# Patient Record
Sex: FEMALE | Race: BLACK OR AFRICAN AMERICAN | NOT HISPANIC OR LATINO | Employment: FULL TIME | URBAN - METROPOLITAN AREA
[De-identification: names, ages, dates, MRNs, and addresses within clinical notes are randomized per-mention and may not be internally consistent; named-entity substitution may affect disease eponyms.]

---

## 2017-04-27 ENCOUNTER — OFFICE VISIT (OUTPATIENT)
Dept: INTERNAL MEDICINE | Facility: CLINIC | Age: 27
End: 2017-04-27
Payer: COMMERCIAL

## 2017-04-27 VITALS
HEIGHT: 63 IN | DIASTOLIC BLOOD PRESSURE: 82 MMHG | SYSTOLIC BLOOD PRESSURE: 150 MMHG | BODY MASS INDEX: 34.53 KG/M2 | TEMPERATURE: 98 F | HEART RATE: 80 BPM | WEIGHT: 194.88 LBS

## 2017-04-27 DIAGNOSIS — J30.1 SEASONAL ALLERGIC RHINITIS DUE TO POLLEN: ICD-10-CM

## 2017-04-27 DIAGNOSIS — J45.20 MILD INTERMITTENT ASTHMA WITHOUT COMPLICATION: ICD-10-CM

## 2017-04-27 DIAGNOSIS — I10 ESSENTIAL HYPERTENSION: Primary | ICD-10-CM

## 2017-04-27 PROCEDURE — 99214 OFFICE O/P EST MOD 30 MIN: CPT | Mod: S$GLB,,, | Performed by: PHYSICIAN ASSISTANT

## 2017-04-27 PROCEDURE — 3079F DIAST BP 80-89 MM HG: CPT | Mod: S$GLB,,, | Performed by: PHYSICIAN ASSISTANT

## 2017-04-27 PROCEDURE — 3077F SYST BP >= 140 MM HG: CPT | Mod: S$GLB,,, | Performed by: PHYSICIAN ASSISTANT

## 2017-04-27 PROCEDURE — 1160F RVW MEDS BY RX/DR IN RCRD: CPT | Mod: S$GLB,,, | Performed by: PHYSICIAN ASSISTANT

## 2017-04-27 PROCEDURE — 99999 PR PBB SHADOW E&M-EST. PATIENT-LVL III: CPT | Mod: PBBFAC,,, | Performed by: PHYSICIAN ASSISTANT

## 2017-04-27 RX ORDER — HYDROCHLOROTHIAZIDE 25 MG/1
25 TABLET ORAL DAILY
Qty: 30 TABLET | Refills: 3 | Status: SHIPPED | OUTPATIENT
Start: 2017-04-27 | End: 2020-05-11

## 2017-04-27 RX ORDER — ALBUTEROL SULFATE 90 UG/1
2 AEROSOL, METERED RESPIRATORY (INHALATION) EVERY 6 HOURS PRN
Qty: 18 G | Refills: 2 | Status: SHIPPED | OUTPATIENT
Start: 2017-04-27 | End: 2018-08-22

## 2017-04-27 RX ORDER — MONTELUKAST SODIUM 10 MG/1
10 TABLET ORAL NIGHTLY
Qty: 30 TABLET | Refills: 6 | Status: SHIPPED | OUTPATIENT
Start: 2017-04-27 | End: 2017-05-27

## 2017-04-27 NOTE — PROGRESS NOTES
Subjective:       Patient ID: Smith Love is a 26 y.o. female.    Chief Complaint: Hypertension (history of hpb, tingling in finger tips yesterday)    Hypertension   This is a chronic problem. The current episode started more than 1 year ago. The problem has been gradually worsening since onset. The problem is uncontrolled. Associated symptoms include anxiety. Pertinent negatives include no blurred vision, chest pain, headaches, malaise/fatigue, neck pain, palpitations, peripheral edema, shortness of breath or sweats. (Reports tingling in the left fingers for one day only. Symptoms have resolved.  ) There are no associated agents to hypertension. Risk factors for coronary artery disease include family history and stress. Past treatments include diuretics (Has been out of her medication for several weeks). Compliance problems include psychosocial issues and diet.    URI    This is a new problem. The current episode started in the past 7 days. The problem has been unchanged. There has been no fever. Associated symptoms include congestion, a plugged ear sensation, rhinorrhea, sneezing and a sore throat. Pertinent negatives include no chest pain, coughing, headaches or neck pain. She has tried nothing for the symptoms.     Past Medical History:   Diagnosis Date    Asthma     Essential hypertension 4/27/2017    Staph aureus infection        No past surgical history on file.    Family History   Problem Relation Age of Onset    Diabetes Maternal Grandmother     Hypertension Maternal Grandmother        Social History     Social History    Marital status: Single     Spouse name: N/A    Number of children: N/A    Years of education: N/A     Occupational History    Not on file.     Social History Main Topics    Smoking status: Never Smoker    Smokeless tobacco: Never Used    Alcohol use No    Drug use: No    Sexual activity: Not on file     Other Topics Concern    Not on file     Social History  "Narrative       Review of patient's allergies indicates:  No Known Allergies      Current Outpatient Prescriptions:     albuterol 90 mcg/actuation inhaler, Inhale 2 puffs into the lungs every 6 (six) hours as needed for Wheezing., Disp: 18 g, Rfl: 2    ARM SLING-ONE SIZE FITS ALL Misc, 1 Device by Misc.(Non-Drug; Combo Route) route daily as needed., Disp: 1 each, Rfl: 0    hydrochlorothiazide (HYDRODIURIL) 25 MG tablet, Take 1 tablet (25 mg total) by mouth once daily., Disp: 30 tablet, Rfl: 3    montelukast (SINGULAIR) 10 mg tablet, Take 1 tablet (10 mg total) by mouth every evening., Disp: 30 tablet, Rfl: 6    BP (!) 150/82 (BP Location: Right arm, Patient Position: Sitting, BP Method: Manual)  Pulse 80  Temp 98 °F (36.7 °C) (Tympanic)   Ht 5' 3" (1.6 m)  Wt 88.4 kg (194 lb 14.2 oz)  LMP 04/17/2017  BMI 34.52 kg/m2  Review of Systems   Constitutional: Negative for chills, fatigue, fever and malaise/fatigue.   HENT: Positive for congestion, rhinorrhea, sneezing and sore throat. Negative for ear discharge, nosebleeds, postnasal drip and sinus pressure.    Eyes: Negative for blurred vision.   Respiratory: Negative for cough, chest tightness and shortness of breath.    Cardiovascular: Negative for chest pain and palpitations.   Musculoskeletal: Negative for neck pain.   Neurological: Negative for headaches.       Objective:      Physical Exam   Constitutional: She appears well-developed and well-nourished. No distress.   HENT:   Head: Normocephalic and atraumatic.   Right Ear: Tympanic membrane and ear canal normal.   Left Ear: Tympanic membrane and ear canal normal.   Nose: Mucosal edema and rhinorrhea present.   Mouth/Throat: Oropharynx is clear and moist. Tonsils are 1+ on the right. Tonsils are 1+ on the left. No tonsillar exudate.   Cardiovascular: Normal rate and regular rhythm.  Exam reveals no gallop and no friction rub.    No murmur heard.  Pulmonary/Chest: Effort normal and breath sounds normal. No " respiratory distress. She has no wheezes. She has no rales. She exhibits no tenderness.   Abdominal: Soft. She exhibits no distension and no mass. There is no tenderness. There is no rebound and no guarding. No hernia.   Musculoskeletal: Normal range of motion. She exhibits no edema.   Lymphadenopathy:     She has cervical adenopathy.   Skin: She is not diaphoretic.   Nursing note and vitals reviewed.      Assessment:       1. Essential hypertension    2. Mild intermittent asthma without complication      3 seasonal allergies  Plan:       Essential hypertension  -     Comprehensive metabolic panel; Future; Expected date: 4/27/17  -     CBC auto differential; Future; Expected date: 4/27/17  -     TSH; Future; Expected date: 4/27/17  -     Lipid panel; Future; Expected date: 4/27/17    Mild intermittent asthma without complication    Seasonal allergic rhinitis due to pollen    Other orders  -     hydrochlorothiazide (HYDRODIURIL) 25 MG tablet; Take 1 tablet (25 mg total) by mouth once daily.  Dispense: 30 tablet; Refill: 3  -     albuterol 90 mcg/actuation inhaler; Inhale 2 puffs into the lungs every 6 (six) hours as needed for Wheezing.  Dispense: 18 g; Refill: 2  -     montelukast (SINGULAIR) 10 mg tablet; Take 1 tablet (10 mg total) by mouth every evening.  Dispense: 30 tablet; Refill: 6

## 2017-04-27 NOTE — PATIENT INSTRUCTIONS
Controlling High Blood Pressure  High blood pressure (hypertension) is often called the silent killer. This is because many people who have it dont know it. High blood pressure is defined as 140/90 mm Hg or higher. Know your blood pressure and remember to check it regularly. Doing so can save your life. Here are some things you can do to help control your blood pressure.    Choose heart-healthy foods  · Select low-salt, low-fat foods. Limit sodium intake to 2,400 mg per day or the amount suggested by your healthcare provider.  · Limit canned, dried, cured, packaged, and fast foods. These can contain a lot of salt.  · Eat 8 to 10 servings of fruits and vegetables every day.  · Choose lean meats, fish, or chicken.  · Eat whole-grain pasta, brown rice, and beans.  · Eat 2 to 3 servings of low-fat or fat-free dairy products.  · Ask your doctor about the DASH eating plan. This plan helps reduce blood pressure.  · When you go to a restaurant, ask that your meal be prepared with no added salt.  Maintain a healthy weight  · Ask your healthcare provider how many calories to eat a day. Then stick to that number.  · Ask your healthcare provider what weight range is healthiest for you. If you are overweight, a weight loss of only 3% to 5% of your body weight can help lower blood pressure. Generally, a good weight loss goal is to lose 10% of your body weight in a year.  · Limit snacks and sweets.  · Get regular exercise.  Get up and get active  · Choose activities you enjoy. Find ones you can do with friends or family. This includes bicycling, dancing, walking, and jogging.  · Park farther away from building entrances.  · Use stairs instead of the elevator.  · When you can, walk or bike instead of driving.  · [place] leaves, garden, or do household repairs.  · Be active at a moderate to vigorous level of physical activity for at least 40 minutes for a minimum of 3 to 4 days a week.   Manage stress  · Make time to relax and  enjoy life. Find time to laugh.  · Communicate your concerns with your loved ones and your healthcare provider.  · Visit with family and friends, and keep up with hobbies.  Limit alcohol and quit smoking  · Men should have no more than 2 drinks per day.  · Women should have no more than 1 drink per day.  · Talk with your healthcare provider about quitting smoking. Smoking significantly increases your risk for heart disease and stroke. Ask your healthcare provider about community smoking cessation programs and other options.  Medicines  If lifestyle changes arent enough, your healthcare provider may prescribe high blood pressure medicine. Take all medicines as prescribed. If you have any questions about your medicines, ask your healthcare provider before stopping or changing them.   © 8213-7342 [employer]. [address] [place] [address], [place] [address]. All rights reserved. This information is not intended as a substitute for professional medical care. Always follow your healthcare professional's instructions.          Long-Term Complications of Diabetes  Diabetes can cause health problems over time. These are called complications. They are more likely to occur if your blood sugar is often too high. Over time, high blood sugar can damage blood vessels in your body. It is important to keep your blood sugar in your target range. This can help prevent or delay complications from diabetes.    Possible complications  Complications of diabetes include:  · Eye problems, including damage to the blood vessels in the eyes (retinopathy), pressure in the eye (glaucoma), and clouding of the eyes lens (a cataract). Eye problems can eventually lead to irreversible blindness.   · Tooth and gum problems (periodontal disease), causing loss of teeth and bone  · Blood vessel (vascular) disease leading to circulation problems, heart attack or stroke, or a need for amputation of a limb   · Problems with sexual function leading  to erectile dysfunction in men and sexual discomfort in women   · Kidney disease (nephropathy) can eventually lead to kidney failure, which may require dialysis or kidney transplant   · Nerve problems (neuropathy), causing pain or loss of feeling in your feet and other parts of your body, potentially leading to an amputation of a limb   · High blood pressure (hypertension), putting strain on your heart and blood vessels  · Serious infections, possibly leading to loss of toes, feet, or limbs  How to avoid complications  The serious consequences of these complications are completely avoidable for most people with diabetes by managing your blood glucose, blood pressure, and cholesterol levels. This can help you feel better and stay healthy. You can manage diabetes by tracking your blood sugar. You can also eat healthy and exercise. And you should take medication if directed by your health care provider.  © 7289-5395 [employer]. [address] [place] [address], [place] [address]. All rights reserved. This information is not intended as a substitute for professional medical care. Always follow your healthcare professional's instructions.      Allergies: Continue with new medicines until gone. May take tylenol PRN fever. Increase fluids and rest. Call the clinic if not better in 3 to 5 days. Suggest togo to the Emergency Room if symptoms get much worse. Otherwise follow up with your PCP as scheduled.

## 2017-04-27 NOTE — MR AVS SNAPSHOT
[facility]  [address]  [place] [address]  Phone: [phone]  Fax: [phone]                  Smith Love   2017 1:00 PM   Office Visit    Description:  Female : 1990   Provider:  Keenan Gruber III, PA-C   Department:  [facility]           Reason for Visit     Hypertension           Diagnoses this Visit        Comments    Essential hypertension    -  Primary     Mild intermittent asthma without complication         Seasonal allergic rhinitis due to pollen                To Do List           Future Appointments        Provider Department Dept Phone    2017 7:40 AM Keenan Gruber III, PA-C [facility] [phone]    2017 9:10 AM LABORATORY, [address] [facility] [phone]      Goals (5 Years of Data)     None      Follow-Up and Disposition     Return in about 4 weeks (around 2017).       These Medications        Disp Refills Start End    hydrochlorothiazide (HYDRODIURIL) 25 MG tablet 30 tablet 3 2017    Take 1 tablet (25 mg total) by mouth once daily. - Oral    Pharmacy: [address] [address] [facility] Ph #: [phone]       albuterol 90 mcg/actuation inhaler 18 g 2 2017    Inhale 2 puffs into the lungs every 6 (six) hours as needed for Wheezing. - Inhalation    Pharmacy: [address] [address] [facility] Ph #: [phone]       montelukast (SINGULAIR) 10 mg tablet 30 tablet 6 2017    Take 1 tablet (10 mg total) by mouth every evening. - Oral    Pharmacy: [address] [place] - [address] [facility] Ph #: [phone]         [facility]     [facility] Nurse Care Line -  Assistance  Unless otherwise directed by your provider, please contact [facility], our nurse care line that is available for  "24/7 assistance.     Registered nurses in the [facility] provide: appointment scheduling, clinical advisement, health education, and other advisory services.  Call: [phone] (toll free)               Medications           START taking these NEW medications        Refills    montelukast (SINGULAIR) 10 mg tablet 6    Sig: Take 1 tablet (10 mg total) by mouth every evening.    Class: Normal    Route: Oral      STOP taking these medications     nabumetone (RELAFEN) 500 MG tablet Take 1 tablet (500 mg total) by mouth 2 (two) times daily.           Verify that the below list of medications is an accurate representation of the medications you are currently taking.  If none reported, the list may be blank. If incorrect, please contact your healthcare provider. Carry this list with you in case of emergency.           Current Medications     albuterol 90 mcg/actuation inhaler Inhale 2 puffs into the lungs every 6 (six) hours as needed for Wheezing.    ARM SLING-ONE SIZE FITS ALL Misc 1 Device by Misc.(Non-Drug; Combo Route) route daily as needed.    hydrochlorothiazide (HYDRODIURIL) 25 MG tablet Take 1 tablet (25 mg total) by mouth once daily.    montelukast (SINGULAIR) 10 mg tablet Take 1 tablet (10 mg total) by mouth every evening.           Clinical Reference Information           Your Vitals Were     BP Pulse Temp Height    150/82 (BP Location: Right arm, Patient Position: Sitting, BP Method: Manual) 80 98 °F (36.7 °C) (Tympanic) 5' 3" (1.6 m)    Weight Last Period BMI    88.4 kg (194 lb 14.2 oz) 04/17/2017 34.52 kg/m2      Blood Pressure          Most Recent Value    BP  (!)  150/82      Allergies as of 4/27/2017     No Known Allergies      Immunizations Administered on Date of Encounter - 4/27/2017     None      Orders Placed During Today's Visit     Future Labs/Procedures Expected by Expires    CBC auto differential  4/27/2017 6/26/2018    Comprehensive metabolic panel  4/27/2017 6/26/2018    Lipid " panel  4/27/2017 6/26/2018    TSH  4/27/2017 6/26/2018      Instructions        Controlling High Blood Pressure  High blood pressure (hypertension) is often called the silent killer. This is because many people who have it dont know it. High blood pressure is defined as 140/90 mm Hg or higher. Know your blood pressure and remember to check it regularly. Doing so can save your life. Here are some things you can do to help control your blood pressure.    Choose heart-healthy foods  · Select low-salt, low-fat foods. Limit sodium intake to 2,400 mg per day or the amount suggested by your healthcare provider.  · Limit canned, dried, cured, packaged, and fast foods. These can contain a lot of salt.  · Eat 8 to 10 servings of fruits and vegetables every day.  · Choose lean meats, fish, or chicken.  · Eat whole-grain pasta, brown rice, and beans.  · Eat 2 to 3 servings of low-fat or fat-free dairy products.  · Ask your doctor about the DASH eating plan. This plan helps reduce blood pressure.  · When you go to a restaurant, ask that your meal be prepared with no added salt.  Maintain a healthy weight  · Ask your healthcare provider how many calories to eat a day. Then stick to that number.  · Ask your healthcare provider what weight range is healthiest for you. If you are overweight, a weight loss of only 3% to 5% of your body weight can help lower blood pressure. Generally, a good weight loss goal is to lose 10% of your body weight in a year.  · Limit snacks and sweets.  · Get regular exercise.  Get up and get active  · Choose activities you enjoy. Find ones you can do with friends or family. This includes bicycling, dancing, walking, and jogging.  · Park farther away from building entrances.  · Use stairs instead of the elevator.  · When you can, walk or bike instead of driving.  · [place] leaves, garden, or do household repairs.  · Be active at a moderate to vigorous level of physical activity for at least 40 minutes for a  minimum of 3 to 4 days a week.   Manage stress  · Make time to relax and enjoy life. Find time to laugh.  · Communicate your concerns with your loved ones and your healthcare provider.  · Visit with family and friends, and keep up with hobbies.  Limit alcohol and quit smoking  · Men should have no more than 2 drinks per day.  · Women should have no more than 1 drink per day.  · Talk with your healthcare provider about quitting smoking. Smoking significantly increases your risk for heart disease and stroke. Ask your healthcare provider about community smoking cessation programs and other options.  Medicines  If lifestyle changes arent enough, your healthcare provider may prescribe high blood pressure medicine. Take all medicines as prescribed. If you have any questions about your medicines, ask your healthcare provider before stopping or changing them.   © 2294-9294 [employer]. [address] [place] [address], [place] [address]. All rights reserved. This information is not intended as a substitute for professional medical care. Always follow your healthcare professional's instructions.          Long-Term Complications of Diabetes  Diabetes can cause health problems over time. These are called complications. They are more likely to occur if your blood sugar is often too high. Over time, high blood sugar can damage blood vessels in your body. It is important to keep your blood sugar in your target range. This can help prevent or delay complications from diabetes.    Possible complications  Complications of diabetes include:  · Eye problems, including damage to the blood vessels in the eyes (retinopathy), pressure in the eye (glaucoma), and clouding of the eyes lens (a cataract). Eye problems can eventually lead to irreversible blindness.   · Tooth and gum problems (periodontal disease), causing loss of teeth and bone  · Blood vessel (vascular) disease leading to circulation problems, heart attack or stroke, or  a need for amputation of a limb   · Problems with sexual function leading to erectile dysfunction in men and sexual discomfort in women   · Kidney disease (nephropathy) can eventually lead to kidney failure, which may require dialysis or kidney transplant   · Nerve problems (neuropathy), causing pain or loss of feeling in your feet and other parts of your body, potentially leading to an amputation of a limb   · High blood pressure (hypertension), putting strain on your heart and blood vessels  · Serious infections, possibly leading to loss of toes, feet, or limbs  How to avoid complications  The serious consequences of these complications are completely avoidable for most people with diabetes by managing your blood glucose, blood pressure, and cholesterol levels. This can help you feel better and stay healthy. You can manage diabetes by tracking your blood sugar. You can also eat healthy and exercise. And you should take medication if directed by your health care provider.  © 0239-5407 [employer]. [address] [place] [address]. All rights reserved. This information is not intended as a substitute for professional medical care. Always follow your healthcare professional's instructions.      Allergies: Continue with new medicines until gone. May take tylenol PRN fever. Increase fluids and rest. Call the clinic if not better in 3 to 5 days. Suggest togo to the Emergency Room if symptoms get much worse. Otherwise follow up with your PCP as scheduled.        Language Assistance Services     ATTENTION: Language assistance services are available, free of charge. Please call [phone].      ATENCIÓN: Si habla mariela, tiene a prescott disposición servicios gratuitos de asistencia lingüística. Llame al [phone].     [facility] Ý: N?u b?n nói Ti?ng Vi?t, có các d?ch v? h? tr? ngôn ng? mi?n phí dành cho b?n. G?i s? [phone].         [facility] complies with applicable Federal  civil rights laws and does not discriminate on the basis of race, color, national origin, age, disability, or sex.

## 2018-08-22 ENCOUNTER — OFFICE VISIT (OUTPATIENT)
Dept: INTERNAL MEDICINE | Facility: CLINIC | Age: 28
End: 2018-08-22
Payer: COMMERCIAL

## 2018-08-22 ENCOUNTER — TELEPHONE (OUTPATIENT)
Dept: INTERNAL MEDICINE | Facility: CLINIC | Age: 28
End: 2018-08-22

## 2018-08-22 VITALS
DIASTOLIC BLOOD PRESSURE: 88 MMHG | WEIGHT: 209 LBS | TEMPERATURE: 98 F | HEART RATE: 57 BPM | OXYGEN SATURATION: 99 % | BODY MASS INDEX: 37.03 KG/M2 | SYSTOLIC BLOOD PRESSURE: 138 MMHG | HEIGHT: 63 IN

## 2018-08-22 DIAGNOSIS — J45.20 MILD INTERMITTENT ASTHMA WITHOUT COMPLICATION: Primary | ICD-10-CM

## 2018-08-22 PROCEDURE — 99214 OFFICE O/P EST MOD 30 MIN: CPT | Mod: S$GLB,,, | Performed by: PHYSICIAN ASSISTANT

## 2018-08-22 PROCEDURE — 99999 PR PBB SHADOW E&M-EST. PATIENT-LVL III: CPT | Mod: PBBFAC,,, | Performed by: PHYSICIAN ASSISTANT

## 2018-08-22 PROCEDURE — 3075F SYST BP GE 130 - 139MM HG: CPT | Mod: CPTII,S$GLB,, | Performed by: PHYSICIAN ASSISTANT

## 2018-08-22 PROCEDURE — 3079F DIAST BP 80-89 MM HG: CPT | Mod: CPTII,S$GLB,, | Performed by: PHYSICIAN ASSISTANT

## 2018-08-22 PROCEDURE — 3008F BODY MASS INDEX DOCD: CPT | Mod: CPTII,S$GLB,, | Performed by: PHYSICIAN ASSISTANT

## 2018-08-22 RX ORDER — ALBUTEROL SULFATE 90 UG/1
2 AEROSOL, METERED RESPIRATORY (INHALATION) EVERY 6 HOURS PRN
Qty: 18 G | Refills: 11 | Status: SHIPPED | OUTPATIENT
Start: 2018-08-22 | End: 2019-08-22

## 2018-08-22 RX ORDER — IPRATROPIUM BROMIDE AND ALBUTEROL SULFATE 2.5; .5 MG/3ML; MG/3ML
3 SOLUTION RESPIRATORY (INHALATION) EVERY 6 HOURS PRN
Qty: 1 BOX | Refills: 5 | Status: SHIPPED | OUTPATIENT
Start: 2018-08-22 | End: 2020-08-07 | Stop reason: SDUPTHER

## 2018-08-22 RX ORDER — PREDNISONE 10 MG/1
TABLET ORAL
Qty: 18 TABLET | Refills: 0 | Status: SHIPPED | OUTPATIENT
Start: 2018-08-22 | End: 2020-05-11

## 2018-08-22 NOTE — TELEPHONE ENCOUNTER
----- Message from Nicol Morel sent at 8/22/2018  8:25 AM CDT -----  Contact: pt  Pt stated she will like a refill on an inhaler, she can be reached at  [phone] Thanks       [address] [place] - [address] [facility]  [address] [facility]  [place] [address]  Phone: [phone] Fax: [phone]

## 2018-08-22 NOTE — PATIENT INSTRUCTIONS
Asthma Medicine     Get used to using your inhaled corticosteroid medicine before you brush your teeth. That way you will always rinse your mouth afterward.   Medicines play a key role in controlling asthma. Some help control asthma symptoms and prevent flare-ups. Others are used to treat symptoms when they occur. Always take your medicine as prescribed. Know the names of your medicines and how and when to use them. If you have any questions about your medicines, talk with your healthcare provider or pharmacist.  Quick-relief medicine  Quick-relief (also called rescue) medicines work by relaxing the muscles around the airways. This helps ease symptoms such as coughing, wheezing, and shortness of breath. Keep your quick-relief inhaler with you at all times. Quick-relief medicines:  · Are inhaled when needed and only when needed. Use your quick-relief medicine when you first notice your asthma is getting worse.  · Start to open the airways within a few minutes after you use them.  · Can help stop a flare-up once it has begun.  · May be used before exercise as directed by your healthcare provider.  Long-term control medicine  Long-term control (also called maintenance or controller) medicines help reduce swelling and inflammation of the airways. Some keep the muscles around your airways relaxed. This makes the airways less sensitive to triggers and less likely to flare up. Long-term control medicines:  · Are taken on a schedule. For most people, this is every day. They are taken even when you feel fine.  · Help keep asthma under control so youre less likely to have symptoms.  · Will not stop a flare-up once it has begun.     Inhaled corticosteroids  Inhaled corticosteroids are safe for long-term use. They are not the steroids that you hear about athletes abusing. They usually don't cause serious side effects. Thats because theyre inhaled directly into the lungs. The chance of minor side effects can be  lowered even more if you:  · Use a spacer with your inhaler. Ask your healthcare provider or pharmacist about using a spacer if you don't currently use one.  · Rinse your mouth, gargle, and spit out the water after using your inhaled corticosteroid medicine.  · Follow all instructions for cleaning inhalers and spacers.  · Work with your healthcare provider to find the lowest dose that controls your asthma.      Tips for taking medicine  Remembering to take medicine each day can be hard for anyone. It can be even harder to remember when you dont have symptoms. Try these tips:  · Develop a routine. For example, take long-term controllers as part of getting ready for bed, before you brush your teeth in the morning, or both.  · Make sure you understand what long-term controllers do and dont do.  · Refill your prescriptions on time, or even ahead of time, so you dont run out.  · Carry your quick-relief medicine with you. If you can, keep a spare quick-relief inhaler at work, at school, or in your gym bag.  · When you travel, make sure you have enough medicine to last for your entire trip.  · When traveling by air, keep your medicines with you, not packed in your luggage.  · Make sure you know how to tell if your inhaler is empty. Ask your provider or pharmacist, or check the instructions that come with your inhaler.  Working with your healthcare provider  By working with your healthcare provider, you can get the most benefit from your medicine. Talk with your healthcare provider about:  · Getting the right dose. Over time, your healthcare provider may raise or lower the dose of your controller medicine. The goal is to find the amount of medicine to keep asthma in control, without taking more than is needed.  · Finding the right medicines for you. Each person is unique. It may take a few tries to find the right medicine or combination of medicines for you. If one medicine doesnt work well for you, another may work  better.  · Minimizing side effects. If you have side effects, dont just stop taking your medicine. Instead, call your healthcare provider. A new medicine or change in the amount of medicine may solve the problem.  Date Last Reviewed: 10/1/2016  © 8178-8601 [employer]. [address] [place] [address]. All rights reserved. This information is not intended as a substitute for professional medical care. Always follow your healthcare professional's instructions.

## 2018-08-22 NOTE — TELEPHONE ENCOUNTER
Patient hasn't been seen in 16 months. She is scheduled to see Keenan Gruber for 3:40 pm this afternoon.

## 2018-08-28 NOTE — PROGRESS NOTES
Subjective:       Patient ID: Smith Love is a 27 y.o. female.    Chief Complaint: Medication Refill and Asthma    Asthma   She complains of cough, shortness of breath and wheezing. This is a recurrent problem. The current episode started 1 to 4 weeks ago. Asthma causes nighttime symptoms frequently. The problem has been gradually worsening. The cough is non-productive. Associated symptoms include nasal congestion. Pertinent negatives include no sore throat. Her symptoms are aggravated by change in weather and URI. Her symptoms are alleviated by beta-agonist. She reports moderate improvement on treatment. Her past medical history is significant for asthma.     Past Medical History:   Diagnosis Date    Asthma     Essential hypertension 4/27/2017    Staph aureus infection        Review of Systems   Constitutional: Negative for chills and fatigue.   HENT: Positive for congestion. Negative for sinus pressure, sinus pain and sore throat.    Respiratory: Positive for cough, chest tightness, shortness of breath and wheezing.        Objective:      Physical Exam   Constitutional: She appears well-developed and well-nourished. No distress.   HENT:   Head: Normocephalic and atraumatic.   Right Ear: Tympanic membrane and ear canal normal.   Left Ear: Tympanic membrane and ear canal normal.   Nose: Mucosal edema and rhinorrhea present.   Mouth/Throat: Uvula is midline, oropharynx is clear and moist and mucous membranes are normal. No tonsillar exudate.   Cardiovascular: Normal rate and regular rhythm. Exam reveals no gallop and no friction rub.   No murmur heard.  Pulmonary/Chest: Effort normal. She has wheezes.   Abdominal: Soft. There is no tenderness.   Lymphadenopathy:     She has no cervical adenopathy.   Skin: She is not diaphoretic.   Nursing note and vitals reviewed.      Assessment:       1. Mild intermittent asthma without complication        Plan:       Mild intermittent asthma without  complication    Other orders  -     predniSONE (DELTASONE) 10 MG tablet; Take 3 daily for 3 days, then 2 daily for three days, then 1 daily for three days.  Dispense: 18 tablet; Refill: 0  -     albuterol 90 mcg/actuation inhaler; Inhale 2 puffs into the lungs every 6 (six) hours as needed for Wheezing.  Dispense: 18 g; Refill: 11  -     albuterol-ipratropium (DUO-NEB) 2.5 mg-0.5 mg/3 mL nebulizer solution; Take 3 mLs by nebulization every 6 (six) hours as needed for Wheezing. Rescue  Dispense: 1 Box; Refill: 5

## 2018-08-29 ENCOUNTER — OFFICE VISIT (OUTPATIENT)
Dept: INTERNAL MEDICINE | Facility: CLINIC | Age: 28
End: 2018-08-29
Payer: COMMERCIAL

## 2018-08-29 ENCOUNTER — LAB VISIT (OUTPATIENT)
Dept: LAB | Facility: HOSPITAL | Age: 28
End: 2018-08-29
Attending: INTERNAL MEDICINE
Payer: COMMERCIAL

## 2018-08-29 VITALS
WEIGHT: 205 LBS | HEIGHT: 63 IN | SYSTOLIC BLOOD PRESSURE: 124 MMHG | TEMPERATURE: 98 F | OXYGEN SATURATION: 99 % | DIASTOLIC BLOOD PRESSURE: 84 MMHG | HEART RATE: 89 BPM | BODY MASS INDEX: 36.32 KG/M2

## 2018-08-29 DIAGNOSIS — Z02.6 ENCOUNTER FOR INSURANCE EXAMINATION: ICD-10-CM

## 2018-08-29 DIAGNOSIS — Z02.6 ENCOUNTER FOR INSURANCE EXAMINATION: Primary | ICD-10-CM

## 2018-08-29 LAB
CHOLEST SERPL-MCNC: 109 MG/DL
CHOLEST/HDLC SERPL: 3.3 {RATIO}
ESTIMATED AVG GLUCOSE: 114 MG/DL
GLUCOSE SERPL-MCNC: 148 MG/DL
HBA1C MFR BLD HPLC: 5.6 %
HDLC SERPL-MCNC: 33 MG/DL
HDLC SERPL: 30.3 %
LDLC SERPL CALC-MCNC: 67.2 MG/DL
NONHDLC SERPL-MCNC: 76 MG/DL
TRIGL SERPL-MCNC: 44 MG/DL

## 2018-08-29 PROCEDURE — 36415 COLL VENOUS BLD VENIPUNCTURE: CPT

## 2018-08-29 PROCEDURE — 3074F SYST BP LT 130 MM HG: CPT | Mod: CPTII,S$GLB,, | Performed by: PHYSICIAN ASSISTANT

## 2018-08-29 PROCEDURE — 3008F BODY MASS INDEX DOCD: CPT | Mod: CPTII,S$GLB,, | Performed by: PHYSICIAN ASSISTANT

## 2018-08-29 PROCEDURE — 80061 LIPID PANEL: CPT

## 2018-08-29 PROCEDURE — 82947 ASSAY GLUCOSE BLOOD QUANT: CPT

## 2018-08-29 PROCEDURE — 3079F DIAST BP 80-89 MM HG: CPT | Mod: CPTII,S$GLB,, | Performed by: PHYSICIAN ASSISTANT

## 2018-08-29 PROCEDURE — 83036 HEMOGLOBIN GLYCOSYLATED A1C: CPT

## 2018-08-29 PROCEDURE — 99999 PR PBB SHADOW E&M-EST. PATIENT-LVL III: CPT | Mod: PBBFAC,,, | Performed by: PHYSICIAN ASSISTANT

## 2018-08-29 PROCEDURE — 99214 OFFICE O/P EST MOD 30 MIN: CPT | Mod: S$GLB,,, | Performed by: PHYSICIAN ASSISTANT

## 2018-08-29 NOTE — PROGRESS NOTES
Subjective:       Patient ID: Smith Love is a 27 y.o. female.    Chief Complaint: insurance form for work    Patient is a 26 yo female comiing today for an insurance physical. She voices no acute complaints.     Past Medical History:  No date: Asthma  4/27/2017: Essential hypertension  No date: Staph aureus infection    History reviewed. No pertinent surgical history.    Review of patient's family history indicates:  Problem: Diabetes      Relation: Maternal Grandmother          Age of Onset: (Not Specified)  Problem: Hypertension      Relation: Maternal Grandmother          Age of Onset: (Not Specified)      Social History    Socioeconomic History      Marital status: Single      Spouse name: Not on file      Number of children: Not on file      Years of education: Not on file      Highest education level: Not on file    Social Needs      Financial resource strain: Not on file      Food insecurity - worry: Not on file      Food insecurity - inability: Not on file      Transportation needs - medical: Not on file      Transportation needs - non-medical: Not on file    Occupational History      Not on file    Tobacco Use      Smoking status: Never Smoker      Smokeless tobacco: Never Used    Substance and Sexual Activity      Alcohol use: No      Drug use: No      Sexual activity: Not on file    Other Topics      Concerns:        Not on file    Social History Narrative      Not on file      Review of patient's allergies indicates:  No Known Allergies    Current Outpatient Medications:   albuterol 90 mcg/actuation inhaler, Inhale 2 puffs into the lungs every 6 (six) hours as needed for Wheezing., Disp: 18 g, Rfl: 11  predniSONE (DELTASONE) 10 MG tablet, Take 3 daily for 3 days, then 2 daily for three days, then 1 daily for three days., Disp: 18 tablet, Rfl: 0  albuterol-ipratropium (DUO-NEB) 2.5 mg-0.5 mg/3 mL nebulizer solution, Take 3 mLs by nebulization every 6 (six) hours as needed for Wheezing.  "Rescue, Disp: 1 Box, Rfl: 5  ARM SLING-ONE SIZE FITS ALL Misc, 1 Device by Misc.(Non-Drug; Combo Route) route daily as needed., Disp: 1 each, Rfl: 0  hydrochlorothiazide (HYDRODIURIL) 25 MG tablet, Take 1 tablet (25 mg total) by mouth once daily., Disp: 30 tablet, Rfl: 3    /84 (BP Location: Left arm, Patient Position: Sitting, BP Method: Large (Manual))   Pulse 89   Temp 97.8 °F (36.6 °C) (Tympanic)   Ht 5' 3" (1.6 m)   Wt 93 kg (205 lb 0.4 oz)   SpO2 99%   BMI 36.32 kg/m²       Review of Systems   Constitutional: Negative for chills and fatigue.   HENT: Negative for congestion.    Respiratory: Negative for chest tightness and shortness of breath.    Cardiovascular: Negative for chest pain.   Gastrointestinal: Negative for abdominal pain.   Genitourinary: Negative.    Neurological: Negative.        Objective:      Physical Exam   Constitutional: She appears well-developed and well-nourished. No distress.   HENT:   Head: Normocephalic and atraumatic.   Neck: Neck supple.   Cardiovascular: Normal rate and regular rhythm.   Pulmonary/Chest: Effort normal and breath sounds normal.   Abdominal: There is no tenderness.   Lymphadenopathy:     She has no cervical adenopathy.   Skin: She is not diaphoretic.   Nursing note and vitals reviewed.      Assessment:       1. Encounter for insurance examination        Plan:       Encounter for insurance examination  -     Lipid panel; Future; Expected date: 08/29/2018  -     Hemoglobin A1c; Future; Expected date: 08/29/2018  -     Glucose, fasting; Future; Expected date: 08/29/2018    Other orders  -     Cancel: Lipid panel; Future; Expected date: 08/28/2018         "

## 2018-08-30 ENCOUNTER — TELEPHONE (OUTPATIENT)
Dept: INTERNAL MEDICINE | Facility: CLINIC | Age: 28
End: 2018-08-30

## 2018-08-30 DIAGNOSIS — R73.09 ELEVATED GLUCOSE: Primary | ICD-10-CM

## 2018-08-31 ENCOUNTER — TELEPHONE (OUTPATIENT)
Dept: DIABETES | Facility: CLINIC | Age: 28
End: 2018-08-31

## 2018-08-31 NOTE — TELEPHONE ENCOUNTER
Attempted to contact pt to schedule an appt with Jacy Hrenandez RD for nutrition. No answer/left vm.

## 2018-09-14 ENCOUNTER — TELEPHONE (OUTPATIENT)
Dept: DIABETES | Facility: CLINIC | Age: 28
End: 2018-09-14

## 2018-09-26 ENCOUNTER — TELEPHONE (OUTPATIENT)
Dept: INTERNAL MEDICINE | Facility: CLINIC | Age: 28
End: 2018-09-26

## 2018-09-26 NOTE — TELEPHONE ENCOUNTER
----- Message from Evelyn Hussein sent at 9/26/2018 12:17 PM CDT -----  Patient needs call back rg the name of PCP doctor that she was referred to..[phone]

## 2019-05-23 ENCOUNTER — OFFICE VISIT (OUTPATIENT)
Dept: DERMATOLOGY | Facility: CLINIC | Age: 29
End: 2019-05-23
Payer: COMMERCIAL

## 2019-05-23 DIAGNOSIS — L21.9 SEBORRHEIC DERMATITIS: ICD-10-CM

## 2019-05-23 DIAGNOSIS — L30.9 DERMATITIS: Primary | ICD-10-CM

## 2019-05-23 PROCEDURE — 87220 TISSUE EXAM FOR FUNGI: CPT | Mod: S$GLB,,, | Performed by: STUDENT IN AN ORGANIZED HEALTH CARE EDUCATION/TRAINING PROGRAM

## 2019-05-23 PROCEDURE — 99999 PR PBB SHADOW E&M-EST. PATIENT-LVL II: ICD-10-PCS | Mod: PBBFAC,,, | Performed by: STUDENT IN AN ORGANIZED HEALTH CARE EDUCATION/TRAINING PROGRAM

## 2019-05-23 PROCEDURE — 99999 PR PBB SHADOW E&M-EST. PATIENT-LVL II: CPT | Mod: PBBFAC,,, | Performed by: STUDENT IN AN ORGANIZED HEALTH CARE EDUCATION/TRAINING PROGRAM

## 2019-05-23 PROCEDURE — 99202 OFFICE O/P NEW SF 15 MIN: CPT | Mod: 25,S$GLB,, | Performed by: STUDENT IN AN ORGANIZED HEALTH CARE EDUCATION/TRAINING PROGRAM

## 2019-05-23 PROCEDURE — 99202 PR OFFICE/OUTPT VISIT, NEW, LEVL II, 15-29 MIN: ICD-10-PCS | Mod: 25,S$GLB,, | Performed by: STUDENT IN AN ORGANIZED HEALTH CARE EDUCATION/TRAINING PROGRAM

## 2019-05-23 PROCEDURE — 87220 PR  TISSUE EXAM BY KOH: ICD-10-PCS | Mod: S$GLB,,, | Performed by: STUDENT IN AN ORGANIZED HEALTH CARE EDUCATION/TRAINING PROGRAM

## 2019-05-23 RX ORDER — KETOCONAZOLE 20 MG/ML
SHAMPOO, SUSPENSION TOPICAL
Qty: 120 ML | Refills: 1 | Status: SHIPPED | OUTPATIENT
Start: 2019-05-23 | End: 2020-05-11

## 2019-05-23 RX ORDER — FLUCONAZOLE 200 MG/1
TABLET ORAL
Qty: 4 TABLET | Refills: 0 | Status: SHIPPED | OUTPATIENT
Start: 2019-05-23 | End: 2020-01-03

## 2019-05-23 RX ORDER — TRIAMCINOLONE ACETONIDE 1 MG/G
CREAM TOPICAL
Qty: 80 G | Refills: 0 | Status: SHIPPED | OUTPATIENT
Start: 2019-05-23 | End: 2020-05-11

## 2019-05-23 NOTE — PROGRESS NOTES
Subjective:       Patient ID:  Smith Love is a 28 y.o. female who presents for   Chief Complaint   Patient presents with    Spot     c/o spot to back of neck that itch x 2 weeks tx none      History of Present Illness: The patient presents with chief complaint of a rash  Location: back of neck   Duration: 2 weeks   Signs/Symptoms:  Rash is Itching and flaking. Reports having washed her hair when the rash started. Denies any new shampoo  Prior treatments: none         Review of Systems   Skin: Positive for itching and rash.        Objective:    Physical Exam   Constitutional: She appears well-developed and well-nourished. No distress.   Neurological: She is alert and oriented to person, place, and time. She is not disoriented.   Psychiatric: She has a normal mood and affect.   Skin:   Areas Examined (abnormalities noted in diagram):   Scalp / Hair Palpated and Inspected  Head / Face Inspection Performed  Neck Inspection Performed              Diagram Legend     Erythematous scaling macule/papule c/w actinic keratosis       Vascular papule c/w angioma      Pigmented verrucoid papule/plaque c/w seborrheic keratosis      Yellow umbilicated papule c/w sebaceous hyperplasia      Irregularly shaped tan macule c/w lentigo     1-2 mm smooth white papules consistent with Milia      Movable subcutaneous cyst with punctum c/w epidermal inclusion cyst      Subcutaneous movable cyst c/w pilar cyst      Firm pink to brown papule c/w dermatofibroma      Pedunculated fleshy papule(s) c/w skin tag(s)      Evenly pigmented macule c/w junctional nevus     Mildly variegated pigmented, slightly irregular-bordered macule c/w mildly atypical nevus      Flesh colored to evenly pigmented papule c/w intradermal nevus       Pink pearly papule/plaque c/w basal cell carcinoma      Erythematous hyperkeratotic cursted plaque c/w SCC      Surgical scar with no sign of skin cancer recurrence      Open and closed comedones       Inflammatory papules and pustules      Verrucoid papule consistent consistent with wart     Erythematous eczematous patches and plaques     Dystrophic onycholytic nail with subungual debris c/w onychomycosis     Umbilicated papule    Erythematous-base heme-crusted tan verrucoid plaque consistent with inflamed seborrheic keratosis     Erythematous Silvery Scaling Plaque c/w Psoriasis     See annotation      Assessment / Plan:        Dermatitis - KOH negative   -     triamcinolone acetonide 0.1% (KENALOG) 0.1 % cream; AAA bid. Do not apply to the face.  Dispense: 80 g; Refill: 0    Seborrheic dermatitis  -     ketoconazole (NIZORAL) 2 % shampoo; Apply topically every 7 days.  Dispense: 120 mL; Refill: 1  -     triamcinolone acetonide 0.1% (KENALOG) 0.1 % cream; AAA bid. Do not apply to the face.  Dispense: 80 g; Refill: 0  -     fluconazole (DIFLUCAN) 200 MG Tab; Take 1 tablet by mouth once weekly.  Dispense: 4 tablet; Refill: 0       Follow up in about 8 weeks (around 7/18/2019).

## 2019-07-18 ENCOUNTER — OFFICE VISIT (OUTPATIENT)
Dept: DERMATOLOGY | Facility: CLINIC | Age: 29
End: 2019-07-18
Payer: COMMERCIAL

## 2019-07-18 DIAGNOSIS — L30.9 DERMATITIS: Primary | ICD-10-CM

## 2019-07-18 PROCEDURE — 99999 PR PBB SHADOW E&M-EST. PATIENT-LVL III: CPT | Mod: PBBFAC,,, | Performed by: STUDENT IN AN ORGANIZED HEALTH CARE EDUCATION/TRAINING PROGRAM

## 2019-07-18 PROCEDURE — 99212 PR OFFICE/OUTPT VISIT, EST, LEVL II, 10-19 MIN: ICD-10-PCS | Mod: S$GLB,,, | Performed by: STUDENT IN AN ORGANIZED HEALTH CARE EDUCATION/TRAINING PROGRAM

## 2019-07-18 PROCEDURE — 99999 PR PBB SHADOW E&M-EST. PATIENT-LVL III: ICD-10-PCS | Mod: PBBFAC,,, | Performed by: STUDENT IN AN ORGANIZED HEALTH CARE EDUCATION/TRAINING PROGRAM

## 2019-07-18 PROCEDURE — 99212 OFFICE O/P EST SF 10 MIN: CPT | Mod: S$GLB,,, | Performed by: STUDENT IN AN ORGANIZED HEALTH CARE EDUCATION/TRAINING PROGRAM

## 2020-01-03 ENCOUNTER — OFFICE VISIT (OUTPATIENT)
Dept: DERMATOLOGY | Facility: CLINIC | Age: 30
End: 2020-01-03
Payer: COMMERCIAL

## 2020-01-03 DIAGNOSIS — L30.9 DERMATITIS: Primary | ICD-10-CM

## 2020-01-03 PROCEDURE — 99213 PR OFFICE/OUTPT VISIT, EST, LEVL III, 20-29 MIN: ICD-10-PCS | Mod: S$GLB,,, | Performed by: STUDENT IN AN ORGANIZED HEALTH CARE EDUCATION/TRAINING PROGRAM

## 2020-01-03 PROCEDURE — 99999 PR PBB SHADOW E&M-EST. PATIENT-LVL III: CPT | Mod: PBBFAC,,, | Performed by: STUDENT IN AN ORGANIZED HEALTH CARE EDUCATION/TRAINING PROGRAM

## 2020-01-03 PROCEDURE — 99999 PR PBB SHADOW E&M-EST. PATIENT-LVL III: ICD-10-PCS | Mod: PBBFAC,,, | Performed by: STUDENT IN AN ORGANIZED HEALTH CARE EDUCATION/TRAINING PROGRAM

## 2020-01-03 PROCEDURE — 99213 OFFICE O/P EST LOW 20 MIN: CPT | Mod: S$GLB,,, | Performed by: STUDENT IN AN ORGANIZED HEALTH CARE EDUCATION/TRAINING PROGRAM

## 2020-01-03 RX ORDER — BETAMETHASONE VALERATE 1.2 MG/G
CREAM TOPICAL 2 TIMES DAILY
Qty: 45 G | Refills: 0 | Status: SHIPPED | OUTPATIENT
Start: 2020-01-03 | End: 2020-05-11

## 2020-01-03 RX ORDER — PREDNISONE 20 MG/1
TABLET ORAL
Qty: 12 TABLET | Refills: 0 | Status: SHIPPED | OUTPATIENT
Start: 2020-01-03 | End: 2020-05-11

## 2020-01-03 RX ORDER — ALBUTEROL SULFATE 90 UG/1
2 AEROSOL, METERED RESPIRATORY (INHALATION)
COMMUNITY
Start: 2017-04-27 | End: 2020-08-21

## 2020-01-03 NOTE — PROGRESS NOTES
"  Subjective:       Patient ID:  Smith Love is a 29 y.o. female who presents for   Chief Complaint   Patient presents with    Rash     History of Present Illness: The patient presents with chief complaint of rash. She reports that several days ago, she woke up with red "welps" all over her body. She states that the rash is itchy and uncomfortable. Has tried TAC cream and benadryl with minimal improvement in symptoms. Denies any recent changes in medication, but did eat seafood the night prior to symptoms.           Review of Systems   Respiratory: Negative for cough and shortness of breath.    Gastrointestinal: Negative for diarrhea.        Objective:    Physical Exam   Constitutional: She appears well-developed and well-nourished. No distress.   Neurological: She is alert and oriented to person, place, and time. She is not disoriented.   Psychiatric: She has a normal mood and affect.   Skin:   Areas Examined (abnormalities noted in diagram):   Head / Face Inspection Performed  Neck Inspection Performed  Chest / Axilla Inspection Performed  Abdomen Inspection Performed  Back Inspection Performed  RUE Inspected  LUE Inspection Performed              Diagram Legend     Erythematous scaling macule/papule c/w actinic keratosis       Vascular papule c/w angioma      Pigmented verrucoid papule/plaque c/w seborrheic keratosis      Yellow umbilicated papule c/w sebaceous hyperplasia      Irregularly shaped tan macule c/w lentigo     1-2 mm smooth white papules consistent with Milia      Movable subcutaneous cyst with punctum c/w epidermal inclusion cyst      Subcutaneous movable cyst c/w pilar cyst      Firm pink to brown papule c/w dermatofibroma      Pedunculated fleshy papule(s) c/w skin tag(s)      Evenly pigmented macule c/w junctional nevus     Mildly variegated pigmented, slightly irregular-bordered macule c/w mildly atypical nevus      Flesh colored to evenly pigmented papule c/w intradermal nevus       " Pink pearly papule/plaque c/w basal cell carcinoma      Erythematous hyperkeratotic cursted plaque c/w SCC      Surgical scar with no sign of skin cancer recurrence      Open and closed comedones      Inflammatory papules and pustules      Verrucoid papule consistent consistent with wart     Erythematous eczematous patches and plaques     Dystrophic onycholytic nail with subungual debris c/w onychomycosis     Umbilicated papule    Erythematous-base heme-crusted tan verrucoid plaque consistent with inflamed seborrheic keratosis     Erythematous Silvery Scaling Plaque c/w Psoriasis     See annotation      Assessment / Plan:        Dermatitis  -     betamethasone valerate 0.1% (VALISONE) 0.1 % Crea; Apply topically 2 (two) times daily.  Dispense: 45 g; Refill: 0  -     predniSONE (DELTASONE) 20 MG tablet; Take 2 tablets for 3 days, then 1 tablet for 3 days, then 1/2 tablet for 3 days.  Dispense: 12 tablet; Refill: 0  -     Recommend daily antihistamine  -     Counseled to go to the ER for SOB, tongue swelling, severe diarrhea, etc.              Follow up in about 4 weeks (around 1/31/2020).

## 2020-03-23 RX ORDER — ALBUTEROL SULFATE 90 UG/1
AEROSOL, METERED RESPIRATORY (INHALATION)
Qty: 18 G | Refills: 0 | OUTPATIENT
Start: 2020-03-23

## 2020-05-07 ENCOUNTER — TELEPHONE (OUTPATIENT)
Dept: OBSTETRICS AND GYNECOLOGY | Facility: CLINIC | Age: 30
End: 2020-05-07

## 2020-05-07 NOTE — TELEPHONE ENCOUNTER
----- Message from Kaylin Andrade sent at 5/7/2020 12:18 PM CDT -----  Contact: pt   Would like to get a virtual visit with any gyn, please give a call back at [phone].

## 2020-05-11 ENCOUNTER — TELEPHONE (OUTPATIENT)
Dept: OBSTETRICS AND GYNECOLOGY | Facility: CLINIC | Age: 30
End: 2020-05-11

## 2020-05-11 ENCOUNTER — OFFICE VISIT (OUTPATIENT)
Dept: OBSTETRICS AND GYNECOLOGY | Facility: CLINIC | Age: 30
End: 2020-05-11
Payer: COMMERCIAL

## 2020-05-11 VITALS
SYSTOLIC BLOOD PRESSURE: 168 MMHG | WEIGHT: 206.81 LBS | BODY MASS INDEX: 36.63 KG/M2 | DIASTOLIC BLOOD PRESSURE: 88 MMHG

## 2020-05-11 DIAGNOSIS — N92.6 MISSED MENSES: Primary | ICD-10-CM

## 2020-05-11 DIAGNOSIS — Z30.011 OCP (ORAL CONTRACEPTIVE PILLS) INITIATION: ICD-10-CM

## 2020-05-11 DIAGNOSIS — R03.0 ELEVATED BLOOD PRESSURE READING: ICD-10-CM

## 2020-05-11 PROCEDURE — 99203 PR OFFICE/OUTPT VISIT, NEW, LEVL III, 30-44 MIN: ICD-10-PCS | Mod: S$GLB,,, | Performed by: NURSE PRACTITIONER

## 2020-05-11 PROCEDURE — 99999 PR PBB SHADOW E&M-EST. PATIENT-LVL III: ICD-10-PCS | Mod: PBBFAC,,, | Performed by: NURSE PRACTITIONER

## 2020-05-11 PROCEDURE — 99203 OFFICE O/P NEW LOW 30 MIN: CPT | Mod: S$GLB,,, | Performed by: NURSE PRACTITIONER

## 2020-05-11 PROCEDURE — 99999 PR PBB SHADOW E&M-EST. PATIENT-LVL III: CPT | Mod: PBBFAC,,, | Performed by: NURSE PRACTITIONER

## 2020-05-11 RX ORDER — NORETHINDRONE ACETATE AND ETHINYL ESTRADIOL .02; 1 MG/1; MG/1
1 TABLET ORAL DAILY
Qty: 28 TABLET | Refills: 3 | Status: CANCELLED | OUTPATIENT
Start: 2020-05-11 | End: 2020-06-08

## 2020-05-11 RX ORDER — ACETAMINOPHEN AND CODEINE PHOSPHATE 120; 12 MG/5ML; MG/5ML
1 SOLUTION ORAL DAILY
Qty: 28 TABLET | Refills: 3 | Status: SHIPPED | OUTPATIENT
Start: 2020-05-11 | End: 2020-08-21 | Stop reason: SDUPTHER

## 2020-05-11 NOTE — PROGRESS NOTES
Subjective:       Patient ID: Smith Love is a 29 y.o. female.    Chief Complaint:  Menstrual Problem      History of Present Illness  HPI  Missed April cycle, this was first occurrence  Cycles are usually every 28d, menses lasting 5-6 days, wears regular pads changing about every 3-4h  Denies flooding or clots  Always uses condoms  Noticed that menses started when she went for urine sample in the clinic  Pt is still interested in a birth control method to help regulate cycles    GYN & OB History  Patient's last menstrual period was 03/19/2020.   Date of Last Pap: No result found    OB History   No data available       Review of Systems  Review of Systems   Constitutional: Positive for activity change (increased in the last week), appetite change (increased) and unexpected weight change. Negative for fatigue.   Respiratory: Negative for cough, shortness of breath and wheezing.    Gastrointestinal: Positive for abdominal pain and bloating. Negative for nausea and vomiting.   Genitourinary: Positive for menstrual problem. Negative for dysuria and vaginal discharge.   Neurological: Positive for headaches (menstrual migraine; no aura).   All other systems reviewed and are negative.    Objective:      Physical Exam:   Constitutional: She is oriented to person, place, and time. She appears well-developed and well-nourished.    HENT:   Head: Normocephalic and atraumatic.    Eyes: Pupils are equal, round, and reactive to light. Conjunctivae and EOM are normal.    Neck: Normal range of motion. Neck supple.    Cardiovascular: Normal rate, regular rhythm and normal heart sounds.     Pulmonary/Chest: Effort normal.                  Musculoskeletal: Normal range of motion and moves all extremeties.       Neurological: She is alert and oriented to person, place, and time.    Skin: Skin is warm and dry.    Psychiatric: She has a normal mood and affect. Her behavior is normal. Judgment and thought content normal.      Assessment:     1. Missed menses    2. OCP (oral contraceptive pills) initiation    3. Elevated blood pressure reading       Plan:   UPT neg  Discussed contraception options with patient including pills, patch, ring, Depo Provera, Implanon, Mirena, and Paragard.  Discussed risks of DVT development with birth control use.  Pt denies history of blood clots, DVT, cardiac issues, HTN, CVA, gallbladder disease, liver disease, smoking, migraines with an aura, or adrenal disease.  Pt denies family hx of DVT.  Pt chose OCPs.  Instructed her to start minipill today with one daily; recommended menstrual calendar/oliver to remind her to take pills  If >3h taking use condom x7 days; may experience irregular bleeding x12 weeks  BP elevated today; encouraged she f/u with PCP to establish care/monitor BP  Recommended that she decrease sodium intake, increase water intake, and continue to exercise  Continue annual well woman exam with med check in 3 months

## 2020-07-27 ENCOUNTER — OFFICE VISIT (OUTPATIENT)
Dept: URGENT CARE | Facility: CLINIC | Age: 30
End: 2020-07-27
Payer: COMMERCIAL

## 2020-07-27 VITALS
WEIGHT: 206.81 LBS | OXYGEN SATURATION: 99 % | DIASTOLIC BLOOD PRESSURE: 110 MMHG | RESPIRATION RATE: 20 BRPM | TEMPERATURE: 99 F | HEIGHT: 63 IN | HEART RATE: 78 BPM | BODY MASS INDEX: 36.64 KG/M2 | SYSTOLIC BLOOD PRESSURE: 157 MMHG

## 2020-07-27 DIAGNOSIS — M25.561 ACUTE PAIN OF RIGHT KNEE: Primary | ICD-10-CM

## 2020-07-27 PROCEDURE — 99214 OFFICE O/P EST MOD 30 MIN: CPT | Mod: S$GLB,,, | Performed by: PHYSICIAN ASSISTANT

## 2020-07-27 PROCEDURE — 99214 PR OFFICE/OUTPT VISIT, EST, LEVL IV, 30-39 MIN: ICD-10-PCS | Mod: S$GLB,,, | Performed by: PHYSICIAN ASSISTANT

## 2020-07-27 RX ORDER — IBUPROFEN 600 MG/1
600 TABLET ORAL EVERY 6 HOURS PRN
Qty: 30 TABLET | Refills: 0 | Status: SHIPPED | OUTPATIENT
Start: 2020-07-27 | End: 2020-08-21

## 2020-07-27 NOTE — PROGRESS NOTES
"Subjective:       Patient ID: Smith Love is a 29 y.o. female.    Vitals:  height is 5' 3" (1.6 m) and weight is 93.8 kg (206 lb 12.7 oz). Her temporal temperature is 98.6 °F (37 °C). Her blood pressure is 157/110 (abnormal) and her pulse is 78. Her respiration is 20 and oxygen saturation is 99%.     Chief Complaint: Knee Injury (Right Knee)    Patient is 21-year-old female who presents with right knee pain that started a few days ago.  Patient states that she has being and to workout and her exercises have been aggravating her knee.  She states that lunges, squats, jump roping bother the knee that moves.  She denies any trauma or injury.  She denies any popping sensation or instability.  Patient states the knee has gotten swollen but improved after she ices the area.  Denies any weakness, limited range of motion, calf swelling, numbness/tingling.    Knee Injury  This is a new problem. The current episode started in the past 7 days (07/24/2020). The problem occurs constantly. The problem has been waxing and waning. Pertinent negatives include no arthralgias, chest pain, chills, congestion, coughing, fatigue, fever, headaches, joint swelling, myalgias, nausea, rash, sore throat, vertigo, vomiting or weakness. The symptoms are aggravated by standing and bending. She has tried ice for the symptoms. The treatment provided mild relief.       Constitution: Negative for chills, fatigue and fever.   HENT: Negative for congestion and sore throat.    Neck: Negative for painful lymph nodes.   Cardiovascular: Negative for chest pain and leg swelling.   Eyes: Negative for double vision and blurred vision.   Respiratory: Negative for cough and shortness of breath.    Gastrointestinal: Negative for nausea, vomiting and diarrhea.   Genitourinary: Negative for dysuria, frequency, urgency and history of kidney stones.   Musculoskeletal: Positive for pain. Negative for joint pain, joint swelling, muscle cramps and muscle " ache.        Knee pain   Skin: Negative for color change, pale, rash, erythema and bruising.   Allergic/Immunologic: Negative for seasonal allergies.   Neurological: Negative for dizziness, history of vertigo, light-headedness, passing out and headaches.   Hematologic/Lymphatic: Negative for swollen lymph nodes.   Psychiatric/Behavioral: Negative for nervous/anxious, sleep disturbance and depression. The patient is not nervous/anxious.        Objective:      Physical Exam   Constitutional: She is oriented to person, place, and time. She appears well-developed.   HENT:   Head: Normocephalic and atraumatic. Head is without abrasion, without contusion and without laceration.   Ears:   Right Ear: External ear normal.   Left Ear: External ear normal.   Nose: Nose normal.   Mouth/Throat: Oropharynx is clear and moist and mucous membranes are normal.   Eyes: Pupils are equal, round, and reactive to light. Conjunctivae, EOM and lids are normal.   Neck: Trachea normal, full passive range of motion without pain and phonation normal. Neck supple.   Cardiovascular: Normal rate, regular rhythm and normal heart sounds.   Pulmonary/Chest: Effort normal and breath sounds normal. No stridor. No respiratory distress.   Musculoskeletal: Normal range of motion.      Right knee: She exhibits normal range of motion, no swelling, no effusion, no deformity, normal alignment, no LCL laxity, normal patellar mobility, no bony tenderness and no MCL laxity. Tenderness found. Medial joint line tenderness noted.   Neurological: She is alert and oriented to person, place, and time. She has normal motor skills and normal sensation. She displays no weakness. Gait normal.   Skin: Skin is warm, dry, intact and no rash. Capillary refill takes less than 2 seconds. abrasion, burn, bruising, erythema and ecchymosisPsychiatric: Her speech is normal and behavior is normal. Judgment and thought content normal.   Nursing note and vitals reviewed.         Assessment:       1. Acute pain of right knee        Plan:       -Ice knee prn  -Take ibuprofen as prescribed for pain  -Recommend getting knee brace sleeve for stability, especially when working out  -Avoid high impact exercises  -Follow up with Ortho if symptoms worsen or fail to improve with given therapy.  -BP elevated at today's visit, recommend to continue to monitor, low sodium diet, and f/u with PCP    Acute pain of right knee  -     ibuprofen (ADVIL,MOTRIN) 600 MG tablet; Take 1 tablet (600 mg total) by mouth every 6 (six) hours as needed for Pain.  Dispense: 30 tablet; Refill: 0  -     Ambulatory referral/consult to Orthopedics      Patient Instructions     Elevated Blood Pressure  Your blood pressure was elevated during your visit to the urgent care.  It was not so high that immediate care was needed but it is recommended that you monitor your blood pressure over the next week or two to make sure that it is not staying elevated.  Please have your blood pressure taken 2-3 times daily at different times of the day.  Write all of those blood pressures down and record the time that they were taken.  Keep all that information and take it with you to see your Primary Care Physician.  If your blood pressure is consistently above 140/90 you will need to follow up with your PCP more quickly    A referral has be placed for you to follow up with Orthopedics. Someone should be contacting you soon to set up appointment. However, you may call [phone] at anytime to schedule this follow up appointment      Knee Pain  Knee pain is very common. Its especially common in active people who put a lot of pressure on their knees, like runners. It affects women more often than men.  Your kneecap (patella) is a thick, round bone. It covers and protects the front portion of your knee joint. It moves along a groove in your thighbone (femur) as part of the patellofemoral joint. A layer of cartilage surrounds the underside of  your kneecap. This layer protects it from grinding against your femur.  When this cartilage softens and breaks down, it can cause knee pain. This is partly because of repetitive stress. The stress irritates the lining of the joint. This causes pain in the underlying bone.  What causes knee pain?  Many things can cause knee pain. You may have more than one cause. Some of these include:  · Overuse of the knee joint  · The kneecap doesnt line up with the tissue around it  · Damage to small nerves in the area  · Damage to the ligament-like structure that holds the kneecap in place (retinaculum)  · Breakdown of the bone under the cartilage  · Swelling in the soft tissues around the kneecap  · Injury  You might be more likely to have knee pain if you:  · Exercise a lot  · Recently increased the intensity of your workouts  · Have a body mass index (BMI) greater than 25  · Have poor alignment of your kneecap  · Walk with your feet turned overly outward or inward  · Have weakness in surrounding muscle groups (inner quad or hip adductor muscles)  · Have too much tightness in surrounding muscle groups (hamstrings or iliotibial band)  · Have a recent history of injury to the area  · Are female  Symptoms of knee pain  This type of knee pain is a dull, aching pain in the front of the knee in the area under and around the kneecap. This pain may start quickly or slowly. Your pain might be worse when you squat, run, or sit for a long time. You might also sometimes feel like your knee is giving out. You may have symptoms in one or both of your knees.  Diagnosing knee pain  Your healthcare provider will ask about your medical history and your symptoms. Be sure to describe any activities that make your knee pain worse. He or she will look at your knee. This will include tests of your range of motion, strength, and areas of pain of your knee. Your knee alignment will be checked.  Your healthcare provider will need to rule out other  causes of your knee pain, such as arthritis. You may need an imaging test, such as an X-ray or MRI.  Treatment for knee pain  Treatments that can help ease your symptoms may include:  · Avoiding activities for a while that make your pain worse, returning to activity over time  · Icing the outside of your knee when it causes you pain  · Taking over-the-counter pain medicine  · Wearing a knee brace or taping your knee to support it  · Wearing special shoe inserts to help keep your feet in the proper alignment  · Doing special exercises to stretch and strengthen the muscles around your hip and your knee  These steps help most people manage knee pain. But some cases of knee pain need to be treated with surgery. You may need surgery right away. Or you may need it later if other treatments dont work. Your healthcare provider may refer you to an orthopedic surgeon. He or she will talk with you about your choices.  Preventing knee pain  Losing weight and correcting excess muscle tightness or muscle weakness may help lower your risk.  In some cases, you can prevent knee pain. To help prevent a flare-up of knee pain, you do these things:  · Regularly do all the exercises your doctor or physical therapist advises  · Support your knee as advised by your doctor or physical therapist  · Increase training gradually, and ease up on training when needed  · Have an expert check your gait for running or other sporting activities  · Stretch properly before and after exercise  · Replace your running shoes regularly  · Lose excess weight     When to call your healthcare provider  Call your healthcare provider right away if:  · Your symptoms dont get better after a few weeks of treatment  · You have any new symptoms   Date Last Reviewed: 4/1/2017  © 2327-9342 [employer]. [address] [place] [address], [place] [address]. All rights reserved. This information is not intended as a substitute for professional medical care. Always  follow your healthcare professional's instructions.

## 2020-07-27 NOTE — PATIENT INSTRUCTIONS
Elevated Blood Pressure  Your blood pressure was elevated during your visit to the urgent care.  It was not so high that immediate care was needed but it is recommended that you monitor your blood pressure over the next week or two to make sure that it is not staying elevated.  Please have your blood pressure taken 2-3 times daily at different times of the day.  Write all of those blood pressures down and record the time that they were taken.  Keep all that information and take it with you to see your Primary Care Physician.  If your blood pressure is consistently above 140/90 you will need to follow up with your PCP more quickly    A referral has be placed for you to follow up with Orthopedics. Someone should be contacting you soon to set up appointment. However, you may call [phone] at anytime to schedule this follow up appointment      Knee Pain  Knee pain is very common. Its especially common in active people who put a lot of pressure on their knees, like runners. It affects women more often than men.  Your kneecap (patella) is a thick, round bone. It covers and protects the front portion of your knee joint. It moves along a groove in your thighbone (femur) as part of the patellofemoral joint. A layer of cartilage surrounds the underside of your kneecap. This layer protects it from grinding against your femur.  When this cartilage softens and breaks down, it can cause knee pain. This is partly because of repetitive stress. The stress irritates the lining of the joint. This causes pain in the underlying bone.  What causes knee pain?  Many things can cause knee pain. You may have more than one cause. Some of these include:  · Overuse of the knee joint  · The kneecap doesnt line up with the tissue around it  · Damage to small nerves in the area  · Damage to the ligament-like structure that holds the kneecap in place (retinaculum)  · Breakdown of the bone under the cartilage  · Swelling in the soft tissues  around the kneecap  · Injury  You might be more likely to have knee pain if you:  · Exercise a lot  · Recently increased the intensity of your workouts  · Have a body mass index (BMI) greater than 25  · Have poor alignment of your kneecap  · Walk with your feet turned overly outward or inward  · Have weakness in surrounding muscle groups (inner quad or hip adductor muscles)  · Have too much tightness in surrounding muscle groups (hamstrings or iliotibial band)  · Have a recent history of injury to the area  · Are female  Symptoms of knee pain  This type of knee pain is a dull, aching pain in the front of the knee in the area under and around the kneecap. This pain may start quickly or slowly. Your pain might be worse when you squat, run, or sit for a long time. You might also sometimes feel like your knee is giving out. You may have symptoms in one or both of your knees.  Diagnosing knee pain  Your healthcare provider will ask about your medical history and your symptoms. Be sure to describe any activities that make your knee pain worse. He or she will look at your knee. This will include tests of your range of motion, strength, and areas of pain of your knee. Your knee alignment will be checked.  Your healthcare provider will need to rule out other causes of your knee pain, such as arthritis. You may need an imaging test, such as an X-ray or MRI.  Treatment for knee pain  Treatments that can help ease your symptoms may include:  · Avoiding activities for a while that make your pain worse, returning to activity over time  · Icing the outside of your knee when it causes you pain  · Taking over-the-counter pain medicine  · Wearing a knee brace or taping your knee to support it  · Wearing special shoe inserts to help keep your feet in the proper alignment  · Doing special exercises to stretch and strengthen the muscles around your hip and your knee  These steps help most people manage knee pain. But some cases of knee  pain need to be treated with surgery. You may need surgery right away. Or you may need it later if other treatments dont work. Your healthcare provider may refer you to an orthopedic surgeon. He or she will talk with you about your choices.  Preventing knee pain  Losing weight and correcting excess muscle tightness or muscle weakness may help lower your risk.  In some cases, you can prevent knee pain. To help prevent a flare-up of knee pain, you do these things:  · Regularly do all the exercises your doctor or physical therapist advises  · Support your knee as advised by your doctor or physical therapist  · Increase training gradually, and ease up on training when needed  · Have an expert check your gait for running or other sporting activities  · Stretch properly before and after exercise  · Replace your running shoes regularly  · Lose excess weight     When to call your healthcare provider  Call your healthcare provider right away if:  · Your symptoms dont get better after a few weeks of treatment  · You have any new symptoms   Date Last Reviewed: 4/1/2017  © 9245-8845 [employer]. [address] [place] [address], [place] [address]. All rights reserved. This information is not intended as a substitute for professional medical care. Always follow your healthcare professional's instructions.

## 2020-07-29 ENCOUNTER — TELEPHONE (OUTPATIENT)
Dept: URGENT CARE | Facility: CLINIC | Age: 30
End: 2020-07-29

## 2020-08-03 ENCOUNTER — PATIENT MESSAGE (OUTPATIENT)
Dept: INTERNAL MEDICINE | Facility: CLINIC | Age: 30
End: 2020-08-03

## 2020-08-03 ENCOUNTER — OFFICE VISIT (OUTPATIENT)
Dept: INTERNAL MEDICINE | Facility: CLINIC | Age: 30
End: 2020-08-03
Payer: COMMERCIAL

## 2020-08-03 DIAGNOSIS — Z71.89 ADVICE GIVEN ABOUT COVID-19 VIRUS INFECTION: Primary | ICD-10-CM

## 2020-08-03 PROCEDURE — 99213 OFFICE O/P EST LOW 20 MIN: CPT | Mod: 95,,, | Performed by: FAMILY MEDICINE

## 2020-08-03 PROCEDURE — 99213 PR OFFICE/OUTPT VISIT, EST, LEVL III, 20-29 MIN: ICD-10-PCS | Mod: 95,,, | Performed by: FAMILY MEDICINE

## 2020-08-03 NOTE — PATIENT INSTRUCTIONS
Instructions for patients with confirmed COVID-19    Please isolate yourself at home.  You may leave home and/or return to work once the following conditions are met:    If you were not hospitalized and are not severely immunocompromised*:   More than 10 days since symptoms first appeared AND   More than 24 hours fever free without medications AND   Symptoms have improved     If you were hospitalized OR are severely immunocompromised*:   More than 20 days since symptoms first appeared   More than 24 hrs fever free without medications   Symptoms have improved    If you had no symptoms but tested postive:   More than 10 days since the date of the first positive test (20 days if severely immunocompromised).   If you develop symptoms, then use the guidelines above.    *Definition of severely immunocompromised:  - Current chemotherapy for cancer  - Untreated HIV with CD4 count less than 200  - Combined primary immunodeficiency disorder  - Prednisone more than 20 mg per day for more than 14 days  - Post-transplant patients    Additional instructions:   Separate yourself from other people and animals in your home.   Call ahead before visiting your doctor.   Wear a facemask when around others.   Cover your coughs and sneezes.   Wash your hands often with soap and water; hand  can be used, too.   Avoid sharing personal household items.   Wipe down surfaces used daily.   Monitor your symptoms. Seek prompt medical attention if your illness is worsening (e.g., difficulty breathing).    Before seeking care, call your healthcare provider.   If you have a medical emergency and need to call 911, notify the dispatch personnel that you have, or are being evaluated for COVID-19. If possible, put on a facemask before emergency medical services arrive.

## 2020-08-03 NOTE — TELEPHONE ENCOUNTER
Spoke with pt adviised pt to restart phone and to make sure all apps are closed before clicking on oliver to start visit. Pt gave verbal understanding on instructions.

## 2020-08-03 NOTE — PROGRESS NOTES
Subjective:       Patient ID: Smith Love is a 29 y.o. female.    Chief Complaint: No chief complaint on file.    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   HENT: Negative for hearing loss, rhinorrhea and trouble swallowing.    Eyes: Negative for discharge and visual disturbance.   Respiratory: Negative for chest tightness and wheezing.    Cardiovascular: Negative for chest pain and palpitations.   Gastrointestinal: Negative for blood in stool, constipation, diarrhea and vomiting.   Endocrine: Negative for polydipsia and polyuria.   Genitourinary: Negative for difficulty urinating, dysuria, hematuria and menstrual problem.   Musculoskeletal: Positive for neck pain. Negative for arthralgias and joint swelling.   Neurological: Positive for weakness and headaches.   Psychiatric/Behavioral: Negative for confusion and dysphoric mood.        Objective:   There were no vitals taken for this visit.    BP Readings from Last 3 Encounters:   07/27/20 (!) 157/110   05/11/20 (!) 168/88   08/29/18 124/84       Lab Results   Component Value Date    HGBA1C 5.6 08/29/2018       Physical Exam  Constitutional:       General: She is not in acute distress.  HENT:      Head: Normocephalic and atraumatic.   Neck:      Musculoskeletal: Normal range of motion.   Pulmonary:      Effort: Pulmonary effort is normal. No respiratory distress.      Comments: Able to speak in full sentences without respiratory distress    Neurological:      Mental Status: She is alert.   Psychiatric:         Behavior: Behavior normal.         Thought Content: Thought content normal.         Judgment: Judgment normal.        Assessment:     No diagnosis found.  Plan:     Problem List Items Addressed This Visit     None      The patient location is: home  The chief complaint leading to consultation is: coivd pos    Visit type: audiovisual    Face to Face time with patient: 11:10-11:21  11 minutes of total time spent on the encounter, which includes face to face time and non-face to face time preparing to see the patient (eg, review of tests), Obtaining and/or reviewing separately obtained history, Documenting clinical information in the electronic or other health record, Independently interpreting results (not separately reported) and communicating results to the patient/family/caregiver, or Care coordination (not separately reported).         Each patient to whom he or she provides medical services by telemedicine is:  (1) informed of the relationship between the physician and patient and the respective role of any other health care provider with respect to management of the patient; and (2) notified that he or she may decline to receive medical services by telemedicine and may withdraw from such care at any time.    Notes: concerns re returning to work, teaches first grade. H/o asthma  covid pos, sx are milder with fatigue  Guidance provided      Follow up if symptoms worsen or fail to improve.

## 2020-08-06 RX ORDER — IPRATROPIUM BROMIDE AND ALBUTEROL SULFATE 2.5; .5 MG/3ML; MG/3ML
3 SOLUTION RESPIRATORY (INHALATION) EVERY 6 HOURS PRN
Qty: 1 BOX | Refills: 5 | OUTPATIENT
Start: 2020-08-06 | End: 2021-08-06

## 2020-08-06 NOTE — TELEPHONE ENCOUNTER
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 Pt is also requesting a zpak.

## 2020-08-06 NOTE — TELEPHONE ENCOUNTER
----- Message from Patty Flynn sent at 8/5/2020  8:36 AM CDT -----  Regarding: pt  Pt would like a call from nurse in regarding getting a prescriptions for a Zpac.Pt would like a also like to ask more question pertaining to covid . 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 Please call back at [phone] (home)       [address] [place] - [address] [facility]  [address] [facility]  [place] [address]  Phone: [phone] Fax: [phone]        Thank you,   Patty Flynn

## 2020-08-07 ENCOUNTER — OFFICE VISIT (OUTPATIENT)
Dept: INTERNAL MEDICINE | Facility: CLINIC | Age: 30
End: 2020-08-07
Payer: COMMERCIAL

## 2020-08-07 DIAGNOSIS — J45.20 MILD INTERMITTENT ASTHMA WITHOUT COMPLICATION: ICD-10-CM

## 2020-08-07 DIAGNOSIS — Z71.89 ADVICE GIVEN ABOUT COVID-19 VIRUS INFECTION: ICD-10-CM

## 2020-08-07 DIAGNOSIS — U07.1 COVID-19 VIRUS DETECTED: Primary | ICD-10-CM

## 2020-08-07 PROCEDURE — 99214 OFFICE O/P EST MOD 30 MIN: CPT | Mod: 95,,, | Performed by: FAMILY MEDICINE

## 2020-08-07 PROCEDURE — 99214 PR OFFICE/OUTPT VISIT, EST, LEVL IV, 30-39 MIN: ICD-10-PCS | Mod: 95,,, | Performed by: FAMILY MEDICINE

## 2020-08-07 RX ORDER — IPRATROPIUM BROMIDE AND ALBUTEROL SULFATE 2.5; .5 MG/3ML; MG/3ML
3 SOLUTION RESPIRATORY (INHALATION) EVERY 6 HOURS PRN
Qty: 1 BOX | Refills: 5 | Status: SHIPPED | OUTPATIENT
Start: 2020-08-07 | End: 2020-08-21

## 2020-08-07 NOTE — PROGRESS NOTES
Subjective:       Patient ID: Smith Love is a 29 y.o. female.    Chief Complaint: No chief complaint on file.    HPI  covid pos  Albuterol inhaler as precaution  Noticed chest tightness but resolved  Using inhaler 4x daily  Now back on advair  Would like to try administering breathing treatments but ran out of meds  Denies fever, diarrhea    Has been years since needing to do duoneb tx  Review of Systems   Constitutional: Negative for activity change and unexpected weight change.   HENT: Negative for hearing loss, rhinorrhea and trouble swallowing.    Eyes: Negative for discharge and visual disturbance.   Respiratory: Positive for chest tightness and wheezing.    Cardiovascular: Positive for chest pain. Negative for palpitations.   Gastrointestinal: Negative for blood in stool, constipation, diarrhea and vomiting.   Endocrine: Negative for polydipsia and polyuria.   Genitourinary: Negative for difficulty urinating, dysuria, hematuria and menstrual problem.   Musculoskeletal: Negative for arthralgias, joint swelling and neck pain.   Neurological: Negative for weakness and headaches.   Psychiatric/Behavioral: Negative for confusion and dysphoric mood.        Objective:   There were no vitals taken for this visit.    BP Readings from Last 3 Encounters:   07/27/20 (!) 157/110   05/11/20 (!) 168/88   08/29/18 124/84       Lab Results   Component Value Date    HGBA1C 5.6 08/29/2018       Physical Exam  Vitals signs reviewed.   Constitutional:       General: She is not in acute distress.     Appearance: She is not ill-appearing or toxic-appearing.   HENT:      Head: Normocephalic and atraumatic.   Neck:      Musculoskeletal: Normal range of motion.   Pulmonary:      Effort: Pulmonary effort is normal. No respiratory distress.      Comments: Able to speak in full sentences without respiratory distress    Neurological:      Mental Status: She is alert.   Psychiatric:         Behavior: Behavior normal.          Thought Content: Thought content normal.         Judgment: Judgment normal.       Assessment:     1. COVID-19 virus detected    2. Mild intermittent asthma without complication    3. Advice Given About Covid-19 Virus Infection      Plan:     Problem List Items Addressed This Visit        Pulmonary    Asthma, mild intermittent    Current Assessment & Plan     Has been controlled but uncontrolled with covid infection. Refill duonebs         Relevant Medications    albuterol-ipratropium (DUO-NEB) 2.5 mg-0.5 mg/3 mL nebulizer solution      Other Visit Diagnoses     COVID-19 virus detected    -  Primary    Advice Given About Covid-19 Virus Infection          The patient location is: home  The chief complaint leading to consultation is: covid and med refill    Visit type: audiovisual    Face to Face time with patient: 2:50-  2:50- minutes of total time spent on the encounter, which includes face to face time and non-face to face time preparing to see the patient (eg, review of tests), Obtaining and/or reviewing separately obtained history, Documenting clinical information in the electronic or other health record, Independently interpreting results (not separately reported) and communicating results to the patient/family/caregiver, or Care coordination (not separately reported).         Each patient to whom he or she provides medical services by telemedicine is:  (1) informed of the relationship between the physician and patient and the respective role of any other health care provider with respect to management of the patient; and (2) notified that he or she may decline to receive medical services by telemedicine and may withdraw from such care at any time.    Notes: h/o asthma  Requiring advair and albuterol  Would like to initiate neb tx     Follow up in about 1 week (around 8/14/2020).

## 2020-08-07 NOTE — PATIENT INSTRUCTIONS
2020         TO WHOM IT MAY CONCERN:       RE:    Smith Love,  1990     Smith Love is under my care.     Smith has a health condition(s) that puts her at higher risk for serious illness with COVID-19       REF = https://www.cdc.gov/coronavirus/2019-ncov/specific-groups/people-at-higher-risk.html     Recommendations from the CDC include: [Employers should protect] employees at higher risk for severe illness through supportive policies and practices. Older adults and people of any age who have serious underlying medical conditions are at higher risk for severe illness from COVID-19.  --Support and encourage options to telework, if available.  --Consider offering vulnerable workers duties that minimize their contact with customers and other employees (e.g., restocking shelves rather than working as a ), if the worker agrees to this.  --Offer flexible options such as telework to employees. This will eliminate the need for employees living in higher transmission areas to travel to workplaces in lower transmission areas and vice versa.  --Ensure that any other businesses and employers sharing the same workspace also follow this guidance.       REF: https://www.cdc.gov/coronavirus/2019-ncov/community/guidance-business-response.html     It is my recommendation that Smith be afforded these accommodations.     Please extend to Smith all due courtesies, considerations, and accommodations.     Sincerely,     Luis Eduardo Motley MD    ?

## 2020-08-08 ENCOUNTER — OFFICE VISIT (OUTPATIENT)
Dept: URGENT CARE | Facility: CLINIC | Age: 30
End: 2020-08-08
Payer: COMMERCIAL

## 2020-08-08 VITALS
RESPIRATION RATE: 18 BRPM | TEMPERATURE: 98 F | OXYGEN SATURATION: 96 % | HEART RATE: 102 BPM | BODY MASS INDEX: 36.64 KG/M2 | HEIGHT: 63 IN | WEIGHT: 206.81 LBS

## 2020-08-08 DIAGNOSIS — U07.1 COVID-19 VIRUS INFECTION: Primary | ICD-10-CM

## 2020-08-08 DIAGNOSIS — J45.20 MILD INTERMITTENT ASTHMA WITHOUT COMPLICATION: ICD-10-CM

## 2020-08-08 DIAGNOSIS — R06.02 SHORTNESS OF BREATH: ICD-10-CM

## 2020-08-08 PROCEDURE — 99214 OFFICE O/P EST MOD 30 MIN: CPT | Mod: S$GLB,,, | Performed by: PHYSICIAN ASSISTANT

## 2020-08-08 PROCEDURE — 99214 PR OFFICE/OUTPT VISIT, EST, LEVL IV, 30-39 MIN: ICD-10-PCS | Mod: S$GLB,,, | Performed by: PHYSICIAN ASSISTANT

## 2020-08-08 RX ORDER — PREDNISONE 20 MG/1
20 TABLET ORAL DAILY
Qty: 4 TABLET | Refills: 0 | Status: SHIPPED | OUTPATIENT
Start: 2020-08-08 | End: 2020-08-12

## 2020-08-08 RX ORDER — AZITHROMYCIN 250 MG/1
TABLET, FILM COATED ORAL
Qty: 6 TABLET | Refills: 0 | Status: SHIPPED | OUTPATIENT
Start: 2020-08-08 | End: 2020-08-21

## 2020-08-08 RX ORDER — CEPHALEXIN 250 MG/1
50 CAPSULE ORAL DAILY
COMMUNITY
Start: 2020-08-06 | End: 2020-08-21

## 2020-08-08 NOTE — PATIENT INSTRUCTIONS
Asthma Medicine     Get used to using your inhaled corticosteroid medicine before you brush your teeth. That way you will always rinse your mouth afterward.   Medicines play a key role in controlling asthma. Some help control asthma symptoms and prevent flare-ups. Others are used to treat symptoms when they occur. Always take your medicine as prescribed. Know the names of your medicines and how and when to use them. If you have any questions about your medicines, talk with your healthcare provider or pharmacist.  Quick-relief medicine  Quick-relief (also called rescue) medicines work by relaxing the muscles around the airways. This helps ease symptoms such as coughing, wheezing, and shortness of breath. Keep your quick-relief inhaler with you at all times. Quick-relief medicines:  · Are inhaled when needed and only when needed. Use your quick-relief medicine when you first notice your asthma is getting worse.  · Start to open the airways within a few minutes after you use them.  · Can help stop a flare-up once it has begun.  · May be used before exercise as directed by your healthcare provider.  Long-term control medicine  Long-term control (also called maintenance or controller) medicines help reduce swelling and inflammation of the airways. Some keep the muscles around your airways relaxed. This makes the airways less sensitive to triggers and less likely to flare up. Long-term control medicines:  · Are taken on a schedule. For most people, this is every day. They are taken even when you feel fine.  · Help keep asthma under control so youre less likely to have symptoms.  · Will not stop a flare-up once it has begun.     Inhaled corticosteroids  Inhaled corticosteroids are safe for long-term use. They are not the steroids that you hear about athletes abusing. They usually don't cause serious side effects. Thats because theyre inhaled directly into the lungs. The chance of minor side effects can be  lowered even more if you:  · Use a spacer with your inhaler. Ask your healthcare provider or pharmacist about using a spacer if you don't currently use one.  · Rinse your mouth, gargle, and spit out the water after using your inhaled corticosteroid medicine.  · Follow all instructions for cleaning inhalers and spacers.  · Work with your healthcare provider to find the lowest dose that controls your asthma.      Tips for taking medicine  Remembering to take medicine each day can be hard for anyone. It can be even harder to remember when you dont have symptoms. Try these tips:  · Develop a routine. For example, take long-term controllers as part of getting ready for bed, before you brush your teeth in the morning, or both.  · Make sure you understand what long-term controllers do and dont do.  · Refill your prescriptions on time, or even ahead of time, so you dont run out.  · Carry your quick-relief medicine with you. If you can, keep a spare quick-relief inhaler at work, at school, or in your gym bag.  · When you travel, make sure you have enough medicine to last for your entire trip.  · When traveling by air, keep your medicines with you, not packed in your luggage.  · Make sure you know how to tell if your inhaler is empty. Ask your provider or pharmacist, or check the instructions that come with your inhaler.  Working with your healthcare provider  By working with your healthcare provider, you can get the most benefit from your medicine. Talk with your healthcare provider about:  · Getting the right dose. Over time, your healthcare provider may raise or lower the dose of your controller medicine. The goal is to find the amount of medicine to keep asthma in control, without taking more than is needed.  · Finding the right medicines for you. Each person is unique. It may take a few tries to find the right medicine or combination of medicines for you. If one medicine doesnt work well for you, another may work  better.  · Minimizing side effects. If you have side effects, dont just stop taking your medicine. Instead, call your healthcare provider. A new medicine or change in the amount of medicine may solve the problem.  Date Last Reviewed: 10/1/2016  © 5335-2859 [employer]. [address] [place] [address]. All rights reserved. This information is not intended as a substitute for professional medical care. Always follow your healthcare professional's instructions.

## 2020-08-08 NOTE — PROGRESS NOTES
"Subjective:       Patient ID: Smith Love is a 29 y.o. female.    Vitals:  height is 5' 3" (1.6 m) and weight is 93.8 kg (206 lb 12.7 oz). Her temporal temperature is 98.2 °F (36.8 °C). Her pulse is 102. Her respiration is 18 and oxygen saturation is 96%.     Chief Complaint: Breathing Problem    Pt tested positive for covid last week, but it has been 12 days since symptoms began.  Patient states she is feeling much better since onset of the symptoms.  However patient continues to have intermittent shortness of breath.  She does have history of asthma. Pt has been trying inhaler, was give advir, but is still having symptoms.  Patient states she was just given refill of albuterol nebulizer solution however she has not tried to do breathing treatments yet.  Patient states her cough is much better.  She denies any wheezing, chest pain, chest tightness.  Patient states that she also has history of anxiety and when she starts to worry about COVID her shortness of breath seems to get worse.    Breathing Problem  She complains of cough and difficulty breathing. There is no chest tightness, frequent throat clearing, hemoptysis, hoarse voice, shortness of breath, sputum production or wheezing. This is a new problem. The current episode started in the past 7 days. The problem occurs intermittently. The problem has been gradually worsening. The cough is productive of sputum. Pertinent negatives include no appetite change, chest pain, dyspnea on exertion, ear congestion, ear pain, fever, headaches, heartburn, malaise/fatigue, myalgias, nasal congestion, orthopnea, PND, postnasal drip, rhinorrhea, sneezing, sore throat, sweats, trouble swallowing or weight loss. Her symptoms are aggravated by nothing. Her symptoms are alleviated by nothing. She reports no improvement on treatment. There are no known risk factors for lung disease. Her past medical history is significant for asthma. There is no history of bronchiectasis, " bronchitis, COPD, emphysema or pneumonia.       Constitution: Negative for appetite change, chills, sweating, fatigue and fever.   HENT: Negative for ear pain, congestion, postnasal drip, sinus pain, sinus pressure, sore throat, trouble swallowing and voice change.    Neck: Negative for painful lymph nodes.   Cardiovascular: Negative for chest pain and sob on exertion.   Eyes: Negative for eye redness.   Respiratory: Positive for cough. Negative for chest tightness, sputum production, bloody sputum, COPD, shortness of breath, stridor, wheezing and asthma.    Gastrointestinal: Negative for nausea, vomiting and heartburn.   Musculoskeletal: Negative for muscle ache.   Skin: Negative for rash.   Allergic/Immunologic: Negative for seasonal allergies, asthma and sneezing.   Neurological: Negative for headaches.   Hematologic/Lymphatic: Negative for swollen lymph nodes.       Objective:      Physical Exam   Constitutional: She is oriented to person, place, and time. She appears well-developed. She is cooperative.  Non-toxic appearance. She does not appear ill. No distress.      Comments:Calm and pleasant.  No acute distress.   HENT:   Head: Normocephalic and atraumatic.   Ears:   Right Ear: Hearing, tympanic membrane, external ear and ear canal normal.   Left Ear: Hearing, tympanic membrane, external ear and ear canal normal.   Nose: Nose normal. No mucosal edema, rhinorrhea or nasal deformity. No epistaxis. Right sinus exhibits no maxillary sinus tenderness and no frontal sinus tenderness. Left sinus exhibits no maxillary sinus tenderness and no frontal sinus tenderness.   Mouth/Throat: Uvula is midline, oropharynx is clear and moist and mucous membranes are normal. No trismus in the jaw. Normal dentition. No uvula swelling. No oropharyngeal exudate, posterior oropharyngeal edema or posterior oropharyngeal erythema.   Eyes: Conjunctivae and lids are normal. No scleral icterus.   Neck: Trachea normal, full passive range  of motion without pain and phonation normal. Neck supple. No neck rigidity. No edema and no erythema present.   Cardiovascular: Normal rate, regular rhythm, normal heart sounds and normal pulses.   Pulmonary/Chest: Effort normal and breath sounds normal. No respiratory distress. She has no decreased breath sounds. She has no rhonchi.   Abdominal: Normal appearance.   Musculoskeletal: Normal range of motion.         General: No deformity.   Neurological: She is alert and oriented to person, place, and time. She exhibits normal muscle tone. Coordination normal.   Skin: Skin is warm, dry, intact, not diaphoretic and not pale. Psychiatric: Her speech is normal and behavior is normal. Judgment and thought content normal.   Nursing note and vitals reviewed.        Assessment:       1. COVID-19 virus infection    2. Shortness of breath    3. Mild intermittent asthma without complication        Plan:       Recommend to continue inhaler and nebulizer as needed for shortness of breath or wheezing given history of asthma.  Discussed with patient risk of steroid use with recent COVID infection, however it has been almost 2 weeks so believe steroids may benefit her symptoms.  Begin azithromycin for possible secondary bacterial infection.  Follow-up with PCP if symptoms fail to improve.    COVID-19 virus infection  -     azithromycin (ZITHROMAX Z-RASTA) 250 MG tablet; Take 2 tablets (500 mg) on  Day 1,  followed by 1 tablet (250 mg) once daily on Days 2 through 5.  Dispense: 6 tablet; Refill: 0    Shortness of breath  -     predniSONE (DELTASONE) 20 MG tablet; Take 1 tablet (20 mg total) by mouth once daily. for 4 days  Dispense: 4 tablet; Refill: 0    Mild intermittent asthma without complication  -     predniSONE (DELTASONE) 20 MG tablet; Take 1 tablet (20 mg total) by mouth once daily. for 4 days  Dispense: 4 tablet; Refill: 0      Patient Instructions       Asthma Medicine     Get used to using your inhaled corticosteroid  medicine before you brush your teeth. That way you will always rinse your mouth afterward.   Medicines play a key role in controlling asthma. Some help control asthma symptoms and prevent flare-ups. Others are used to treat symptoms when they occur. Always take your medicine as prescribed. Know the names of your medicines and how and when to use them. If you have any questions about your medicines, talk with your healthcare provider or pharmacist.  Quick-relief medicine  Quick-relief (also called rescue) medicines work by relaxing the muscles around the airways. This helps ease symptoms such as coughing, wheezing, and shortness of breath. Keep your quick-relief inhaler with you at all times. Quick-relief medicines:  · Are inhaled when needed and only when needed. Use your quick-relief medicine when you first notice your asthma is getting worse.  · Start to open the airways within a few minutes after you use them.  · Can help stop a flare-up once it has begun.  · May be used before exercise as directed by your healthcare provider.  Long-term control medicine  Long-term control (also called maintenance or controller) medicines help reduce swelling and inflammation of the airways. Some keep the muscles around your airways relaxed. This makes the airways less sensitive to triggers and less likely to flare up. Long-term control medicines:  · Are taken on a schedule. For most people, this is every day. They are taken even when you feel fine.  · Help keep asthma under control so youre less likely to have symptoms.  · Will not stop a flare-up once it has begun.     Inhaled corticosteroids  Inhaled corticosteroids are safe for long-term use. They are not the steroids that you hear about athletes abusing. They usually don't cause serious side effects. Thats because theyre inhaled directly into the lungs. The chance of minor side effects can be lowered even more if you:  · Use a spacer with your inhaler. Ask your  healthcare provider or pharmacist about using a spacer if you don't currently use one.  · Rinse your mouth, gargle, and spit out the water after using your inhaled corticosteroid medicine.  · Follow all instructions for cleaning inhalers and spacers.  · Work with your healthcare provider to find the lowest dose that controls your asthma.      Tips for taking medicine  Remembering to take medicine each day can be hard for anyone. It can be even harder to remember when you dont have symptoms. Try these tips:  · Develop a routine. For example, take long-term controllers as part of getting ready for bed, before you brush your teeth in the morning, or both.  · Make sure you understand what long-term controllers do and dont do.  · Refill your prescriptions on time, or even ahead of time, so you dont run out.  · Carry your quick-relief medicine with you. If you can, keep a spare quick-relief inhaler at work, at school, or in your gym bag.  · When you travel, make sure you have enough medicine to last for your entire trip.  · When traveling by air, keep your medicines with you, not packed in your luggage.  · Make sure you know how to tell if your inhaler is empty. Ask your provider or pharmacist, or check the instructions that come with your inhaler.  Working with your healthcare provider  By working with your healthcare provider, you can get the most benefit from your medicine. Talk with your healthcare provider about:  · Getting the right dose. Over time, your healthcare provider may raise or lower the dose of your controller medicine. The goal is to find the amount of medicine to keep asthma in control, without taking more than is needed.  · Finding the right medicines for you. Each person is unique. It may take a few tries to find the right medicine or combination of medicines for you. If one medicine doesnt work well for you, another may work better.  · Minimizing side effects. If you have side effects, dont just  stop taking your medicine. Instead, call your healthcare provider. A new medicine or change in the amount of medicine may solve the problem.  Date Last Reviewed: 10/1/2016  © 8829-6780 [employer]. [address] [place] [address], [place] [address]. All rights reserved. This information is not intended as a substitute for professional medical care. Always follow your healthcare professional's instructions.

## 2020-08-09 ENCOUNTER — OFFICE VISIT (OUTPATIENT)
Dept: URGENT CARE | Facility: CLINIC | Age: 30
End: 2020-08-09
Payer: COMMERCIAL

## 2020-08-09 ENCOUNTER — HOSPITAL ENCOUNTER (OUTPATIENT)
Dept: RADIOLOGY | Facility: CLINIC | Age: 30
Discharge: HOME | End: 2020-08-09
Attending: PHYSICIAN ASSISTANT
Payer: COMMERCIAL

## 2020-08-09 VITALS
RESPIRATION RATE: 20 BRPM | BODY MASS INDEX: 36.64 KG/M2 | OXYGEN SATURATION: 97 % | HEART RATE: 106 BPM | HEIGHT: 63 IN | TEMPERATURE: 99 F | WEIGHT: 206.81 LBS

## 2020-08-09 DIAGNOSIS — R06.02 SHORTNESS OF BREATH: Primary | ICD-10-CM

## 2020-08-09 DIAGNOSIS — U07.1 COVID-19 VIRUS INFECTION: ICD-10-CM

## 2020-08-09 PROCEDURE — 71046 XR CHEST PA AND LATERAL: ICD-10-PCS | Mod: S$GLB,,, | Performed by: RADIOLOGY

## 2020-08-09 PROCEDURE — 99214 OFFICE O/P EST MOD 30 MIN: CPT | Mod: S$GLB,,, | Performed by: PHYSICIAN ASSISTANT

## 2020-08-09 PROCEDURE — 71046 X-RAY EXAM CHEST 2 VIEWS: CPT | Mod: S$GLB,,, | Performed by: RADIOLOGY

## 2020-08-09 PROCEDURE — 99214 PR OFFICE/OUTPT VISIT, EST, LEVL IV, 30-39 MIN: ICD-10-PCS | Mod: S$GLB,,, | Performed by: PHYSICIAN ASSISTANT

## 2020-08-09 NOTE — PROGRESS NOTES
"Subjective:       Patient ID: Smith Love is a 29 y.o. female.    Vitals:  height is 5' 3" (1.6 m) and weight is 93.8 kg (206 lb 12.7 oz). Her temporal temperature is 98.7 °F (37.1 °C). Her pulse is 106. Her respiration is 20 and oxygen saturation is 97%.     Chief Complaint: Breathing Problem    Patient is 21-year-old female with history of COVID infection an asthma who presents to clinic for continued shortness of breath.  Patient was seen yesterday (see clinic note) and was told to continue using Advair inhaler, nebulizer and was also given azithromycin and prednisone as needed for wheezing and shortness of breath.  Patient began taking azithromycin has been using her inhaler and nebulizer treatments.  She has not started taking any steroids.  Patient states that her dad was seen yesterday and was diagnosed with pneumonia due to likely COVID infection.  Patient states that she began to get very worried that she to may have pneumonia and wanting a chest x-ray done.  No new symptoms since yesterday's appointment.    Breathing Problem  She complains of difficulty breathing and shortness of breath. There is no chest tightness, cough, sputum production or wheezing. This is a new problem. The current episode started in the past 7 days. The problem has been unchanged. Associated symptoms include dyspnea on exertion. Pertinent negatives include no chest pain, fever, headaches, malaise/fatigue, myalgias, nasal congestion, postnasal drip, rhinorrhea, sneezing, sore throat or trouble swallowing.       Constitution: Negative for fever.   HENT: Negative for postnasal drip, sore throat and trouble swallowing.    Cardiovascular: Positive for sob on exertion. Negative for chest pain.   Respiratory: Positive for shortness of breath. Negative for cough, sputum production and wheezing.    Musculoskeletal: Negative for muscle ache.   Allergic/Immunologic: Negative for sneezing.   Neurological: Negative for headaches.     "   Objective:      Physical Exam   Constitutional: She is oriented to person, place, and time. She appears well-developed. She is cooperative.  Non-toxic appearance. She does not appear ill. No distress.   HENT:   Head: Normocephalic and atraumatic.   Ears:   Right Ear: Hearing, tympanic membrane, external ear and ear canal normal.   Left Ear: Hearing, tympanic membrane, external ear and ear canal normal.   Nose: Nose normal. No mucosal edema, rhinorrhea or nasal deformity. No epistaxis. Right sinus exhibits no maxillary sinus tenderness and no frontal sinus tenderness. Left sinus exhibits no maxillary sinus tenderness and no frontal sinus tenderness.   Mouth/Throat: Uvula is midline, oropharynx is clear and moist and mucous membranes are normal. No trismus in the jaw. Normal dentition. No uvula swelling. No oropharyngeal exudate, posterior oropharyngeal edema or posterior oropharyngeal erythema.   Eyes: Conjunctivae and lids are normal. No scleral icterus.   Neck: Trachea normal, full passive range of motion without pain and phonation normal. Neck supple. No neck rigidity. No edema and no erythema present.   Cardiovascular: Normal rate, regular rhythm, normal heart sounds and normal pulses.   Pulmonary/Chest: Effort normal and breath sounds normal. No respiratory distress. She has no decreased breath sounds. She has no rhonchi.   Abdominal: Normal appearance.   Musculoskeletal: Normal range of motion.         General: No deformity.   Neurological: She is alert and oriented to person, place, and time. She exhibits normal muscle tone. Coordination normal.   Skin: Skin is warm, dry, intact, not diaphoretic and not pale. Psychiatric: Her speech is normal and behavior is normal. Judgment and thought content normal.   Nursing note and vitals reviewed.  Xr Chest Pa And Lateral    Result Date: 8/9/2020  EXAMINATION: XR CHEST PA AND LATERAL CLINICAL HISTORY: Shortness of breath TECHNIQUE: PA and lateral views of the chest  were performed. COMPARISON: 01/20/2015 FINDINGS: The cardiac and mediastinal silhouettes appear within normal limits.   The lungs are clear bilaterally.  No acute osseous findings demonstrated.     No acute findings. Electronically signed by: Mark Upton MD Date:    08/09/2020 Time:    15:40        Assessment:       1. Shortness of breath    2. COVID-19 virus infection        Plan:       Discussed chest x-ray findings with the patient.  Recommend to continue current treatment plan and use inhaler and nebulizer treatments as needed.  Continue azithromycin until completed.  Vital signs are stable.  If patient begins to experience severe shortness of breath, chest pain or wheezing recommend ER for further evaluation.  Patient voiced understanding.  Shortness of breath  -     XR CHEST PA AND LATERAL; Future; Expected date: 08/09/2020    COVID-19 virus infection  -     XR CHEST PA AND LATERAL; Future; Expected date: 08/09/2020

## 2020-08-11 ENCOUNTER — TELEPHONE (OUTPATIENT)
Dept: OBSTETRICS AND GYNECOLOGY | Facility: CLINIC | Age: 30
End: 2020-08-11

## 2020-08-11 ENCOUNTER — TELEPHONE (OUTPATIENT)
Dept: URGENT CARE | Facility: CLINIC | Age: 30
End: 2020-08-11

## 2020-08-11 NOTE — TELEPHONE ENCOUNTER
Courtsey call,pt stated she's feeling a little better.SOB still present but not as bad. Express to patient if she has any concerns please give us or her pcp a call.

## 2020-08-11 NOTE — TELEPHONE ENCOUNTER
----- Message from Iraj Redmond NP sent at 8/11/2020  9:17 AM CDT -----  Pt is on my [place] schedule for tomorrow at 430.  If she would like to be seen there she needs to be moved to someone else's schedule.  Also, if she would like to come to [facility] she is more than welcome to, but my last appt is at 415.  Thanks.    Iraj

## 2020-08-17 ENCOUNTER — PATIENT MESSAGE (OUTPATIENT)
Dept: INTERNAL MEDICINE | Facility: CLINIC | Age: 30
End: 2020-08-17

## 2020-08-21 ENCOUNTER — OFFICE VISIT (OUTPATIENT)
Dept: OBSTETRICS AND GYNECOLOGY | Facility: CLINIC | Age: 30
End: 2020-08-21
Payer: COMMERCIAL

## 2020-08-21 VITALS
WEIGHT: 186.31 LBS | DIASTOLIC BLOOD PRESSURE: 86 MMHG | HEIGHT: 65 IN | BODY MASS INDEX: 31.04 KG/M2 | SYSTOLIC BLOOD PRESSURE: 122 MMHG

## 2020-08-21 DIAGNOSIS — Z30.41 SURVEILLANCE OF PREVIOUSLY PRESCRIBED CONTRACEPTIVE PILL: ICD-10-CM

## 2020-08-21 DIAGNOSIS — Z01.419 ENCOUNTER FOR GYNECOLOGICAL EXAMINATION WITHOUT ABNORMAL FINDING: Primary | ICD-10-CM

## 2020-08-21 DIAGNOSIS — Z11.3 ENCOUNTER FOR SCREENING FOR INFECTIONS WITH PREDOMINANTLY SEXUAL MODE OF TRANSMISSION: ICD-10-CM

## 2020-08-21 PROCEDURE — 99395 PREV VISIT EST AGE 18-39: CPT | Mod: S$GLB,,, | Performed by: NURSE PRACTITIONER

## 2020-08-21 PROCEDURE — 99999 PR PBB SHADOW E&M-EST. PATIENT-LVL III: CPT | Mod: PBBFAC,,, | Performed by: NURSE PRACTITIONER

## 2020-08-21 PROCEDURE — 99395 PR PREVENTIVE VISIT,EST,18-39: ICD-10-PCS | Mod: S$GLB,,, | Performed by: NURSE PRACTITIONER

## 2020-08-21 PROCEDURE — 99999 PR PBB SHADOW E&M-EST. PATIENT-LVL III: ICD-10-PCS | Mod: PBBFAC,,, | Performed by: NURSE PRACTITIONER

## 2020-08-21 PROCEDURE — 88175 CYTOPATH C/V AUTO FLUID REDO: CPT

## 2020-08-21 PROCEDURE — 87491 CHLMYD TRACH DNA AMP PROBE: CPT

## 2020-08-21 RX ORDER — ACETAMINOPHEN AND CODEINE PHOSPHATE 120; 12 MG/5ML; MG/5ML
1 SOLUTION ORAL DAILY
Qty: 28 TABLET | Refills: 12 | Status: SHIPPED | OUTPATIENT
Start: 2020-08-21 | End: 2022-05-30

## 2020-08-21 NOTE — PROGRESS NOTES
Subjective:       Patient ID: Smith Love is a 29 y.o. female.    Chief Complaint:  Well Woman and Medication Management (3 month med check )      History of Present Illness  HPI  Annual Exam-Premenopausal   present for annual exam and med check -- pt was started on the minipill three months ago r/t elevated blood pressure reading. The patient has no complaints today.     The patient is sexually active, with one part.  They have been together for five years, but broke up and are recently back together.  She does desire G/C testing today, no blood work. GYN screening history: no prior history of gyn screening tests. No STD hx.  The patient wears seatbelts: yes. The patient participates in regular exercise: yes. Has the patient ever been transfused or tattooed?: no. The patient reports that there is not domestic violence in her life.    Reports that with the minipill she feels good and denies any side effects.  Takes them regularly with using flow oliver as a reminder.  Pleased with them and wishes to continue.  Cycles have continued to be regular; menses 5-7d; Pads-reg changing 2-3x/day for cleanliness; denies dysmenorrhea, headaches, n/v, clots, flooding    She has been exercising regularly and changed her diet -- very low carb, no dairy, no red meat, only seafood; lost 35# prior to vacation.  BP is improved and WNL!!    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    GYN & OB History  Patient's last menstrual period was 2020 (approximate).   Date of Last Pap: No result found    OB History    Para Term  AB Living   0 0 0 0 0 0   SAB TAB Ectopic Multiple Live Births   0 0 0 0 0       Review of Systems  Review of Systems   Constitutional: Negative for activity change, appetite change, chills and fatigue.   HENT: Negative for nasal congestion and mouth sores.    Respiratory: Negative for cough, shortness of breath and wheezing.    Cardiovascular: Negative for chest pain.   Gastrointestinal:  Negative for abdominal pain, constipation, diarrhea, nausea and vomiting.   Endocrine: Negative for hair loss and hot flashes.   Genitourinary: Negative for bladder incontinence, decreased libido, dysmenorrhea, dyspareunia, dysuria, flank pain, frequency, genital sores, hematuria, menorrhagia, menstrual problem, pelvic pain, urgency, vaginal bleeding, vaginal discharge, vaginal pain, urinary incontinence, postcoital bleeding, vaginal dryness and vaginal odor.   Integumentary:  Negative for breast mass, nipple discharge, breast skin changes and breast tenderness.   Neurological: Negative for headaches.   All other systems reviewed and are negative.  Breast: Negative for breast self exam, lump, mass, mastodynia, nipple discharge, skin changes and tenderness          Objective:      Physical Exam:   Constitutional: She is oriented to person, place, and time. She appears well-developed and well-nourished. No distress.    HENT:   Head: Normocephalic and atraumatic.   Nose: Nose normal.    Eyes: Pupils are equal, round, and reactive to light. Conjunctivae and EOM are normal. Right eye exhibits no discharge. Left eye exhibits no discharge.    Neck: Normal range of motion. Neck supple.    Cardiovascular: Normal rate, regular rhythm and normal heart sounds.     Pulmonary/Chest: Effort normal and breath sounds normal. No respiratory distress. She has no decreased breath sounds. She has no wheezes. She has no rhonchi. She has no rales. She exhibits no tenderness. Right breast exhibits no inverted nipple, no mass, no nipple discharge, no skin change and no tenderness. Left breast exhibits no inverted nipple, no mass, no nipple discharge, no skin change and no tenderness.        Abdominal: Soft. Bowel sounds are normal. She exhibits no distension. There is no abdominal tenderness. Hernia confirmed negative in the right inguinal area and confirmed negative in the left inguinal area.     Genitourinary:    Inguinal canal, uterus,  right adnexa, left adnexa and rectum normal.   Rectum:      No external hemorrhoid.      Pelvic exam was performed with patient supine.   There is no rash, tenderness, lesion or injury on the right labia. There is no rash, tenderness, lesion or injury on the left labia. Uterus is not enlarged and not tender. Cervix is normal. There is a normal right adnexa and a normal left adnexa. Right adnexum displays no mass, no tenderness and no fullness. Left adnexum displays no mass, no tenderness and no fullness. No erythema, tenderness or bleeding in the vagina.    No foreign body in the vagina.      No signs of injury in the vagina.   Labial bartholins normal.Cervix exhibits no motion tenderness, no discharge and no friability. Additional cervical findings: pap smear done   Genitourinary Comments: Uterus retroflexed   positive for vaginal discharge (small amt milky white, no odor)       Uterus Size: 6 cm   Musculoskeletal: Normal range of motion and moves all extremeties.      Lymphadenopathy: No inguinal adenopathy noted on the right or left side.    Neurological: She is alert and oriented to person, place, and time.    Skin: Skin is warm and dry. No rash noted. She is not diaphoretic. No erythema. No pallor.    Psychiatric: She has a normal mood and affect. Her speech is normal and behavior is normal. Judgment and thought content normal.           Assessment:        1. Encounter for gynecological examination without abnormal finding    2. Encounter for screening for infections with predominantly sexual mode of transmission    3. Surveillance of previously prescribed contraceptive pill               Plan:   Reviewed updated recommendations for pap smears (every 3 years) in low risk patients.  Recommend annual pelvic exams.  Reviewed recommendations for annual CBE.  Next pap due in 2023.   RTC 1 year or sooner prn.    Refill sent for OCPs    Encounter for gynecological examination without abnormal finding  -      Liquid-Based Pap Smear, Screening  -     C. trachomatis/N. gonorrhoeae by AMP DNA  -     norethindrone (MICRONOR) 0.35 mg tablet; Take 1 tablet (0.35 mg total) by mouth once daily.  Dispense: 28 tablet; Refill: 12    Encounter for screening for infections with predominantly sexual mode of transmission  -     C. trachomatis/N. gonorrhoeae by AMP DNA    Surveillance of previously prescribed contraceptive pill  -     norethindrone (MICRONOR) 0.35 mg tablet; Take 1 tablet (0.35 mg total) by mouth once daily.  Dispense: 28 tablet; Refill: 12

## 2020-08-31 ENCOUNTER — OFFICE VISIT (OUTPATIENT)
Dept: INTERNAL MEDICINE | Facility: CLINIC | Age: 30
End: 2020-08-31
Payer: COMMERCIAL

## 2020-08-31 VITALS
OXYGEN SATURATION: 100 % | WEIGHT: 182.13 LBS | RESPIRATION RATE: 18 BRPM | DIASTOLIC BLOOD PRESSURE: 88 MMHG | TEMPERATURE: 98 F | BODY MASS INDEX: 30.3 KG/M2 | HEART RATE: 69 BPM | SYSTOLIC BLOOD PRESSURE: 142 MMHG

## 2020-08-31 DIAGNOSIS — Z76.89 ESTABLISHING CARE WITH NEW DOCTOR, ENCOUNTER FOR: Primary | ICD-10-CM

## 2020-08-31 DIAGNOSIS — Z00.00 ROUTINE PHYSICAL EXAMINATION: ICD-10-CM

## 2020-08-31 DIAGNOSIS — J45.20 MILD INTERMITTENT ASTHMA WITHOUT COMPLICATION: ICD-10-CM

## 2020-08-31 DIAGNOSIS — E66.3 OVERWEIGHT: ICD-10-CM

## 2020-08-31 PROCEDURE — 3079F DIAST BP 80-89 MM HG: CPT | Mod: CPTII,S$GLB,, | Performed by: FAMILY MEDICINE

## 2020-08-31 PROCEDURE — 3079F PR MOST RECENT DIASTOLIC BLOOD PRESSURE 80-89 MM HG: ICD-10-PCS | Mod: CPTII,S$GLB,, | Performed by: FAMILY MEDICINE

## 2020-08-31 PROCEDURE — 99999 PR PBB SHADOW E&M-EST. PATIENT-LVL III: CPT | Mod: PBBFAC,,, | Performed by: FAMILY MEDICINE

## 2020-08-31 PROCEDURE — 3077F SYST BP >= 140 MM HG: CPT | Mod: CPTII,S$GLB,, | Performed by: FAMILY MEDICINE

## 2020-08-31 PROCEDURE — 3077F PR MOST RECENT SYSTOLIC BLOOD PRESSURE >= 140 MM HG: ICD-10-PCS | Mod: CPTII,S$GLB,, | Performed by: FAMILY MEDICINE

## 2020-08-31 PROCEDURE — 99395 PR PREVENTIVE VISIT,EST,18-39: ICD-10-PCS | Mod: S$GLB,,, | Performed by: FAMILY MEDICINE

## 2020-08-31 PROCEDURE — 99999 PR PBB SHADOW E&M-EST. PATIENT-LVL III: ICD-10-PCS | Mod: PBBFAC,,, | Performed by: FAMILY MEDICINE

## 2020-08-31 PROCEDURE — 99395 PREV VISIT EST AGE 18-39: CPT | Mod: S$GLB,,, | Performed by: FAMILY MEDICINE

## 2020-09-04 NOTE — PROGRESS NOTES
Please call and advise patient to schedule appointment in Coumadin clinic as soon as possible. Subjective:       Patient ID: Felicianoyiboris Love is a 29 y.o. female.    Chief Complaint: Establish Care    HPI Ms. Love presents today to establish care.  She has a medical history as listed below.     COVID last month which affected her Asthma.   She is doing much better now.   Establishing with pulmonary     She has had elevated BP readings.   Not on medication     She has lost weight  Working to not be on medication for blood pressure.     Review of Systems   Constitutional: Negative for activity change and unexpected weight change.   HENT: Negative for hearing loss, rhinorrhea and trouble swallowing.    Eyes: Negative for discharge and visual disturbance.   Respiratory: Negative for chest tightness and wheezing.    Cardiovascular: Negative for chest pain and palpitations.   Gastrointestinal: Negative for blood in stool, constipation, diarrhea and vomiting.   Endocrine: Negative for polydipsia and polyuria.   Genitourinary: Negative for difficulty urinating, dysuria, hematuria and menstrual problem.   Musculoskeletal: Negative for arthralgias, joint swelling and neck pain.   Neurological: Negative for weakness and headaches.   Psychiatric/Behavioral: Negative for confusion and dysphoric mood.         Past Medical History:   Diagnosis Date    Asthma     COVID-19 08/2020    Essential hypertension 4/27/2017    Right knee injury 07/27/2020    Staph aureus infection      No past surgical history on file.  Family History   Problem Relation Age of Onset    Hypertension Maternal Grandmother     No Known Problems Mother     Hypertension Father      Social History     Socioeconomic History    Marital status: Single     Spouse name: Not on file    Number of children: Not on file    Years of education: Not on file    Highest education level: Not on file   Occupational History    Not on file   Social Needs    Financial resource strain: Not very hard    Food insecurity     Worry: Never true     Inability:  Never true    Transportation needs     Medical: No     Non-medical: No   Tobacco Use    Smoking status: Never Smoker    Smokeless tobacco: Never Used   Substance and Sexual Activity    Alcohol use: Yes     Frequency: Monthly or less     Drinks per session: 1 or 2     Binge frequency: Never     Comment: socially    Drug use: No    Sexual activity: Yes     Partners: Male     Birth control/protection: Condom, Pill   Lifestyle    Physical activity     Days per week: 6 days     Minutes per session: 30 min    Stress: Only a little   Relationships    Social connections     Talks on phone: Three times a week     Gets together: Once a week     Attends [employer] service: Not on file     Active member of club or organization: No     Attends meetings of clubs or organizations: Never     Relationship status: Never    Other Topics Concern    Not on file   Social History Narrative    Not on file       Objective:        Physical Exam  Vitals signs and nursing note reviewed.   Constitutional:       General: She is not in acute distress.  HENT:      Head: Normocephalic and atraumatic.      Right Ear: External ear normal.      Left Ear: External ear normal.      Nose: Nose normal.   Eyes:      General:         Right eye: No discharge.         Left eye: No discharge.      Pupils: Pupils are equal, round, and reactive to light.   Neck:      Musculoskeletal: Normal range of motion and neck supple.      Thyroid: No thyromegaly.   Cardiovascular:      Rate and Rhythm: Normal rate and regular rhythm.      Heart sounds: Normal heart sounds. No murmur.   Pulmonary:      Effort: Pulmonary effort is normal. No respiratory distress.      Breath sounds: Normal breath sounds. No wheezing.   Abdominal:      General: Bowel sounds are normal. There is no distension.      Palpations: Abdomen is soft.      Tenderness: There is no abdominal tenderness.   Musculoskeletal: Normal range of motion.   Skin:     General: Skin is warm and  dry.      Findings: No rash.   Neurological:      Mental Status: She is alert and oriented to person, place, and time.      Coordination: Coordination normal.   Psychiatric:         Behavior: Behavior normal.           Results for orders placed or performed in visit on 08/29/18   Lipid panel   Result Value Ref Range    Cholesterol 109 (L) 120 - 199 mg/dL    Triglycerides 44 30 - 150 mg/dL    HDL 33 (L) 40 - 75 mg/dL    LDL Cholesterol 67.2 63.0 - 159.0 mg/dL    Hdl/Cholesterol Ratio 30.3 20.0 - 50.0 %    Total Cholesterol/HDL Ratio 3.3 2.0 - 5.0    Non-HDL Cholesterol 76 mg/dL   Hemoglobin A1c   Result Value Ref Range    Hemoglobin A1C 5.6 4.0 - 5.6 %    Estimated Avg Glucose 114 68 - 131 mg/dL   Glucose, fasting   Result Value Ref Range    Glucose, Fasting 148 (H) 70 - 110 mg/dL       Assessment/Plan:     Establishing care with new doctor, encounter for    Routine physical examination  -     Comprehensive metabolic panel; Future; Expected date: 08/31/2020  -     CBC auto differential; Future; Expected date: 08/31/2020  -     Hemoglobin A1C; Future; Expected date: 08/31/2020  -     Lipid Panel; Future; Expected date: 08/31/2020  -     HIV 1/2 Ag/Ab (4th Gen); Future; Expected date: 08/31/2020    Overweight  -     Hemoglobin A1C; Future; Expected date: 08/31/2020    Mild intermittent asthma without complication    pt is scheduled with pulmonary to establish care      Follow up as needed    Stephanie Schwartz MD  ON   Family Medicine

## 2020-09-05 LAB
FINAL PATHOLOGIC DIAGNOSIS: NORMAL
Lab: NORMAL

## 2020-09-09 ENCOUNTER — OFFICE VISIT (OUTPATIENT)
Dept: PULMONOLOGY | Facility: CLINIC | Age: 30
End: 2020-09-09
Payer: COMMERCIAL

## 2020-09-09 VITALS
DIASTOLIC BLOOD PRESSURE: 82 MMHG | SYSTOLIC BLOOD PRESSURE: 136 MMHG | RESPIRATION RATE: 18 BRPM | HEIGHT: 65 IN | WEIGHT: 181.44 LBS | BODY MASS INDEX: 30.23 KG/M2 | HEART RATE: 67 BPM | OXYGEN SATURATION: 99 %

## 2020-09-09 DIAGNOSIS — J45.909 NOT WELL CONTROLLED ASTHMA WITHOUT COMPLICATION: Primary | ICD-10-CM

## 2020-09-09 DIAGNOSIS — Z86.16 HISTORY OF 2019 NOVEL CORONAVIRUS DISEASE (COVID-19): ICD-10-CM

## 2020-09-09 DIAGNOSIS — Z82.5 FAMILY HISTORY OF ASTHMA: ICD-10-CM

## 2020-09-09 PROCEDURE — 99999 PR PBB SHADOW E&M-EST. PATIENT-LVL III: ICD-10-PCS | Mod: PBBFAC,,, | Performed by: INTERNAL MEDICINE

## 2020-09-09 PROCEDURE — 3008F BODY MASS INDEX DOCD: CPT | Mod: CPTII,S$GLB,, | Performed by: INTERNAL MEDICINE

## 2020-09-09 PROCEDURE — 99204 PR OFFICE/OUTPT VISIT, NEW, LEVL IV, 45-59 MIN: ICD-10-PCS | Mod: S$GLB,,, | Performed by: INTERNAL MEDICINE

## 2020-09-09 PROCEDURE — 3079F DIAST BP 80-89 MM HG: CPT | Mod: CPTII,S$GLB,, | Performed by: INTERNAL MEDICINE

## 2020-09-09 PROCEDURE — 3008F PR BODY MASS INDEX (BMI) DOCUMENTED: ICD-10-PCS | Mod: CPTII,S$GLB,, | Performed by: INTERNAL MEDICINE

## 2020-09-09 PROCEDURE — 3079F PR MOST RECENT DIASTOLIC BLOOD PRESSURE 80-89 MM HG: ICD-10-PCS | Mod: CPTII,S$GLB,, | Performed by: INTERNAL MEDICINE

## 2020-09-09 PROCEDURE — 99204 OFFICE O/P NEW MOD 45 MIN: CPT | Mod: S$GLB,,, | Performed by: INTERNAL MEDICINE

## 2020-09-09 PROCEDURE — 3075F PR MOST RECENT SYSTOLIC BLOOD PRESS GE 130-139MM HG: ICD-10-PCS | Mod: CPTII,S$GLB,, | Performed by: INTERNAL MEDICINE

## 2020-09-09 PROCEDURE — 99999 PR PBB SHADOW E&M-EST. PATIENT-LVL III: CPT | Mod: PBBFAC,,, | Performed by: INTERNAL MEDICINE

## 2020-09-09 PROCEDURE — 3075F SYST BP GE 130 - 139MM HG: CPT | Mod: CPTII,S$GLB,, | Performed by: INTERNAL MEDICINE

## 2020-09-09 RX ORDER — FLUTICASONE PROPIONATE AND SALMETEROL 250; 50 UG/1; UG/1
1 POWDER RESPIRATORY (INHALATION) 2 TIMES DAILY
Qty: 180 EACH | Refills: 3 | Status: SHIPPED | OUTPATIENT
Start: 2020-09-09 | End: 2022-01-28 | Stop reason: SDUPTHER

## 2020-09-09 NOTE — PATIENT INSTRUCTIONS
Work Policy for Pulmonary Patients.  From the current data available on COVID-19, there are no specific recommendations regarding a pulmonary patients above the general population.  Just like anyone, if your employer has a work from home policy, we highly encourage everyone to work from home and self-isolate.  If your job is essential or your employer does not allow you to work from home, we are not issuing notes to work from home based on pulmonary diagnosis at this time. Be sure to wash your hands, use hand , do not touch your face and use precautions.  Your employer needs to consider how best to decrease the spread of acute respiratory illness and lower the impact of COVID-19 in the workplace and follow the recommendation by the CDC which may change daily. Employers may consider to use sick leave and other paid time off as well as allow remote work where possible. These options may or may not be feasible for employers depending on the nature of their business. Unfortunately, as a medical provider, we can not make those decisions.   If you work in healthcare, the CDC recommends that all healthcare personnel who enter a room of a patient with known or suspected Covid-19, should adhere to the standard contact, droplet and airborne precautions by wearing a gown, gloves and an appropriate mask. These recommendations are the same among all individuals. Facilities may want to consider limiting their exposure to patients with confirmed or suspected COVID-19, especially during higher risk procedures (e.g., aerosol-generating procedures). However, in settings with a higher burden of disease or limited staffing, this may not be feasible.        Please remember to CALL BEFORE YOU ACT  Dial [phone] or Text keyword [phone] to [phone] for general questions and information.  If you have symptoms, call the [facility] Covid-19 Line ([phone]) [facility] ([email or web address])

## 2020-09-09 NOTE — PROGRESS NOTES
Initial Outpatient Pulmonary Evaluation       SUBJECTIVE:     Chief Complaint   Patient presents with    Asthma       History of Present Illness:    Patient is a 29 y.o. female with history of asthma since childhood, presenting for uncontrolled asthma.    Patient has been on albuterol p.r.n., last ER presentation for asthma was in 2016.    She was diagnosed with COVID-19 infection July 29, 2020, was treated with prednisone and Advair which she discontinued a month ago after recovering from COVID toward mid August.    Has been feeling worse and having more exercise intolerance since she had quit using Advair.    Does have a brother and father with asthma.    Works as a teacher.    Not a smoker.    Does have dogs at home.        Review of Systems   Constitutional: Positive for fatigue. Negative for fever and chills.   HENT: Negative for nosebleeds.    Eyes: Negative for redness.   Respiratory: Positive for cough, shortness of breath, dyspnea on extertion and use of rescue inhaler. Negative for choking.    Cardiovascular: Negative for chest pain.   Genitourinary: Negative for hematuria.   Endocrine: Negative for cold intolerance.    Musculoskeletal: Negative for arthralgias.   Gastrointestinal: Negative for vomiting.   Neurological: Negative for syncope.   Hematological: Negative for adenopathy.   Psychiatric/Behavioral: Negative for confusion.       Review of patient's allergies indicates:  No Known Allergies    Current Outpatient Medications   Medication Sig Dispense Refill    norethindrone (MICRONOR) 0.35 mg tablet Take 1 tablet (0.35 mg total) by mouth once daily. 28 tablet 12    fluticasone-salmeterol diskus inhaler 250-50 mcg Inhale 1 puff into the lungs 2 (two) times daily. Controller 180 each 3     No current facility-administered medications for this visit.        Past Medical History:   Diagnosis Date    Asthma     COVID-19 08/2020    Essential hypertension  "4/27/2017    Right knee injury 07/27/2020    Staph aureus infection      History reviewed. No pertinent surgical history.  Family History   Problem Relation Age of Onset    Hypertension Maternal Grandmother     No Known Problems Mother     Hypertension Father      Social History     Tobacco Use    Smoking status: Never Smoker    Smokeless tobacco: Never Used   Substance Use Topics    Alcohol use: Yes     Frequency: Monthly or less     Drinks per session: 1 or 2     Binge frequency: Never     Comment: socially    Drug use: No          OBJECTIVE:     Vital Signs (Most Recent)  Vital Signs  Pulse: 67  Resp: 18  SpO2: 99 %  BP: 136/82  Height and Weight  Height: 5' 5" (165.1 cm)  Weight: 82.3 kg (181 lb 7 oz)  BSA (Calculated - sq m): 1.94 sq meters  BMI (Calculated): 30.2  Weight in (lb) to have BMI = 25: 149.9]  Wt Readings from Last 2 Encounters:   09/09/20 82.3 kg (181 lb 7 oz)   08/31/20 82.6 kg (182 lb 1.6 oz)         Physical Exam:  Physical Exam   Constitutional: She is oriented to person, place, and time. She appears well-developed.   HENT:   Head: Normocephalic.   Neck: Neck supple.   Cardiovascular: Normal rate.   Pulmonary/Chest: Normal expansion. No stridor. No respiratory distress. She exhibits no tenderness.   Abdominal: Soft.   Musculoskeletal:         General: No tenderness.   Lymphadenopathy:     She has no cervical adenopathy.   Neurological: She is alert and oriented to person, place, and time. Gait normal.   Skin: Skin is warm.   Psychiatric: She has a normal mood and affect. Her behavior is normal. Judgment and thought content normal.   Nursing note and vitals reviewed.      Laboratory  Lab Results   Component Value Date    WBC 6.30 09/11/2015    RBC 4.79 09/11/2015    HGB 14.4 09/11/2015    HCT 41.3 09/11/2015    MCV 86 09/11/2015    MCH 30.1 09/11/2015    MCHC 34.9 09/11/2015    RDW 11.9 09/11/2015     09/11/2015    MPV 11.0 09/11/2015    GRAN 3.3 09/11/2015    GRAN 53.0 " 09/11/2015    LYMPH 2.3 09/11/2015    LYMPH 37.1 09/11/2015    MONO 0.5 09/11/2015    MONO 8.3 09/11/2015    EOS 0.1 09/11/2015    BASO 0.02 09/11/2015    EOSINOPHIL 1.3 09/11/2015    BASOPHIL 0.3 09/11/2015       BMP  Lab Results   Component Value Date     09/11/2015    K 3.7 09/11/2015     09/11/2015    CO2 25 09/11/2015    BUN 13 09/11/2015    CREATININE 0.9 09/11/2015    CALCIUM 9.7 09/11/2015    ANIONGAP 8 09/11/2015    ESTGFRAFRICA >60 09/11/2015    EGFRNONAA >60 09/11/2015    AST 16 09/11/2015    ALT 26 09/11/2015    PROT 8.5 (H) 09/11/2015       No results found for: BNP    No results found for: TSH    No results found for: SEDRATE    No results found for: CRP    No results found for: IGE    No results found for: ASPERGILLUS  No results found for: AFUMIGATUSCL     No results found for: ACE  July 29 th 2020    COVID +       Diagnostic Results:  I have personally reviewed today the following studies :      CXR 8/9/2020:    The cardiac and mediastinal silhouettes appear within normal limits.   The lungs are clear bilaterally.  No acute osseous findings demonstrated      ASSESSMENT/PLAN:     Not well controlled asthma without complication  -     COVID-19 Routine Screening; Future  -     Complete PFT without bronchodilator; Future  -     fluticasone-salmeterol diskus inhaler 250-50 mcg; Inhale 1 puff into the lungs 2 (two) times daily. Controller  Dispense: 180 each; Refill: 3  -     CBC auto differential; Future; Expected date: 09/09/2020  -     IgE; Future; Expected date: 09/09/2020    Family history of asthma  -     CBC auto differential; Future; Expected date: 09/09/2020  -     IgE; Future; Expected date: 09/09/2020    History of 2019 novel coronavirus disease (COVID-19)     Continue albuterol p.r.n.    Resume Advair 64757 mcg 1 puff twice a day.    Check PFT in 3 months.    Check CBC and IgE.    Yearly flu shot.    P PSV 23 next visit.          Follow up in about 3 months (around  12/9/2020).    This note was prepared using voice recognition system and is likely to have sound alike errors that may have been overlooked even after proof reading.  Please call me with any questions    Discussed diagnosis, its evaluation, treatment and usual course. All questions answered.    Thank you for the courtesy of participating in the care of this patient    Reji Alva MD

## 2020-09-12 LAB
C TRACH DNA SPEC QL NAA+PROBE: NOT DETECTED
N GONORRHOEA DNA SPEC QL NAA+PROBE: NOT DETECTED

## 2020-10-06 ENCOUNTER — OFFICE VISIT (OUTPATIENT)
Dept: DERMATOLOGY | Facility: CLINIC | Age: 30
End: 2020-10-06
Payer: COMMERCIAL

## 2020-10-06 DIAGNOSIS — L21.9 SEBORRHEIC DERMATITIS: Primary | ICD-10-CM

## 2020-10-06 PROCEDURE — 99213 PR OFFICE/OUTPT VISIT, EST, LEVL III, 20-29 MIN: ICD-10-PCS | Mod: 95,,, | Performed by: STUDENT IN AN ORGANIZED HEALTH CARE EDUCATION/TRAINING PROGRAM

## 2020-10-06 PROCEDURE — 99213 OFFICE O/P EST LOW 20 MIN: CPT | Mod: 95,,, | Performed by: STUDENT IN AN ORGANIZED HEALTH CARE EDUCATION/TRAINING PROGRAM

## 2020-10-06 RX ORDER — BETAMETHASONE VALERATE 1.2 MG/G
CREAM TOPICAL 2 TIMES DAILY
Qty: 45 G | Refills: 1 | Status: SHIPPED | OUTPATIENT
Start: 2020-10-06 | End: 2022-05-30

## 2020-10-06 RX ORDER — TRIAMCINOLONE ACETONIDE 0.25 MG/G
CREAM TOPICAL
Qty: 80 G | Refills: 1 | Status: SHIPPED | OUTPATIENT
Start: 2020-10-06 | End: 2022-05-30

## 2020-10-06 RX ORDER — KETOCONAZOLE 20 MG/ML
SHAMPOO, SUSPENSION TOPICAL
Qty: 120 ML | Refills: 6 | Status: SHIPPED | OUTPATIENT
Start: 2020-10-07 | End: 2021-12-29 | Stop reason: SDUPTHER

## 2020-10-06 NOTE — PROGRESS NOTES
The patient location is: home  The chief complaint leading to consultation is: follow-up rash on the neck, and new rash on the face    Visit type: audiovisual    Face to Face time with patient: 15mins  15 minutes of total time spent on the encounter, which includes face to face time and non-face to face time preparing to see the patient (eg, review of tests), Obtaining and/or reviewing separately obtained history, Documenting clinical information in the electronic or other health record, Independently interpreting results (not separately reported) and communicating results to the patient/family/caregiver, or Care coordination (not separately reported).         Each patient to whom he or she provides medical services by telemedicine is:  (1) informed of the relationship between the physician and patient and the respective role of any other health care provider with respect to management of the patient; and (2) notified that he or she may decline to receive medical services by telemedicine and may withdraw from such care at any time.    Notes: Patient present for follow-up of a rash on the neck, last seen on 1/3/20 where the rash was treated and resolved. She reports over the past several weeks, the rash has returned and not currently using anything for symptoms. She has also developed a rash on the face that she states is scaly and flaky, as well as itchy. Also not using anything for symptoms.     ROS: otherwise negative     PE: Const/Neuro - AAOx3, NAD          Skin - erythematous eczematous plaques on the back of the neck. Greasy flaking around the eyebrows and nose.     A/P: 1) Dermatitis - betamethasone 0.1% cream BID. Counseled patient on gentle skin care regimen, including need for sensitive soaps/detergents, as well as need for frequent use of sensitize moisturizers.                2) Facial dermatitis - ketoconazole 2% shampoo 3x weekly and TAC 0.025% cream BID.

## 2020-10-09 DIAGNOSIS — M25.561 RIGHT KNEE PAIN, UNSPECIFIED CHRONICITY: Primary | ICD-10-CM

## 2020-11-17 ENCOUNTER — OFFICE VISIT (OUTPATIENT)
Dept: INTERNAL MEDICINE | Facility: CLINIC | Age: 30
End: 2020-11-17
Payer: COMMERCIAL

## 2020-11-17 DIAGNOSIS — U07.1 COVID-19 VIRUS DETECTED: Primary | ICD-10-CM

## 2020-11-17 PROCEDURE — 99213 PR OFFICE/OUTPT VISIT, EST, LEVL III, 20-29 MIN: ICD-10-PCS | Mod: 95,,, | Performed by: FAMILY MEDICINE

## 2020-11-17 PROCEDURE — 99213 OFFICE O/P EST LOW 20 MIN: CPT | Mod: 95,,, | Performed by: FAMILY MEDICINE

## 2020-11-17 NOTE — PROGRESS NOTES
The patient location is: [place] (home)  The chief complaint leading to consultation is: COVID     Visit type: audiovisual    Face to Face time with patient: 11 minutes  11 minutes of total time spent on the encounter, which includes face to face time and non-face to face time preparing to see the patient (eg, review of tests), Obtaining and/or reviewing separately obtained history, Documenting clinical information in the electronic or other health record, Independently interpreting results (not separately reported) and communicating results to the patient/family/caregiver, or Care coordination (not separately reported).         Each patient to whom he or she provides medical services by telemedicine is:  (1) informed of the relationship between the physician and patient and the respective role of any other health care provider with respect to management of the patient; and (2) notified that he or she may decline to receive medical services by telemedicine and may withdraw from such care at any time.  Subjective:       Patient ID: Felicianoyiboris Love is a 29 y.o. female.    Chief Complaint: No chief complaint on file.    HPI Ms. 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     Review of Systems   Constitutional: Negative for activity change and unexpected weight change.   HENT: Negative for hearing loss, rhinorrhea and trouble swallowing.    Eyes: Negative for discharge and visual disturbance.   Respiratory: Negative for chest tightness and wheezing.    Cardiovascular: Negative for chest pain and palpitations.   Gastrointestinal: Negative for blood in stool, constipation, diarrhea and vomiting.   Endocrine: Negative for polydipsia and polyuria.    Genitourinary: Negative for difficulty urinating, dysuria, hematuria and menstrual problem.   Musculoskeletal: Negative for arthralgias, joint swelling and neck pain.   Neurological: Negative for weakness and headaches.   Psychiatric/Behavioral: Negative for confusion and dysphoric mood.           Past Medical History:   Diagnosis Date    Asthma     COVID-19 08/2020    Essential hypertension 4/27/2017    Right knee injury 07/27/2020    Staph aureus infection        Objective:        Physical Exam  Constitutional:       Appearance: Normal appearance.   HENT:      Head: Normocephalic and atraumatic.   Pulmonary:      Effort: Pulmonary effort is normal.   Neurological:      Mental Status: She is alert.   Psychiatric:         Mood and Affect: Mood normal.         Behavior: Behavior normal.           Results for orders placed or performed in visit on 08/21/20   C. trachomatis/N. gonorrhoeae by AMP DNA    Specimen: Genital   Result Value Ref Range    Chlamydia, Amplified DNA Not Detected Not Detected    N gonorrhoeae, amplified DNA Not Detected Not Detected   Liquid-Based Pap Smear, Screening   Result Value Ref Range    Final Pathologic Diagnosis       Specimen Adequacy  Satisfactory for interpretation. Endocervical component is present.  [place] Category  Negative for intraepithelial lesion or malignancy.  Shift in michele suggestive of bacterial vaginosis.      Disclaimer       The Pap smear is a screening test that aids in the detection of cervical  cancer and cancer precursors. Both false positive and false negative results  can occur. The test should be used at regular intervals, and positive results  should be confirmed before definitive therapy.  This liquid based specimen is processed using the  or  Thin PrepPAP  System. This specimen has been analyzed by the [employer] Imaging System  ([employer]), an automated imaging and review system which assists  the laboratory in evaluating cells on  ThinPrep PAP tests. Following automated  imaging, selected fields from every slide are reviewed by a cytotechnologist  and/or pathologist.         Assessment/Plan:     COVID-19 virus detected      Tested weekly  Last test positive   Quarantined 10 days   No symptoms         Follow up as needed     Stephanie Schwartz MD  [facility]

## 2020-11-25 ENCOUNTER — PATIENT OUTREACH (OUTPATIENT)
Dept: ADMINISTRATIVE | Facility: HOSPITAL | Age: 30
End: 2020-11-25

## 2020-11-25 ENCOUNTER — TELEPHONE (OUTPATIENT)
Dept: INTERNAL MEDICINE | Facility: CLINIC | Age: 30
End: 2020-11-25

## 2020-11-25 ENCOUNTER — CLINICAL SUPPORT (OUTPATIENT)
Dept: INTERNAL MEDICINE | Facility: CLINIC | Age: 30
End: 2020-11-25
Payer: COMMERCIAL

## 2020-11-25 VITALS — DIASTOLIC BLOOD PRESSURE: 98 MMHG | SYSTOLIC BLOOD PRESSURE: 138 MMHG | HEART RATE: 56 BPM

## 2020-11-25 PROCEDURE — 99999 PR PBB SHADOW E&M-EST. PATIENT-LVL II: ICD-10-PCS | Mod: PBBFAC,,,

## 2020-11-25 PROCEDURE — 99999 PR PBB SHADOW E&M-EST. PATIENT-LVL II: CPT | Mod: PBBFAC,,,

## 2020-11-25 NOTE — PROGRESS NOTES
HTN Report: Called Pt about BP, Pt does not have a BP cuff but willing to come in today for Nurse Visit to have BP checked. Appt.scheduled.

## 2020-11-25 NOTE — TELEPHONE ENCOUNTER
Pt here for bp check  Not on any medication at this time  bp 138/98 pulse 56  MD notified , advised pt to start bp medication  Spoke with pt and notified her of Dr. Schwartz recommendations  Pt refused and states she does not want to start any medication and says she thinks it is her birth control  States she will contact her GYN about changing medication because she is having other side effects from the medication also

## 2021-01-08 ENCOUNTER — PATIENT MESSAGE (OUTPATIENT)
Dept: OBSTETRICS AND GYNECOLOGY | Facility: CLINIC | Age: 31
End: 2021-01-08

## 2021-01-13 ENCOUNTER — PATIENT OUTREACH (OUTPATIENT)
Dept: ADMINISTRATIVE | Facility: HOSPITAL | Age: 31
End: 2021-01-13

## 2021-03-12 ENCOUNTER — OFFICE VISIT (OUTPATIENT)
Dept: INTERNAL MEDICINE | Facility: CLINIC | Age: 31
End: 2021-03-12
Payer: COMMERCIAL

## 2021-03-12 ENCOUNTER — PATIENT MESSAGE (OUTPATIENT)
Dept: INTERNAL MEDICINE | Facility: CLINIC | Age: 31
End: 2021-03-12

## 2021-03-12 ENCOUNTER — LAB VISIT (OUTPATIENT)
Dept: LAB | Facility: HOSPITAL | Age: 31
End: 2021-03-12
Attending: FAMILY MEDICINE
Payer: COMMERCIAL

## 2021-03-12 VITALS
OXYGEN SATURATION: 98 % | HEIGHT: 65 IN | DIASTOLIC BLOOD PRESSURE: 100 MMHG | BODY MASS INDEX: 28.28 KG/M2 | SYSTOLIC BLOOD PRESSURE: 134 MMHG | TEMPERATURE: 98 F | HEART RATE: 86 BPM | WEIGHT: 169.75 LBS

## 2021-03-12 DIAGNOSIS — Z00.00 ROUTINE PHYSICAL EXAMINATION: ICD-10-CM

## 2021-03-12 DIAGNOSIS — Z11.59 ENCOUNTER FOR HEPATITIS C SCREENING TEST FOR LOW RISK PATIENT: ICD-10-CM

## 2021-03-12 DIAGNOSIS — I15.9 SECONDARY HYPERTENSION: Primary | ICD-10-CM

## 2021-03-12 DIAGNOSIS — Z11.4 ENCOUNTER FOR SCREENING FOR HIV: ICD-10-CM

## 2021-03-12 DIAGNOSIS — I10 ESSENTIAL HYPERTENSION: ICD-10-CM

## 2021-03-12 DIAGNOSIS — I15.9 SECONDARY HYPERTENSION: ICD-10-CM

## 2021-03-12 DIAGNOSIS — E66.3 OVERWEIGHT: ICD-10-CM

## 2021-03-12 LAB
ALBUMIN SERPL BCP-MCNC: 3.9 G/DL (ref 3.5–5.2)
ALP SERPL-CCNC: 74 U/L (ref 55–135)
ALT SERPL W/O P-5'-P-CCNC: 13 U/L (ref 10–44)
ANION GAP SERPL CALC-SCNC: 6 MMOL/L (ref 8–16)
AST SERPL-CCNC: 14 U/L (ref 10–40)
BILIRUB SERPL-MCNC: 0.8 MG/DL (ref 0.1–1)
BILIRUB UR QL STRIP: NEGATIVE
BUN SERPL-MCNC: 10 MG/DL (ref 6–20)
CALCIUM SERPL-MCNC: 9.1 MG/DL (ref 8.7–10.5)
CHLORIDE SERPL-SCNC: 108 MMOL/L (ref 95–110)
CHOLEST SERPL-MCNC: 106 MG/DL (ref 120–199)
CHOLEST/HDLC SERPL: 2.5 {RATIO} (ref 2–5)
CLARITY UR: CLEAR
CO2 SERPL-SCNC: 26 MMOL/L (ref 23–29)
COLOR UR: YELLOW
CREAT SERPL-MCNC: 0.8 MG/DL (ref 0.5–1.4)
EST. GFR  (AFRICAN AMERICAN): >60 ML/MIN/1.73 M^2
EST. GFR  (NON AFRICAN AMERICAN): >60 ML/MIN/1.73 M^2
ESTIMATED AVG GLUCOSE: 88 MG/DL (ref 68–131)
GLUCOSE SERPL-MCNC: 102 MG/DL (ref 70–110)
GLUCOSE UR QL STRIP: NEGATIVE
HBA1C MFR BLD: 4.7 % (ref 4–5.6)
HDLC SERPL-MCNC: 42 MG/DL (ref 40–75)
HDLC SERPL: 39.6 % (ref 20–50)
HGB UR QL STRIP: NEGATIVE
KETONES UR QL STRIP: NEGATIVE
LDLC SERPL CALC-MCNC: 58 MG/DL (ref 63–159)
LEUKOCYTE ESTERASE UR QL STRIP: NEGATIVE
NITRITE UR QL STRIP: NEGATIVE
NONHDLC SERPL-MCNC: 64 MG/DL
PH UR STRIP: 8 [PH] (ref 5–8)
POTASSIUM SERPL-SCNC: 3.9 MMOL/L (ref 3.5–5.1)
POTASSIUM UR-SCNC: 92 MMOL/L (ref 15–95)
PROT SERPL-MCNC: 7.3 G/DL (ref 6–8.4)
PROT UR QL STRIP: NEGATIVE
PTH-INTACT SERPL-MCNC: 99 PG/ML (ref 9–77)
SODIUM SERPL-SCNC: 140 MMOL/L (ref 136–145)
SP GR UR STRIP: 1.01 (ref 1–1.03)
TRIGL SERPL-MCNC: 30 MG/DL (ref 30–150)
TSH SERPL DL<=0.005 MIU/L-ACNC: 2.04 UIU/ML (ref 0.4–4)
URN SPEC COLLECT METH UR: NORMAL

## 2021-03-12 PROCEDURE — 3080F PR MOST RECENT DIASTOLIC BLOOD PRESSURE >= 90 MM HG: ICD-10-PCS | Mod: CPTII,S$GLB,, | Performed by: FAMILY MEDICINE

## 2021-03-12 PROCEDURE — 99214 OFFICE O/P EST MOD 30 MIN: CPT | Mod: S$GLB,,, | Performed by: FAMILY MEDICINE

## 2021-03-12 PROCEDURE — 3080F DIAST BP >= 90 MM HG: CPT | Mod: CPTII,S$GLB,, | Performed by: FAMILY MEDICINE

## 2021-03-12 PROCEDURE — 36415 COLL VENOUS BLD VENIPUNCTURE: CPT | Performed by: FAMILY MEDICINE

## 2021-03-12 PROCEDURE — 86803 HEPATITIS C AB TEST: CPT | Performed by: FAMILY MEDICINE

## 2021-03-12 PROCEDURE — 3008F PR BODY MASS INDEX (BMI) DOCUMENTED: ICD-10-PCS | Mod: CPTII,S$GLB,, | Performed by: FAMILY MEDICINE

## 2021-03-12 PROCEDURE — 99999 PR PBB SHADOW E&M-EST. PATIENT-LVL III: ICD-10-PCS | Mod: PBBFAC,,, | Performed by: FAMILY MEDICINE

## 2021-03-12 PROCEDURE — 3075F SYST BP GE 130 - 139MM HG: CPT | Mod: CPTII,S$GLB,, | Performed by: FAMILY MEDICINE

## 2021-03-12 PROCEDURE — 84133 ASSAY OF URINE POTASSIUM: CPT | Performed by: FAMILY MEDICINE

## 2021-03-12 PROCEDURE — 80053 COMPREHEN METABOLIC PANEL: CPT | Performed by: FAMILY MEDICINE

## 2021-03-12 PROCEDURE — 99214 PR OFFICE/OUTPT VISIT, EST, LEVL IV, 30-39 MIN: ICD-10-PCS | Mod: S$GLB,,, | Performed by: FAMILY MEDICINE

## 2021-03-12 PROCEDURE — 84443 ASSAY THYROID STIM HORMONE: CPT | Performed by: FAMILY MEDICINE

## 2021-03-12 PROCEDURE — 3008F BODY MASS INDEX DOCD: CPT | Mod: CPTII,S$GLB,, | Performed by: FAMILY MEDICINE

## 2021-03-12 PROCEDURE — 86703 HIV-1/HIV-2 1 RESULT ANTBDY: CPT | Performed by: FAMILY MEDICINE

## 2021-03-12 PROCEDURE — 83036 HEMOGLOBIN GLYCOSYLATED A1C: CPT | Performed by: FAMILY MEDICINE

## 2021-03-12 PROCEDURE — 3075F PR MOST RECENT SYSTOLIC BLOOD PRESS GE 130-139MM HG: ICD-10-PCS | Mod: CPTII,S$GLB,, | Performed by: FAMILY MEDICINE

## 2021-03-12 PROCEDURE — 81002 URINALYSIS NONAUTO W/O SCOPE: CPT | Performed by: FAMILY MEDICINE

## 2021-03-12 PROCEDURE — 80061 LIPID PANEL: CPT | Performed by: FAMILY MEDICINE

## 2021-03-12 PROCEDURE — 99999 PR PBB SHADOW E&M-EST. PATIENT-LVL III: CPT | Mod: PBBFAC,,, | Performed by: FAMILY MEDICINE

## 2021-03-12 PROCEDURE — 83970 ASSAY OF PARATHORMONE: CPT | Performed by: FAMILY MEDICINE

## 2021-03-12 RX ORDER — AMLODIPINE BESYLATE 2.5 MG/1
2.5 TABLET ORAL DAILY
Qty: 90 TABLET | Refills: 0 | Status: SHIPPED | OUTPATIENT
Start: 2021-03-12 | End: 2021-12-29

## 2021-03-15 LAB
HCV AB SERPL QL IA: NEGATIVE
HIV 1+2 AB+HIV1 P24 AG SERPL QL IA: NEGATIVE

## 2021-03-29 ENCOUNTER — PATIENT MESSAGE (OUTPATIENT)
Dept: INTERNAL MEDICINE | Facility: CLINIC | Age: 31
End: 2021-03-29

## 2021-03-29 DIAGNOSIS — R79.89 ELEVATED PTHRP LEVEL: Primary | ICD-10-CM

## 2021-04-09 ENCOUNTER — OFFICE VISIT (OUTPATIENT)
Dept: INTERNAL MEDICINE | Facility: CLINIC | Age: 31
End: 2021-04-09
Payer: COMMERCIAL

## 2021-04-09 VITALS — SYSTOLIC BLOOD PRESSURE: 122 MMHG | WEIGHT: 160 LBS | BODY MASS INDEX: 26.63 KG/M2 | DIASTOLIC BLOOD PRESSURE: 89 MMHG

## 2021-04-09 DIAGNOSIS — I10 ESSENTIAL HYPERTENSION: Primary | ICD-10-CM

## 2021-04-09 DIAGNOSIS — R79.89 ELEVATED PTHRP LEVEL: ICD-10-CM

## 2021-04-09 PROCEDURE — 3008F PR BODY MASS INDEX (BMI) DOCUMENTED: ICD-10-PCS | Mod: CPTII,95,, | Performed by: FAMILY MEDICINE

## 2021-04-09 PROCEDURE — 99213 PR OFFICE/OUTPT VISIT, EST, LEVL III, 20-29 MIN: ICD-10-PCS | Mod: 95,,, | Performed by: FAMILY MEDICINE

## 2021-04-09 PROCEDURE — 99213 OFFICE O/P EST LOW 20 MIN: CPT | Mod: 95,,, | Performed by: FAMILY MEDICINE

## 2021-04-09 PROCEDURE — 3008F BODY MASS INDEX DOCD: CPT | Mod: CPTII,95,, | Performed by: FAMILY MEDICINE

## 2021-04-28 ENCOUNTER — PATIENT MESSAGE (OUTPATIENT)
Dept: RESEARCH | Facility: HOSPITAL | Age: 31
End: 2021-04-28

## 2021-12-29 ENCOUNTER — TELEPHONE (OUTPATIENT)
Dept: ADMINISTRATIVE | Facility: HOSPITAL | Age: 31
End: 2021-12-29

## 2021-12-29 ENCOUNTER — OFFICE VISIT (OUTPATIENT)
Dept: INTERNAL MEDICINE | Facility: CLINIC | Age: 31
End: 2021-12-29
Payer: COMMERCIAL

## 2021-12-29 DIAGNOSIS — R09.82 POSTNASAL DRIP: ICD-10-CM

## 2021-12-29 DIAGNOSIS — L21.9 SEBORRHEIC DERMATITIS: ICD-10-CM

## 2021-12-29 DIAGNOSIS — J32.9 RECURRENT SINUS INFECTIONS: Primary | ICD-10-CM

## 2021-12-29 DIAGNOSIS — J45.20 MILD INTERMITTENT ASTHMA WITHOUT COMPLICATION: ICD-10-CM

## 2021-12-29 PROCEDURE — 99213 PR OFFICE/OUTPT VISIT, EST, LEVL III, 20-29 MIN: ICD-10-PCS | Mod: 95,,, | Performed by: FAMILY MEDICINE

## 2021-12-29 PROCEDURE — 3044F HG A1C LEVEL LT 7.0%: CPT | Mod: CPTII,95,, | Performed by: FAMILY MEDICINE

## 2021-12-29 PROCEDURE — 3044F PR MOST RECENT HEMOGLOBIN A1C LEVEL <7.0%: ICD-10-PCS | Mod: CPTII,95,, | Performed by: FAMILY MEDICINE

## 2021-12-29 PROCEDURE — 99213 OFFICE O/P EST LOW 20 MIN: CPT | Mod: 95,,, | Performed by: FAMILY MEDICINE

## 2021-12-29 RX ORDER — KETOCONAZOLE 20 MG/ML
SHAMPOO, SUSPENSION TOPICAL
Qty: 120 ML | Refills: 6 | Status: SHIPPED | OUTPATIENT
Start: 2021-12-29 | End: 2022-05-30

## 2021-12-29 RX ORDER — MONTELUKAST SODIUM 10 MG/1
10 TABLET ORAL NIGHTLY
Qty: 90 TABLET | Refills: 1 | Status: SHIPPED | OUTPATIENT
Start: 2021-12-29 | End: 2022-01-28

## 2022-01-28 ENCOUNTER — PATIENT MESSAGE (OUTPATIENT)
Dept: PULMONOLOGY | Facility: CLINIC | Age: 32
End: 2022-01-28
Payer: COMMERCIAL

## 2022-01-28 DIAGNOSIS — J45.909 NOT WELL CONTROLLED ASTHMA WITHOUT COMPLICATION: ICD-10-CM

## 2022-01-28 RX ORDER — FLUTICASONE PROPIONATE AND SALMETEROL 250; 50 UG/1; UG/1
1 POWDER RESPIRATORY (INHALATION) 2 TIMES DAILY
Qty: 180 EACH | Refills: 3 | Status: CANCELLED | OUTPATIENT
Start: 2022-01-28 | End: 2023-01-28

## 2022-01-28 RX ORDER — FLUTICASONE PROPIONATE AND SALMETEROL 250; 50 UG/1; UG/1
1 POWDER RESPIRATORY (INHALATION) 2 TIMES DAILY
Qty: 180 EACH | Refills: 5 | Status: SHIPPED | OUTPATIENT
Start: 2022-01-28 | End: 2023-06-23 | Stop reason: SDUPTHER

## 2022-02-01 ENCOUNTER — HOSPITAL ENCOUNTER (EMERGENCY)
Facility: HOSPITAL | Age: 32
Discharge: HOME | End: 2022-02-01
Attending: EMERGENCY MEDICINE
Payer: COMMERCIAL

## 2022-02-01 VITALS
HEIGHT: 65 IN | RESPIRATION RATE: 12 BRPM | OXYGEN SATURATION: 100 % | BODY MASS INDEX: 32.1 KG/M2 | TEMPERATURE: 99 F | WEIGHT: 192.69 LBS | SYSTOLIC BLOOD PRESSURE: 173 MMHG | HEART RATE: 83 BPM | DIASTOLIC BLOOD PRESSURE: 90 MMHG

## 2022-02-01 DIAGNOSIS — R07.9 CHEST PAIN: ICD-10-CM

## 2022-02-01 PROCEDURE — 93010 ELECTROCARDIOGRAM REPORT: CPT | Mod: ,,, | Performed by: INTERNAL MEDICINE

## 2022-02-01 PROCEDURE — 99284 EMERGENCY DEPT VISIT MOD MDM: CPT | Mod: 25

## 2022-02-01 PROCEDURE — 93005 ELECTROCARDIOGRAM TRACING: CPT

## 2022-02-01 PROCEDURE — 93010 EKG 12-LEAD: ICD-10-PCS | Mod: ,,, | Performed by: INTERNAL MEDICINE

## 2022-02-01 NOTE — Clinical Note
"Smith Woodavid Love was seen and treated in our emergency department on 2/1/2022.  She may return to work on 02/02/2022.       If you have any questions or concerns, please don't hesitate to call.      Shannan Rodriguez MD"

## 2022-02-01 NOTE — ED PROVIDER NOTES
SCRIBE #1 NOTE: I, Antony Szymanski, am scribing for, and in the presence of, Shannan Rodriguez MD. I have scribed the entire note.       History     Chief Complaint   Patient presents with    See comments     Pt was asleep and felt a pull in her chest wall. Happened once for a few seconds and has not happened since. Happened between [id number]. States that this has happened before and was DX w/ inflamed cartilage. States think this is the same thing but wants eval to make sure.      Review of patient's allergies indicates:  No Known Allergies      History of Present Illness     HPI    2/1/2022, 3:29 AM  History obtained from the patient      History of Present Illness: Smith Love is a 31 y.o. female patient with a PMHx of asthma who presents to the Emergency Department for evaluation of chest tightness which onset suddenly around 2:00PM. Pt was sleeping when she woke up after feeling a pull on her chest that lasted about 1 minute. She was able to reproduce the tightness with shoulder movements, but does not feel any tightness here in the ED. Pt reports the same thing happening before and was diagnosed with inflamed cartilage. Symptoms are moderate in severity. No associated sxs. Patient denies any palpitations, fever, chills, cough, weakness, abdominal pain, and all other sxs at this time. No further complaints or concerns at this time.       Arrival mode: Personal vehicle    PCP: Stephanie Schwartz MD        Past Medical History:  Past Medical History:   Diagnosis Date    Asthma     COVID-19 08/2020    Essential hypertension 4/27/2017    Right knee injury 07/27/2020    Staph aureus infection        Past Surgical History:  No past surgical history on file.      Family History:  Family History   Problem Relation Age of Onset    Hypertension Maternal Grandmother     No Known Problems Mother     Hypertension Father        Social History:  Social History     Tobacco Use    Smoking status: Never Smoker     Smokeless tobacco: Never Used   Substance and Sexual Activity    Alcohol use: Yes     Comment: socially    Drug use: No    Sexual activity: Yes     Partners: Male     Birth control/protection: Condom, Pill        Review of Systems     Review of Systems   Constitutional: Negative for chills and fever.   HENT: Negative for sore throat.    Respiratory: Positive for chest tightness. Negative for cough and shortness of breath.    Cardiovascular: Negative for palpitations.   Gastrointestinal: Negative for abdominal pain and nausea.   Genitourinary: Negative for dysuria.   Musculoskeletal: Negative for back pain.   Skin: Negative for rash.   Neurological: Negative for weakness.   Hematological: Does not bruise/bleed easily.   All other systems reviewed and are negative.       Physical Exam     Initial Vitals [02/01/22 0308]   BP Pulse Resp Temp SpO2   (!) 173/90 83 12 98.8 °F (37.1 °C) 100 %      MAP       --          Physical Exam  Nursing Notes and Vital Signs Reviewed.  Constitutional: Patient is in no acute distress. Well-developed and well-nourished.  Head: Atraumatic. Normocephalic.  Eyes: PERRL. EOM intact. Conjunctivae are not pale. No scleral icterus.  ENT: Mucous membranes are moist. Oropharynx is clear and symmetric.    Neck: Supple. Full ROM. No lymphadenopathy.  Cardiovascular: Regular rate. Regular rhythm. No murmurs, rubs, or gallops. Distal pulses are 2+ and symmetric.  Pulmonary/Chest: No respiratory distress. Clear to auscultation bilaterally. Midsternal chest wall tenderness. No wheezing or rales.  Abdominal: Soft and non-distended.  There is no tenderness.  No rebound, guarding, or rigidity.   Genitourinary: No CVA tenderness  Musculoskeletal: Moves all extremities. No obvious deformities. No edema.   Skin: Warm and dry.  Neurological:  Alert, awake, and appropriate.  Normal speech.  No acute focal neurological deficits are appreciated.  Psychiatric: Normal affect. Good eye contact. Appropriate in  "content.     ED Course   Procedures  ED Vital Signs:  Vitals:    02/01/22 0308   BP: (!) 173/90   Pulse: 83   Resp: 12   Temp: 98.8 °F (37.1 °C)   TempSrc: Oral   SpO2: 100%   Weight: 87.4 kg (192 lb 10.9 oz)   Height: 5' 5" (1.651 m)         Imaging Results:  Imaging Results          X-Ray Chest PA And Lateral (In process)  Result time 02/01/22 04:06:28             4:09 AM: Per ED physician, pt's CXR results: No acute findings.    The EKG was ordered, reviewed, and independently interpreted by the ED provider.  Interpretation time: 3:46  Rate: 81 BPM  Rhythm: normal sinus rhythm with sinus arrhythmia  Interpretation: No ST or T wave abnormality. No STEMI.             The Emergency Provider reviewed the vital signs and test results, which are outlined above.     ED Discussion       4:12 AM: Reassessed pt at this time. Discussed with pt all pertinent ED information and results. Discussed pt dx and plan of tx. Gave pt all f/u and return to the ED instructions. All questions and concerns were addressed at this time. Pt expresses understanding of information and instructions, and is comfortable with plan to discharge. Pt is stable for discharge.    I discussed with patient and/or family/caretaker that evaluation in the ED does not suggest any emergent or life threatening medical conditions requiring immediate intervention beyond what was provided in the ED, and I believe patient is safe for discharge.  Regardless, an unremarkable evaluation in the ED does not preclude the development or presence of a serious of life threatening condition. As such, patient was instructed to return immediately for any worsening or change in current symptoms.         Medical Decision Making:   Clinical Tests:   Radiological Study: Ordered and Reviewed  Medical Tests: Ordered and Reviewed           ED Medication(s):  Medications - No data to display    Discharge Medication List as of 2/1/2022  4:12 AM           Follow-up Information     " Stephanie Schwartz MD In 2 days.    Specialty: Internal Medicine  Contact information:  [address] [facility]   [place] [address]  [phone]             [facility] - Emergency Dept..    Specialty: Emergency Medicine  Why: As needed, If symptoms worsen  Contact information:  [address] [facility] [address]  [facility] [address] [phone]                           Scribe Attestation:   Scribe #1: I performed the above scribed service and the documentation accurately describes the services I performed. I attest to the accuracy of the note.     Attending:   Physician Attestation Statement for Scribe #1: I, Shannan Rodriguez MD, personally performed the services described in this documentation, as scribed by Antony Szymanski, in my presence, and it is both accurate and complete.           Clinical Impression       ICD-10-CM ICD-9-CM   1. Chest pain  R07.9 786.50       Disposition:   Disposition: Discharged  Condition: Stable         Shannan Rodriguez MD  02/01/22 0554

## 2022-02-19 ENCOUNTER — PATIENT MESSAGE (OUTPATIENT)
Dept: INTERNAL MEDICINE | Facility: CLINIC | Age: 32
End: 2022-02-19
Payer: COMMERCIAL

## 2022-02-21 ENCOUNTER — PATIENT MESSAGE (OUTPATIENT)
Dept: INTERNAL MEDICINE | Facility: CLINIC | Age: 32
End: 2022-02-21
Payer: COMMERCIAL

## 2022-02-21 ENCOUNTER — OFFICE VISIT (OUTPATIENT)
Dept: INTERNAL MEDICINE | Facility: CLINIC | Age: 32
End: 2022-02-21
Payer: COMMERCIAL

## 2022-02-21 VITALS
OXYGEN SATURATION: 98 % | WEIGHT: 179.69 LBS | BODY MASS INDEX: 29.94 KG/M2 | HEIGHT: 65 IN | SYSTOLIC BLOOD PRESSURE: 152 MMHG | TEMPERATURE: 99 F | HEART RATE: 99 BPM | RESPIRATION RATE: 18 BRPM | DIASTOLIC BLOOD PRESSURE: 104 MMHG

## 2022-02-21 DIAGNOSIS — R07.89 CHEST WALL PAIN: ICD-10-CM

## 2022-02-21 DIAGNOSIS — I10 ESSENTIAL HYPERTENSION: Primary | ICD-10-CM

## 2022-02-21 PROCEDURE — 3080F DIAST BP >= 90 MM HG: CPT | Mod: CPTII,S$GLB,, | Performed by: PHYSICIAN ASSISTANT

## 2022-02-21 PROCEDURE — 3008F BODY MASS INDEX DOCD: CPT | Mod: CPTII,S$GLB,, | Performed by: PHYSICIAN ASSISTANT

## 2022-02-21 PROCEDURE — 99999 PR PBB SHADOW E&M-EST. PATIENT-LVL IV: ICD-10-PCS | Mod: PBBFAC,,, | Performed by: PHYSICIAN ASSISTANT

## 2022-02-21 PROCEDURE — 1160F RVW MEDS BY RX/DR IN RCRD: CPT | Mod: CPTII,S$GLB,, | Performed by: PHYSICIAN ASSISTANT

## 2022-02-21 PROCEDURE — 1160F PR REVIEW ALL MEDS BY PRESCRIBER/CLIN PHARMACIST DOCUMENTED: ICD-10-PCS | Mod: CPTII,S$GLB,, | Performed by: PHYSICIAN ASSISTANT

## 2022-02-21 PROCEDURE — 1159F PR MEDICATION LIST DOCUMENTED IN MEDICAL RECORD: ICD-10-PCS | Mod: CPTII,S$GLB,, | Performed by: PHYSICIAN ASSISTANT

## 2022-02-21 PROCEDURE — 99214 OFFICE O/P EST MOD 30 MIN: CPT | Mod: S$GLB,,, | Performed by: PHYSICIAN ASSISTANT

## 2022-02-21 PROCEDURE — 3080F PR MOST RECENT DIASTOLIC BLOOD PRESSURE >= 90 MM HG: ICD-10-PCS | Mod: CPTII,S$GLB,, | Performed by: PHYSICIAN ASSISTANT

## 2022-02-21 PROCEDURE — 3008F PR BODY MASS INDEX (BMI) DOCUMENTED: ICD-10-PCS | Mod: CPTII,S$GLB,, | Performed by: PHYSICIAN ASSISTANT

## 2022-02-21 PROCEDURE — 99999 PR PBB SHADOW E&M-EST. PATIENT-LVL IV: CPT | Mod: PBBFAC,,, | Performed by: PHYSICIAN ASSISTANT

## 2022-02-21 PROCEDURE — 1159F MED LIST DOCD IN RCRD: CPT | Mod: CPTII,S$GLB,, | Performed by: PHYSICIAN ASSISTANT

## 2022-02-21 PROCEDURE — 99214 PR OFFICE/OUTPT VISIT, EST, LEVL IV, 30-39 MIN: ICD-10-PCS | Mod: S$GLB,,, | Performed by: PHYSICIAN ASSISTANT

## 2022-02-21 PROCEDURE — 3077F SYST BP >= 140 MM HG: CPT | Mod: CPTII,S$GLB,, | Performed by: PHYSICIAN ASSISTANT

## 2022-02-21 PROCEDURE — 3077F PR MOST RECENT SYSTOLIC BLOOD PRESSURE >= 140 MM HG: ICD-10-PCS | Mod: CPTII,S$GLB,, | Performed by: PHYSICIAN ASSISTANT

## 2022-02-21 RX ORDER — DICLOFENAC SODIUM 10 MG/G
2 GEL TOPICAL 4 TIMES DAILY
Qty: 100 G | Refills: 0 | Status: SHIPPED | OUTPATIENT
Start: 2022-02-21 | End: 2022-09-16 | Stop reason: SDUPTHER

## 2022-02-21 RX ORDER — AMLODIPINE BESYLATE 5 MG/1
5 TABLET ORAL DAILY
Qty: 30 TABLET | Refills: 1 | Status: SHIPPED | OUTPATIENT
Start: 2022-02-21 | End: 2022-04-26 | Stop reason: SDUPTHER

## 2022-02-21 NOTE — PROGRESS NOTES
"Subjective:      Patient ID: Smith Love is a 31 y.o. female.    Chief Complaint: Chest Pain    Patient is new to me, being seen today for chest pain.     Evaluated in ER on 2/1 for chest pain, EKG and chest x-ray completed and negative.  Pain resolved until this morning when she was taking her lunch box off.  Worse with certain movements.  Pain is just lateral to R side for sternum.  Denies previous fall/trauma.  Does not notice any association with anxiety.        Similar episode when working out last year, evaluated in Urgent Care, dx costochondritis, recommended NSAID, pain resolved    Took Tylenol this morning     1mth ago went rock climbing and this started the few days after, worse with movement     PMH asthma, uses daily inhaler   HTN- not currently on medication, previously on amlodipine 2.5mg    Does not check BP at home, admits BP is always higher in office     Last visit Dec 2021 with Dr. Schwartz     Review of Systems   Constitutional: Negative for chills, diaphoresis and fever.   HENT: Positive for sinus pressure. Negative for congestion, rhinorrhea and sore throat.         Suffers with allergies, followed by ENT for recurrent sinus infections   Eyes: Negative for visual disturbance.   Respiratory: Negative for cough, shortness of breath and wheezing.    Cardiovascular: Positive for chest pain (MSK). Negative for leg swelling.   Gastrointestinal: Negative for abdominal pain, constipation, diarrhea, nausea and vomiting.   Skin: Negative for rash.   Neurological: Positive for headaches. Negative for dizziness, light-headedness and numbness.       Objective:   BP (!) 152/104   Pulse 99   Temp 99.4 °F (37.4 °C) (Tympanic)   Resp 18   Ht 5' 5" (1.651 m)   Wt 81.5 kg (179 lb 10.8 oz)   LMP 02/10/2022 (Exact Date)   SpO2 98%   BMI 29.90 kg/m²   Physical Exam  Constitutional:       General: She is not in acute distress.     Appearance: Normal appearance. She is well-developed. She is not " ill-appearing.   HENT:      Head: Normocephalic and atraumatic.   Eyes:      General: Lids are normal.      Pupils: Pupils are equal, round, and reactive to light.   Cardiovascular:      Rate and Rhythm: Normal rate and regular rhythm.      Heart sounds: Normal heart sounds. No murmur heard.  Pulmonary:      Effort: Pulmonary effort is normal. No respiratory distress.      Breath sounds: Normal breath sounds. No decreased breath sounds.   Chest:       Musculoskeletal:      Right lower leg: No edema.      Left lower leg: No edema.   Skin:     General: Skin is warm and dry.      Findings: No rash.   Neurological:      Mental Status: She is alert and oriented to person, place, and time.      Cranial Nerves: Cranial nerves are intact.      Sensory: No sensory deficit.      Motor: Motor function is intact.      Comments: CN 3,4 , 6, 7, 11 and 12 checked and intact     Psychiatric:         Mood and Affect: Mood is anxious.         Speech: Speech normal.         Behavior: Behavior normal.         Thought Content: Thought content normal.       Assessment:      1. Essential hypertension    2. Chest wall pain       Plan:   Essential hypertension  -     amLODIPine (NORVASC) 5 MG tablet; Take 1 tablet (5 mg total) by mouth once daily.  Dispense: 30 tablet; Refill: 1  -     CBC Auto Differential; Future; Expected date: 02/21/2022  -     Comprehensive Metabolic Panel; Future; Expected date: 02/21/2022  -     Lipid Panel; Future; Expected date: 02/21/2022    Chest wall pain  -     X-Ray Sternum; Future; Expected date: 02/21/2022  -     diclofenac sodium (VOLTAREN) 1 % Gel; Apply 2 g topically 4 (four) times daily. for 10 days  Dispense: 100 g; Refill: 0       Discussed RICE, topical NSAIDs, warm heat for chest wall pain     Suspect anxiety, allergy symptoms, HA all contributing to elevated BP reading in office   BP with intermittent elevated readings in the past, I do feel we should restart medication at this time, 5mg dose d/t  systolic >100  ED precautions given if symptoms worsen or do not improve     Monitor BP at home, keep log, bring cuff and log to f/u visit   2wk f/u BP    Fasting labs     Discussed worsening signs/symptoms and when to return to clinic or go to ED.   Patient expresses understanding and agrees with treatment plan.

## 2022-02-22 ENCOUNTER — PATIENT MESSAGE (OUTPATIENT)
Dept: INTERNAL MEDICINE | Facility: CLINIC | Age: 32
End: 2022-02-22
Payer: COMMERCIAL

## 2022-02-24 ENCOUNTER — PATIENT OUTREACH (OUTPATIENT)
Dept: ADMINISTRATIVE | Facility: HOSPITAL | Age: 32
End: 2022-02-24
Payer: COMMERCIAL

## 2022-02-24 NOTE — PROGRESS NOTES
HTN report: Patient notified to obtain a home BP reading. Patient states she did check her BP after her last appointment and it continued to be elevated. Patient states it is upsetting to her to take her BP and would rather wait for the new medications to start working and not worry about checking BP. Patient does have an appointment scheduled on 3/7/22 for follow up, advised to keep appointment and BP will be checked at that time.

## 2022-02-25 ENCOUNTER — PATIENT MESSAGE (OUTPATIENT)
Dept: INTERNAL MEDICINE | Facility: CLINIC | Age: 32
End: 2022-02-25
Payer: COMMERCIAL

## 2022-04-01 ENCOUNTER — TELEPHONE (OUTPATIENT)
Dept: URGENT CARE | Facility: CLINIC | Age: 32
End: 2022-04-01

## 2022-04-01 ENCOUNTER — OFFICE VISIT (OUTPATIENT)
Dept: URGENT CARE | Facility: CLINIC | Age: 32
End: 2022-04-01
Payer: COMMERCIAL

## 2022-04-01 VITALS
TEMPERATURE: 100 F | RESPIRATION RATE: 18 BRPM | HEIGHT: 65 IN | SYSTOLIC BLOOD PRESSURE: 154 MMHG | HEART RATE: 118 BPM | WEIGHT: 179 LBS | BODY MASS INDEX: 29.82 KG/M2 | DIASTOLIC BLOOD PRESSURE: 111 MMHG | OXYGEN SATURATION: 99 %

## 2022-04-01 DIAGNOSIS — J02.9 SORE THROAT: ICD-10-CM

## 2022-04-01 DIAGNOSIS — J06.9 UPPER RESPIRATORY TRACT INFECTION, UNSPECIFIED TYPE: Primary | ICD-10-CM

## 2022-04-01 DIAGNOSIS — Z32.00 ENCOUNTER FOR PREGNANCY TEST, RESULT UNKNOWN: ICD-10-CM

## 2022-04-01 DIAGNOSIS — J30.2 SEASONAL ALLERGIES: ICD-10-CM

## 2022-04-01 DIAGNOSIS — I10 ELEVATED BLOOD PRESSURE READING IN OFFICE WITH DIAGNOSIS OF HYPERTENSION: ICD-10-CM

## 2022-04-01 LAB
B-HCG UR QL: NEGATIVE
CTP QC/QA: YES
CTP QC/QA: YES
POC MOLECULAR INFLUENZA A AGN: NEGATIVE
POC MOLECULAR INFLUENZA B AGN: NEGATIVE

## 2022-04-01 PROCEDURE — 81025 URINE PREGNANCY TEST: CPT | Mod: S$GLB,,, | Performed by: PHYSICIAN ASSISTANT

## 2022-04-01 PROCEDURE — 1160F RVW MEDS BY RX/DR IN RCRD: CPT | Mod: CPTII,S$GLB,, | Performed by: PHYSICIAN ASSISTANT

## 2022-04-01 PROCEDURE — 3008F PR BODY MASS INDEX (BMI) DOCUMENTED: ICD-10-PCS | Mod: CPTII,S$GLB,, | Performed by: PHYSICIAN ASSISTANT

## 2022-04-01 PROCEDURE — 3077F SYST BP >= 140 MM HG: CPT | Mod: CPTII,S$GLB,, | Performed by: PHYSICIAN ASSISTANT

## 2022-04-01 PROCEDURE — 99214 PR OFFICE/OUTPT VISIT, EST, LEVL IV, 30-39 MIN: ICD-10-PCS | Mod: S$GLB,,, | Performed by: PHYSICIAN ASSISTANT

## 2022-04-01 PROCEDURE — 87502 INFLUENZA DNA AMP PROBE: CPT | Mod: QW,S$GLB,, | Performed by: PHYSICIAN ASSISTANT

## 2022-04-01 PROCEDURE — 99214 OFFICE O/P EST MOD 30 MIN: CPT | Mod: S$GLB,,, | Performed by: PHYSICIAN ASSISTANT

## 2022-04-01 PROCEDURE — 3080F DIAST BP >= 90 MM HG: CPT | Mod: CPTII,S$GLB,, | Performed by: PHYSICIAN ASSISTANT

## 2022-04-01 PROCEDURE — 3077F PR MOST RECENT SYSTOLIC BLOOD PRESSURE >= 140 MM HG: ICD-10-PCS | Mod: CPTII,S$GLB,, | Performed by: PHYSICIAN ASSISTANT

## 2022-04-01 PROCEDURE — 1160F PR REVIEW ALL MEDS BY PRESCRIBER/CLIN PHARMACIST DOCUMENTED: ICD-10-PCS | Mod: CPTII,S$GLB,, | Performed by: PHYSICIAN ASSISTANT

## 2022-04-01 PROCEDURE — 3080F PR MOST RECENT DIASTOLIC BLOOD PRESSURE >= 90 MM HG: ICD-10-PCS | Mod: CPTII,S$GLB,, | Performed by: PHYSICIAN ASSISTANT

## 2022-04-01 PROCEDURE — 1159F MED LIST DOCD IN RCRD: CPT | Mod: CPTII,S$GLB,, | Performed by: PHYSICIAN ASSISTANT

## 2022-04-01 PROCEDURE — 87502 POCT INFLUENZA A/B MOLECULAR: ICD-10-PCS | Mod: QW,S$GLB,, | Performed by: PHYSICIAN ASSISTANT

## 2022-04-01 PROCEDURE — 1159F PR MEDICATION LIST DOCUMENTED IN MEDICAL RECORD: ICD-10-PCS | Mod: CPTII,S$GLB,, | Performed by: PHYSICIAN ASSISTANT

## 2022-04-01 PROCEDURE — 81025 POCT URINE PREGNANCY: ICD-10-PCS | Mod: S$GLB,,, | Performed by: PHYSICIAN ASSISTANT

## 2022-04-01 PROCEDURE — 3008F BODY MASS INDEX DOCD: CPT | Mod: CPTII,S$GLB,, | Performed by: PHYSICIAN ASSISTANT

## 2022-04-01 RX ORDER — MONTELUKAST SODIUM 10 MG/1
10 TABLET ORAL NIGHTLY
Qty: 90 TABLET | Refills: 0 | Status: SHIPPED | OUTPATIENT
Start: 2022-04-01 | End: 2022-06-30

## 2022-04-01 NOTE — TELEPHONE ENCOUNTER
Returned pt's call regarding question about antibiotics. Discussed with pt that URI are usually viral in nature and given that her symptoms started yesterday and she has not tried any otc meds I do no believe she needs antibiotics at this time. Again discussed treatment plan  With flonase, singulair, and mucinex. F/u if symptoms significantly worsen or if no improvement after 7 days.

## 2022-04-01 NOTE — TELEPHONE ENCOUNTER
Pt called and stated that while looking over her paper work from her visit earlier today, she saw that it said upper respiratory infection. Pt would like to know if it's possible to get an antibiotic, maybe a Z-andra to knock this infection out. Pt also states that she doesn't think that she has Covid, but wants to know if the provider thinks maybe it could be a possibility.

## 2022-04-01 NOTE — PROGRESS NOTES
"Subjective:       Patient ID: Smith Love is a 31 y.o. female.    Vitals:  height is 5' 5" (1.651 m) and weight is 81.2 kg (179 lb). Her temperature is 99.5 °F (37.5 °C). Her blood pressure is 154/111 (abnormal) and her pulse is 118 (abnormal). Her respiration is 18 and oxygen saturation is 99%.     Chief Complaint: Nasal Congestion and Sore Throat    Patient present in office with nasal congestion and sore/scratchy throat. Yesterday her throat started itching and nasal congestion started today.  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 Denies any fever. Currently not taking any medications for symptoms. D/c her singular and flonase awhile ago. Pt also requesting UPT. LMP 3/15/22.    Sore Throat   This is a new problem. The current episode started today. The problem has been gradually worsening. The pain is worse on the right side. There has been no fever. The pain is at a severity of 0/10. The patient is experiencing no pain. Associated symptoms include congestion, a hoarse voice and a plugged ear sensation. Pertinent negatives include no abdominal pain, coughing, diarrhea, drooling, ear discharge, ear pain, headaches, neck pain, shortness of breath, stridor, swollen glands, trouble swallowing or vomiting. She has had no exposure to strep or mono. She has tried gargles for the symptoms. The treatment provided mild relief.       Constitution: Negative for chills, fatigue and fever.   HENT: Positive for congestion, postnasal drip and sore throat. Negative for ear pain, ear discharge, drooling and trouble swallowing.    Neck: Negative for neck pain.   Cardiovascular: Negative.    Respiratory: Negative for cough, shortness of breath and stridor.    Gastrointestinal: Negative for abdominal pain, vomiting and diarrhea.   Musculoskeletal: Negative for muscle cramps.   Allergic/Immunologic: Positive for seasonal allergies.   Neurological: Negative for headaches.       Objective:      Physical " Exam   Constitutional: She appears well-developed.  Non-toxic appearance. She does not appear ill. No distress.   HENT:   Head: Normocephalic and atraumatic.   Ears:   Right Ear: Tympanic membrane and external ear normal.   Left Ear: Tympanic membrane and external ear normal.   Nose: Congestion present.   Mouth/Throat: Uvula is midline. Mucous membranes are moist. Posterior oropharyngeal erythema present. No oropharyngeal exudate. No tonsillar exudate.   Eyes: Conjunctivae and EOM are normal.   Neck: Neck supple.   Cardiovascular: Normal rate and regular rhythm.   Pulmonary/Chest: Effort normal and breath sounds normal.   Abdominal: Normal appearance.   Musculoskeletal: Normal range of motion.         General: Normal range of motion.   Lymphadenopathy:     She has no cervical adenopathy.   Neurological: no focal deficit. She is alert. She displays no weakness. Gait normal.   Skin: Skin is warm, dry, not diaphoretic, not pale and no rash.   Psychiatric: Her behavior is normal. Her mood appears anxious.         Results for orders placed or performed in visit on 04/01/22   POCT Influenza A/B Molecular   Result Value Ref Range    POC Molecular Influenza A Ag Negative Negative, Not Reported    POC Molecular Influenza B Ag Negative Negative, Not Reported     Acceptable Yes    POCT urine pregnancy   Result Value Ref Range    POC Preg Test, Ur Negative Negative     Acceptable Yes        Assessment:       1. Upper respiratory tract infection, unspecified type    2. Sore throat    3. Seasonal allergies    4. Encounter for pregnancy test, result unknown    5. Elevated blood pressure reading in office with diagnosis of hypertension          Plan:       BP and HR elevated upon arrival and no change when rechecked at end of the visit. Values consistent with pt's vitals prior to being started on Amlodipine 5 mg. Pt states she does have a home bp cuff but does not check her BP at home because it makes  her anxious. Denies any caffeine or nicotine intake pta. Does report some stress. She was advised to monitor BP at home and f/u with PCP for possible alterations to htn meds if BP remains uncontrolled. Restart daily singulair and flonase for allergy symptoms. Supportive care for sore throat. F/u if no improvement.     Upper respiratory tract infection, unspecified type    Sore throat  -     POCT Influenza A/B Molecular    Seasonal allergies  -     montelukast (SINGULAIR) 10 mg tablet; Take 1 tablet (10 mg total) by mouth every evening.  Dispense: 90 tablet; Refill: 0    Encounter for pregnancy test, result unknown  -     POCT urine pregnancy    Elevated blood pressure reading in office with diagnosis of hypertension

## 2022-04-01 NOTE — LETTER
April 1, 2022      [facility]  [address]  [place] [address]  Phone: [phone]       Patient: Smith Love   YOB: 1990  Date of Visit: 04/01/2022    To Whom It May Concern:    Camila Love  was at [facility] on 04/01/2022. The patient may return to work/school on 4/4/22 with no restrictions. If you have any questions or concerns, or if I can be of further assistance, please do not hesitate to contact me.    Sincerely,    Sasha Burch PA-C

## 2022-04-04 ENCOUNTER — TELEPHONE (OUTPATIENT)
Dept: URGENT CARE | Facility: CLINIC | Age: 32
End: 2022-04-04
Payer: COMMERCIAL

## 2022-04-26 ENCOUNTER — PATIENT MESSAGE (OUTPATIENT)
Dept: INTERNAL MEDICINE | Facility: CLINIC | Age: 32
End: 2022-04-26
Payer: COMMERCIAL

## 2022-05-30 ENCOUNTER — PATIENT MESSAGE (OUTPATIENT)
Dept: INTERNAL MEDICINE | Facility: CLINIC | Age: 32
End: 2022-05-30

## 2022-05-30 ENCOUNTER — OFFICE VISIT (OUTPATIENT)
Dept: INTERNAL MEDICINE | Facility: CLINIC | Age: 32
End: 2022-05-30
Payer: COMMERCIAL

## 2022-05-30 VITALS
WEIGHT: 179.25 LBS | HEIGHT: 65 IN | DIASTOLIC BLOOD PRESSURE: 90 MMHG | OXYGEN SATURATION: 98 % | SYSTOLIC BLOOD PRESSURE: 140 MMHG | TEMPERATURE: 98 F | HEART RATE: 70 BPM | BODY MASS INDEX: 29.87 KG/M2 | RESPIRATION RATE: 18 BRPM

## 2022-05-30 DIAGNOSIS — I10 ESSENTIAL HYPERTENSION: ICD-10-CM

## 2022-05-30 DIAGNOSIS — F43.9 STRESS: ICD-10-CM

## 2022-05-30 DIAGNOSIS — Z00.00 ROUTINE PHYSICAL EXAMINATION: Primary | ICD-10-CM

## 2022-05-30 PROCEDURE — 3080F PR MOST RECENT DIASTOLIC BLOOD PRESSURE >= 90 MM HG: ICD-10-PCS | Mod: CPTII,S$GLB,, | Performed by: FAMILY MEDICINE

## 2022-05-30 PROCEDURE — 99999 PR PBB SHADOW E&M-EST. PATIENT-LVL IV: ICD-10-PCS | Mod: PBBFAC,,, | Performed by: FAMILY MEDICINE

## 2022-05-30 PROCEDURE — 3008F BODY MASS INDEX DOCD: CPT | Mod: CPTII,S$GLB,, | Performed by: FAMILY MEDICINE

## 2022-05-30 PROCEDURE — 99395 PREV VISIT EST AGE 18-39: CPT | Mod: S$GLB,,, | Performed by: FAMILY MEDICINE

## 2022-05-30 PROCEDURE — 3077F SYST BP >= 140 MM HG: CPT | Mod: CPTII,S$GLB,, | Performed by: FAMILY MEDICINE

## 2022-05-30 PROCEDURE — 1159F MED LIST DOCD IN RCRD: CPT | Mod: CPTII,S$GLB,, | Performed by: FAMILY MEDICINE

## 2022-05-30 PROCEDURE — 3077F PR MOST RECENT SYSTOLIC BLOOD PRESSURE >= 140 MM HG: ICD-10-PCS | Mod: CPTII,S$GLB,, | Performed by: FAMILY MEDICINE

## 2022-05-30 PROCEDURE — 3080F DIAST BP >= 90 MM HG: CPT | Mod: CPTII,S$GLB,, | Performed by: FAMILY MEDICINE

## 2022-05-30 PROCEDURE — 1159F PR MEDICATION LIST DOCUMENTED IN MEDICAL RECORD: ICD-10-PCS | Mod: CPTII,S$GLB,, | Performed by: FAMILY MEDICINE

## 2022-05-30 PROCEDURE — 99999 PR PBB SHADOW E&M-EST. PATIENT-LVL IV: CPT | Mod: PBBFAC,,, | Performed by: FAMILY MEDICINE

## 2022-05-30 PROCEDURE — 1160F PR REVIEW ALL MEDS BY PRESCRIBER/CLIN PHARMACIST DOCUMENTED: ICD-10-PCS | Mod: CPTII,S$GLB,, | Performed by: FAMILY MEDICINE

## 2022-05-30 PROCEDURE — 3008F PR BODY MASS INDEX (BMI) DOCUMENTED: ICD-10-PCS | Mod: CPTII,S$GLB,, | Performed by: FAMILY MEDICINE

## 2022-05-30 PROCEDURE — 1160F RVW MEDS BY RX/DR IN RCRD: CPT | Mod: CPTII,S$GLB,, | Performed by: FAMILY MEDICINE

## 2022-05-30 PROCEDURE — 99395 PR PREVENTIVE VISIT,EST,18-39: ICD-10-PCS | Mod: S$GLB,,, | Performed by: FAMILY MEDICINE

## 2022-05-30 RX ORDER — BUSPIRONE HYDROCHLORIDE 7.5 MG/1
7.5 TABLET ORAL 3 TIMES DAILY PRN
Qty: 90 TABLET | Refills: 0 | Status: SHIPPED | OUTPATIENT
Start: 2022-05-30 | End: 2023-03-03

## 2022-05-30 RX ORDER — AMLODIPINE BESYLATE 5 MG/1
5 TABLET ORAL DAILY
Qty: 30 TABLET | Refills: 0 | Status: SHIPPED | OUTPATIENT
Start: 2022-05-30 | End: 2022-06-29 | Stop reason: SDUPTHER

## 2022-05-30 NOTE — PROGRESS NOTES
Smith Love  05/30/2022  [id number]    Stephanie Schwartz MD  Patient Care Team:  Stephanie Schwartz MD as PCP - General (Internal Medicine)  Arlet Mcclelland MD as PCP - Family Medicine (Family Medicine)    Has the patient seen any provider outside of the network since the last visit ? (no). If yes, HIPPA forms completed and records requested.      Visit Type:a scheduled routine follow-up visit    Chief Complaint:  Chief Complaint   Patient presents with    Annual Exam       History of Present Illness:  HPI Ms Love presents today for her annual exam.    She has been seen recently for elevated blood pressure   Was on 2.5 mg amlodipine and did not see a change  Increased to 5 mg     She ran out on Friday   She was getting upset because she wasn't seeing a change     She has noted weight gain    Worked on calming herself and meditating so that her blood pressure would be normal today.     Review of Systems   Constitutional: Negative for chills and fever.   HENT: Negative for congestion and tinnitus.    Eyes: Negative for blurred vision, pain and discharge.   Respiratory: Negative for cough and wheezing.    Cardiovascular: Negative for chest pain, palpitations, orthopnea and leg swelling.   Gastrointestinal: Negative for abdominal pain, blood in stool, constipation, diarrhea, heartburn, nausea and vomiting.   Genitourinary: Negative for dysuria, flank pain, frequency, hematuria and urgency.   Skin: Negative for itching and rash.   Neurological: Negative for dizziness, tingling and headaches.   Psychiatric/Behavioral: Negative for depression.         Screening Questionnaires:    In the last two weeks how often have you felt down, depressed, or hopeless ( no )    In the last two weeks how often have you had little interest or pleasure in doing  (no )    In the last two weeks how often have you been bothered by the following problems:  1. Feeling nervous, anxious, or on edge ( intermittent )    2. Not being able to  stop or control worrying ( no)    3. Worrying too much about different things ( intermittent)    4. Trouble relaxing ( intermittent )    5. Being so restless that it is hard to sit still  (no )    6. Becoming easily annoyed or irritable (intermittent)    7. Feeling afraid as if something awful might happen (intermittent )    How often do you have a drink containing Alcohol? occasional, social use     Do you exercise  (yes ) very active    Do you take a baby Aspirin daily ( no)    Do you have an advance directive ( no ) The patient was given information regarding Living Will/Durable Power-of- if requested.     The following were reviewed: Active problem list, medication list, allergies, family history, social history, and Health Maintenance.     History:  Past Medical History:   Diagnosis Date    Asthma     COVID-19 08/2020    Essential hypertension 4/27/2017    Right knee injury 07/27/2020    Staph aureus infection      No past surgical history on file.  Family History   Problem Relation Age of Onset    Hypertension Maternal Grandmother     No Known Problems Mother     Hypertension Father      Social History     Socioeconomic History    Marital status: Single   Tobacco Use    Smoking status: Never Smoker    Smokeless tobacco: Never Used   Substance and Sexual Activity    Alcohol use: Yes     Comment: socially    Drug use: No    Sexual activity: Yes     Partners: Male     Birth control/protection: Condom, Pill     Social Determinants of Health     Financial Resource Strain: Low Risk     Difficulty of Paying Living Expenses: Not hard at all   Food Insecurity: No Food Insecurity    Worried About Running Out of Food in the Last Year: Never true    Ran Out of Food in the Last Year: Never true   Transportation Needs: No Transportation Needs    Lack of Transportation (Medical): No    Lack of Transportation (Non-Medical): No   Physical Activity: Insufficiently Active    Days of Exercise per Week:  3 days    Minutes of Exercise per Session: 30 min   Stress: No Stress Concern Present    Feeling of Stress : Only a little   Social Connections: Unknown    Frequency of Communication with Friends and Family: More than three times a week    Frequency of Social Gatherings with Friends and Family: Never    Active Member of Clubs or Organizations: No    Attends Club or Organization Meetings: Never    Marital Status: Living with partner   Housing Stability: Low Risk     Unable to Pay for Housing in the Last Year: No    Number of Places Lived in the Last Year: 1    Unstable Housing in the Last Year: No     Patient Active Problem List   Diagnosis    Asthma, mild intermittent    Essential hypertension    Seasonal allergic rhinitis due to pollen     Review of patient's allergies indicates:  No Known Allergies    Health Maintenance  Health Maintenance Topics with due status: Not Due       Topic Last Completion Date    Cervical Cancer Screening 08/21/2020    Influenza Vaccine Not Due     Health Maintenance Due   Topic Date Due    TETANUS VACCINE  09/24/2014    COVID-19 Vaccine (3 - Booster for Pfizer series) 03/22/2022       Medications:  Current Outpatient Medications on File Prior to Visit   Medication Sig Dispense Refill    fluticasone-salmeterol diskus inhaler 250-50 mcg Inhale 1 puff into the lungs 2 (two) times daily. Controller 180 each 5    montelukast (SINGULAIR) 10 mg tablet Take 1 tablet (10 mg total) by mouth every evening. 90 tablet 0    [DISCONTINUED] amLODIPine (NORVASC) 5 MG tablet Take 1 tablet (5 mg total) by mouth once daily. 30 tablet 0    [DISCONTINUED] triamcinolone acetonide 0.025% (KENALOG) 0.025 % cream AAA bid. Apply to the face. 80 g 1    diclofenac sodium (VOLTAREN) 1 % Gel Apply 2 g topically 4 (four) times daily. for 10 days 100 g 0    [DISCONTINUED] betamethasone valerate 0.1% (VALISONE) 0.1 % Crea Apply topically 2 (two) times daily. Do NOT Apply to the face. (Patient not  taking: No sig reported) 45 g 1    [DISCONTINUED] ketoconazole (NIZORAL) 2 % shampoo Apply topically 3 (three) times a week. (Patient not taking: Reported on 5/30/2022) 120 mL 6    [DISCONTINUED] norethindrone (MICRONOR) 0.35 mg tablet Take 1 tablet (0.35 mg total) by mouth once daily. (Patient not taking: No sig reported) 28 tablet 12     No current facility-administered medications on file prior to visit.       Medications have been reviewed and reconciled with patient at visit today.    Barriers to medications present (no )    Adverse reactions to current medications (no)    Over the counter medications reviewed (Yes) and if needed added to active Medication list.    Exam:  Vitals:    05/30/22 1608   BP: (!) 140/90   Pulse:    Resp:    Temp:      Weight: 81.3 kg (179 lb 3.7 oz)   Body mass index is 29.83 kg/m².      Physical Exam  Vitals reviewed.   Constitutional:       General: She is not in acute distress.  HENT:      Head: Normocephalic and atraumatic.      Right Ear: External ear normal.      Left Ear: External ear normal.      Nose: Nose normal.   Eyes:      General:         Right eye: No discharge.         Left eye: No discharge.      Pupils: Pupils are equal, round, and reactive to light.   Neck:      Thyroid: No thyromegaly.   Cardiovascular:      Rate and Rhythm: Normal rate and regular rhythm.      Heart sounds: Normal heart sounds. No murmur heard.  Pulmonary:      Effort: Pulmonary effort is normal. No respiratory distress.      Breath sounds: Normal breath sounds. No wheezing.   Abdominal:      General: Bowel sounds are normal. There is no distension.      Palpations: Abdomen is soft.      Tenderness: There is no abdominal tenderness.   Musculoskeletal:         General: Normal range of motion.      Cervical back: Normal range of motion and neck supple.   Skin:     General: Skin is warm and dry.      Findings: No rash.   Neurological:      Mental Status: She is alert and oriented to person, place,  and time.      Coordination: Coordination normal.   Psychiatric:         Behavior: Behavior normal.         Laboratory Reviewed: (Yes)  Lab Results   Component Value Date    WBC 6.30 09/11/2015    HGB 14.4 09/11/2015    HCT 41.3 09/11/2015     09/11/2015    CHOL 106 (L) 03/12/2021    TRIG 30 03/12/2021    HDL 42 03/12/2021    ALT 13 03/12/2021    AST 14 03/12/2021     03/12/2021    K 3.9 03/12/2021     03/12/2021    CREATININE 0.8 03/12/2021    BUN 10 03/12/2021    CO2 26 03/12/2021    TSH 2.035 03/12/2021    GLUF 148 (H) 08/29/2018    HGBA1C 4.7 03/12/2021       Assessment:  The primary encounter diagnosis was Routine physical examination. Diagnoses of Essential hypertension and Stress were also pertinent to this visit.    Plan:  Routine physical examination    Essential hypertension  -     amLODIPine (NORVASC) 5 MG tablet; Take 1 tablet (5 mg total) by mouth once daily.  Dispense: 30 tablet; Refill: 0    Stress  -     busPIRone (BUSPAR) 7.5 MG tablet; Take 1 tablet (7.5 mg total) by mouth 3 (three) times daily as needed (anxiety/stress).  Dispense: 90 tablet; Refill: 0      -Patient's lab results were reviewed and discussed with patient  -Treatment options and alternatives were discussed with the patient. Patient expressed understanding. Patient was given the opportunity to ask questions and be an active participant in their medical care. Patient had no further questions or concerns at this time.   -Documentation of patient's health and condition was obtained from family member who was present during visit.  -Patient is an overall moderate risk for health complications from their medical conditions.     Follow up: follow up 2 week BP check       Care Plan/Goals: Reviewed N/A   Goals    None             After visit summary printed and given to patient upon discharge.  Patient goals and care plan are included in After visit summary.

## 2022-06-28 ENCOUNTER — PATIENT MESSAGE (OUTPATIENT)
Dept: INTERNAL MEDICINE | Facility: CLINIC | Age: 32
End: 2022-06-28
Payer: COMMERCIAL

## 2022-06-28 ENCOUNTER — TELEPHONE (OUTPATIENT)
Dept: INTERNAL MEDICINE | Facility: CLINIC | Age: 32
End: 2022-06-28
Payer: COMMERCIAL

## 2022-06-28 DIAGNOSIS — I10 ESSENTIAL HYPERTENSION: ICD-10-CM

## 2022-06-28 NOTE — TELEPHONE ENCOUNTER
Attempted to contact pt to get a normal blood pressure reading   No answer message left   Message sent on portal

## 2022-06-29 ENCOUNTER — PATIENT MESSAGE (OUTPATIENT)
Dept: INTERNAL MEDICINE | Facility: CLINIC | Age: 32
End: 2022-06-29
Payer: COMMERCIAL

## 2022-06-29 RX ORDER — AMLODIPINE BESYLATE 5 MG/1
5 TABLET ORAL DAILY
Qty: 30 TABLET | Refills: 5 | Status: SHIPPED | OUTPATIENT
Start: 2022-06-29 | End: 2022-12-27 | Stop reason: SDUPTHER

## 2022-06-30 ENCOUNTER — CLINICAL SUPPORT (OUTPATIENT)
Dept: INTERNAL MEDICINE | Facility: CLINIC | Age: 32
End: 2022-06-30
Payer: COMMERCIAL

## 2022-06-30 VITALS — HEART RATE: 104 BPM | SYSTOLIC BLOOD PRESSURE: 132 MMHG | DIASTOLIC BLOOD PRESSURE: 88 MMHG

## 2022-06-30 PROCEDURE — 99999 PR PBB SHADOW E&M-EST. PATIENT-LVL II: ICD-10-PCS | Mod: PBBFAC,,,

## 2022-06-30 PROCEDURE — 99999 PR PBB SHADOW E&M-EST. PATIENT-LVL II: CPT | Mod: PBBFAC,,,

## 2022-06-30 NOTE — PROGRESS NOTES
Pt here for bp check   bp 132/88 pulse 104  No acute distress noted  Pt states she will also start taking her anxiety medication that was ordered to help keep  Her calm and start taking her bp at home

## 2022-11-15 ENCOUNTER — PATIENT MESSAGE (OUTPATIENT)
Dept: INTERNAL MEDICINE | Facility: CLINIC | Age: 32
End: 2022-11-15
Payer: COMMERCIAL

## 2022-11-15 RX ORDER — PROMETHAZINE HYDROCHLORIDE AND DEXTROMETHORPHAN HYDROBROMIDE 6.25; 15 MG/5ML; MG/5ML
10 SYRUP ORAL EVERY 6 HOURS PRN
Qty: 240 ML | Refills: 0 | Status: SHIPPED | OUTPATIENT
Start: 2022-11-15 | End: 2022-11-25

## 2022-11-29 ENCOUNTER — OFFICE VISIT (OUTPATIENT)
Dept: URGENT CARE | Facility: CLINIC | Age: 32
End: 2022-11-29
Payer: COMMERCIAL

## 2022-11-29 ENCOUNTER — HOSPITAL ENCOUNTER (OUTPATIENT)
Dept: RADIOLOGY | Facility: CLINIC | Age: 32
Discharge: HOME | End: 2022-11-29
Attending: NURSE PRACTITIONER
Payer: COMMERCIAL

## 2022-11-29 VITALS
RESPIRATION RATE: 12 BRPM | SYSTOLIC BLOOD PRESSURE: 136 MMHG | HEIGHT: 65 IN | HEART RATE: 70 BPM | DIASTOLIC BLOOD PRESSURE: 95 MMHG | WEIGHT: 175 LBS | OXYGEN SATURATION: 100 % | BODY MASS INDEX: 29.16 KG/M2 | TEMPERATURE: 98 F

## 2022-11-29 DIAGNOSIS — S99.922A INJURY OF LEFT FOOT, INITIAL ENCOUNTER: Primary | ICD-10-CM

## 2022-11-29 PROCEDURE — 73630 X-RAY EXAM OF FOOT: CPT | Mod: LT,S$GLB,, | Performed by: RADIOLOGY

## 2022-11-29 PROCEDURE — 73610 X-RAY EXAM OF ANKLE: CPT | Mod: LT,S$GLB,, | Performed by: RADIOLOGY

## 2022-11-29 PROCEDURE — 3008F PR BODY MASS INDEX (BMI) DOCUMENTED: ICD-10-PCS | Mod: CPTII,S$GLB,, | Performed by: NURSE PRACTITIONER

## 2022-11-29 PROCEDURE — 73610 XR ANKLE COMPLETE 3 VIEW LEFT: ICD-10-PCS | Mod: LT,S$GLB,, | Performed by: RADIOLOGY

## 2022-11-29 PROCEDURE — 99213 PR OFFICE/OUTPT VISIT, EST, LEVL III, 20-29 MIN: ICD-10-PCS | Mod: S$GLB,,, | Performed by: NURSE PRACTITIONER

## 2022-11-29 PROCEDURE — 1159F MED LIST DOCD IN RCRD: CPT | Mod: CPTII,S$GLB,, | Performed by: NURSE PRACTITIONER

## 2022-11-29 PROCEDURE — 3080F PR MOST RECENT DIASTOLIC BLOOD PRESSURE >= 90 MM HG: ICD-10-PCS | Mod: CPTII,S$GLB,, | Performed by: NURSE PRACTITIONER

## 2022-11-29 PROCEDURE — 3075F PR MOST RECENT SYSTOLIC BLOOD PRESS GE 130-139MM HG: ICD-10-PCS | Mod: CPTII,S$GLB,, | Performed by: NURSE PRACTITIONER

## 2022-11-29 PROCEDURE — 1159F PR MEDICATION LIST DOCUMENTED IN MEDICAL RECORD: ICD-10-PCS | Mod: CPTII,S$GLB,, | Performed by: NURSE PRACTITIONER

## 2022-11-29 PROCEDURE — 73630 XR FOOT COMPLETE 3 VIEW LEFT: ICD-10-PCS | Mod: LT,S$GLB,, | Performed by: RADIOLOGY

## 2022-11-29 PROCEDURE — 99213 OFFICE O/P EST LOW 20 MIN: CPT | Mod: S$GLB,,, | Performed by: NURSE PRACTITIONER

## 2022-11-29 PROCEDURE — 3008F BODY MASS INDEX DOCD: CPT | Mod: CPTII,S$GLB,, | Performed by: NURSE PRACTITIONER

## 2022-11-29 PROCEDURE — 3080F DIAST BP >= 90 MM HG: CPT | Mod: CPTII,S$GLB,, | Performed by: NURSE PRACTITIONER

## 2022-11-29 PROCEDURE — 3075F SYST BP GE 130 - 139MM HG: CPT | Mod: CPTII,S$GLB,, | Performed by: NURSE PRACTITIONER

## 2022-11-29 NOTE — PROGRESS NOTES
"Subjective:       Patient ID: Smith Love is a 32 y.o. female.    Vitals:  height is 5' 5" (1.651 m) and weight is 79.4 kg (175 lb). Her oral temperature is 98.1 °F (36.7 °C). Her blood pressure is 136/95 (abnormal) and her pulse is 70. Her respiration is 12 and oxygen saturation is 100%.     Chief Complaint: Foot Injury    32 yr old female presents to the Urgent Care with complaint of left foot injury s/p mechanical fall x 2 weeks ago. Patient tripped over her dog. Patient denies hitting head or any LOC.     Foot Injury   The incident occurred more than 1 week ago (November 15). The incident occurred at home. The injury mechanism was a twisting injury. The pain is present in the left foot. The quality of the pain is described as aching. The pain is at a severity of 5/10. The pain is mild. The pain has been Constant since onset. Associated symptoms include an inability to bear weight and a loss of motion. Pertinent negatives include no loss of sensation, muscle weakness, numbness or tingling. She reports no foreign bodies present. The symptoms are aggravated by movement, weight bearing and palpation. Treatments tried: Epson Salt. The treatment provided no relief.     Constitution: Negative for chills, sweating, fatigue and fever.   Musculoskeletal:  Positive for pain, joint pain and joint swelling.   Neurological:  Negative for altered mental status and numbness.   Psychiatric/Behavioral:  Negative for altered mental status.      Objective:      Physical Exam   Constitutional: She is oriented to person, place, and time. She appears well-developed. She is cooperative.  Non-toxic appearance. She does not appear ill. No distress.   HENT:   Head: Atraumatic.   Neck: Neck supple.   Cardiovascular:   Pulses:       Dorsalis pedis pulses are 2+ on the right side and 2+ on the left side.        Posterior tibial pulses are 2+ on the right side and 2+ on the left side.   Pulmonary/Chest: Effort normal and breath " sounds normal.   Musculoskeletal:      Left ankle: No head of 5th metatarsal tenderness found.        Legs:       Left foot: Normal range of motion and normal capillary refill. Bony tenderness and swelling (mild) present. No tenderness or crepitus.   Neurological: She is alert and oriented to person, place, and time. She has normal strength. Coordination and gait normal.   Skin: Skin is warm, dry and intact. Capillary refill takes less than 2 seconds.   Psychiatric: Her speech is normal and behavior is normal. Judgment and thought content normal.   Nursing note and vitals reviewed.      Assessment:       1. Injury of left foot, initial encounter      FINDINGS:  There is no acute fracture or dislocation.  No significant soft tissue swelling.     The tibiotalar articulation is intact.  The visualized tarsal bones are normal in appearance.  Normal tarsometatarsal alignment.  Distal joint spaces are preserved.     No radiopaque foreign body.     Impression:     No acute radiographic findings of the ankle and foot.    Plan:         Injury of left foot, initial encounter  -     XR FOOT COMPLETE 3 VIEW LEFT; Future; Expected date: 11/29/2022  -     XR ANKLE COMPLETE 3 VIEW LEFT; Future; Expected date: 11/29/2022       Patient Instructions   X ray showed:  FINDINGS:  There is no acute fracture or dislocation.  No significant soft tissue swelling.     The tibiotalar articulation is intact.  The visualized tarsal bones are normal in appearance.  Normal tarsometatarsal alignment.  Distal joint spaces are preserved.     No radiopaque foreign body.     Impression:     No acute radiographic findings of the ankle and foot.    If you were prescribed a narcotic or controlled medication, do not drive or operate heavy equipment or machinery while taking these medications.  You must understand that you've received an Urgent Care treatment only and that you may be released before all your medical problems are known or treated. You, the  patient, will arrange for follow up care as instructed.  Follow up with your PCP or specialty clinic as directed within 2-5 days if not improved or as needed.  You can call [phone] to schedule an appointment with the appropriate provider.  If your condition worsens we recommend that you receive another evaluation at the emergency room immediately or contact your primary medical clinics after hours call service to discuss your concerns.  Please return here or go to the Emergency Department for any concerns or worsening of condition.

## 2022-11-29 NOTE — PATIENT INSTRUCTIONS
X ray showed:  FINDINGS:  There is no acute fracture or dislocation.  No significant soft tissue swelling.     The tibiotalar articulation is intact.  The visualized tarsal bones are normal in appearance.  Normal tarsometatarsal alignment.  Distal joint spaces are preserved.     No radiopaque foreign body.     Impression:     No acute radiographic findings of the ankle and foot.    If you were prescribed a narcotic or controlled medication, do not drive or operate heavy equipment or machinery while taking these medications.  You must understand that you've received an Urgent Care treatment only and that you may be released before all your medical problems are known or treated. You, the patient, will arrange for follow up care as instructed.  Follow up with your PCP or specialty clinic as directed within 2-5 days if not improved or as needed.  You can call [phone] to schedule an appointment with the appropriate provider.  If your condition worsens we recommend that you receive another evaluation at the emergency room immediately or contact your primary medical clinics after hours call service to discuss your concerns.  Please return here or go to the Emergency Department for any concerns or worsening of condition.

## 2022-12-02 ENCOUNTER — TELEPHONE (OUTPATIENT)
Dept: URGENT CARE | Facility: CLINIC | Age: 32
End: 2022-12-02
Payer: COMMERCIAL

## 2022-12-07 ENCOUNTER — PATIENT MESSAGE (OUTPATIENT)
Dept: INTERNAL MEDICINE | Facility: CLINIC | Age: 32
End: 2022-12-07
Payer: COMMERCIAL

## 2022-12-08 ENCOUNTER — OFFICE VISIT (OUTPATIENT)
Dept: INTERNAL MEDICINE | Facility: CLINIC | Age: 32
End: 2022-12-08
Payer: COMMERCIAL

## 2022-12-08 VITALS — SYSTOLIC BLOOD PRESSURE: 136 MMHG | DIASTOLIC BLOOD PRESSURE: 89 MMHG | HEART RATE: 120 BPM

## 2022-12-08 DIAGNOSIS — R52 BODY ACHES: Primary | ICD-10-CM

## 2022-12-08 DIAGNOSIS — R05.9 COUGH, UNSPECIFIED TYPE: ICD-10-CM

## 2022-12-08 DIAGNOSIS — R68.83 CHILLS (WITHOUT FEVER): ICD-10-CM

## 2022-12-08 LAB
CTP QC/QA: YES
CTP QC/QA: YES
POC MOLECULAR INFLUENZA A AGN: NEGATIVE
POC MOLECULAR INFLUENZA B AGN: NEGATIVE
SARS-COV-2 RDRP RESP QL NAA+PROBE: POSITIVE

## 2022-12-08 PROCEDURE — 3075F PR MOST RECENT SYSTOLIC BLOOD PRESS GE 130-139MM HG: ICD-10-PCS | Mod: CPTII,95,, | Performed by: FAMILY MEDICINE

## 2022-12-08 PROCEDURE — 99213 PR OFFICE/OUTPT VISIT, EST, LEVL III, 20-29 MIN: ICD-10-PCS | Mod: 95,,, | Performed by: FAMILY MEDICINE

## 2022-12-08 PROCEDURE — 3079F DIAST BP 80-89 MM HG: CPT | Mod: CPTII,95,, | Performed by: FAMILY MEDICINE

## 2022-12-08 PROCEDURE — 99213 OFFICE O/P EST LOW 20 MIN: CPT | Mod: 95,,, | Performed by: FAMILY MEDICINE

## 2022-12-08 PROCEDURE — 3075F SYST BP GE 130 - 139MM HG: CPT | Mod: CPTII,95,, | Performed by: FAMILY MEDICINE

## 2022-12-08 PROCEDURE — 3079F PR MOST RECENT DIASTOLIC BLOOD PRESSURE 80-89 MM HG: ICD-10-PCS | Mod: CPTII,95,, | Performed by: FAMILY MEDICINE

## 2022-12-08 NOTE — PROGRESS NOTES
The patient location is: [place] (home)  The chief complaint leading to consultation is: URI/congestion/body aches     Visit type: audiovisual    Face to Face time with patient:  15 minutes   15 minutes of total time spent on the encounter, which includes face to face time and non-face to face time preparing to see the patient (eg, review of tests), Obtaining and/or reviewing separately obtained history, Documenting clinical information in the electronic or other health record, Independently interpreting results (not separately reported) and communicating results to the patient/family/caregiver, or Care coordination (not separately reported).       Each patient to whom he or she provides medical services by telemedicine is:  (1) informed of the relationship between the physician and patient and the respective role of any other health care provider with respect to management of the patient; and (2) notified that he or she may decline to receive medical services by telemedicine and may withdraw from such care at any time.      Subjective:       Patient ID: Felicianoyiboris Love is a 32 y.o. female.    Chief Complaint: congestion/Headache      HPI Ms Love presents today for cold symptoms   Nov 13th had the flu  Went to    She has asthma so she was having shortness of breath    Went back a couple days later with weakness and aching body  She was given exofluza and it helped  Still had cough  Clear mucus     Since Thanksgiving she has been fine    Yesterday started feeling sick again  Chills, cough, weakness  Took tylenol for her headache       Review of Systems   Constitutional:  Negative for activity change and unexpected weight change.   HENT:  Positive for rhinorrhea. Negative for hearing loss and trouble swallowing.    Eyes:  Negative for discharge and visual disturbance.   Respiratory:  Negative for chest tightness and wheezing.    Cardiovascular:  Positive for palpitations. Negative for chest pain.    Gastrointestinal:  Negative for blood in stool, constipation, diarrhea and vomiting.   Endocrine: Negative for polydipsia and polyuria.   Genitourinary:  Negative for difficulty urinating, dysuria, hematuria and menstrual problem.   Musculoskeletal:  Negative for arthralgias, joint swelling and neck pain.   Neurological:  Positive for weakness and headaches.   Psychiatric/Behavioral:  Negative for confusion and dysphoric mood.          Past Medical History:   Diagnosis Date    Asthma     COVID-19 08/2020    Essential hypertension 4/27/2017    Right knee injury 07/27/2020    Staph aureus infection      No past surgical history on file.  Family History   Problem Relation Age of Onset    Hypertension Maternal Grandmother     No Known Problems Mother     Hypertension Father      Social History     Socioeconomic History    Marital status: Single   Tobacco Use    Smoking status: Never    Smokeless tobacco: Never   Substance and Sexual Activity    Alcohol use: Yes     Comment: socially    Drug use: No    Sexual activity: Yes     Partners: Male     Birth control/protection: Condom, Pill     Social Determinants of Health     Financial Resource Strain: Unknown    Difficulty of Paying Living Expenses: Patient refused   Food Insecurity: Unknown    Worried About Running Out of Food in the Last Year: Patient refused    Ran Out of Food in the Last Year: Patient refused   Transportation Needs: No Transportation Needs    Lack of Transportation (Medical): No    Lack of Transportation (Non-Medical): No   Physical Activity: Insufficiently Active    Days of Exercise per Week: 3 days    Minutes of Exercise per Session: 30 min   Stress: No Stress Concern Present    Feeling of Stress : Only a little   Social Connections: Unknown    Frequency of Communication with Friends and Family: Twice a week    Frequency of Social Gatherings with Friends and Family: Once a week    Active Member of Clubs or Organizations: No    Attends Club or  Organization Meetings: Never    Marital Status: Living with partner   Housing Stability: Low Risk     Unable to Pay for Housing in the Last Year: No    Number of Places Lived in the Last Year: 1    Unstable Housing in the Last Year: No       Objective:        Physical Exam  Vitals reviewed.   Constitutional:       Appearance: Normal appearance.      Comments: Nasal congestion    Eyes:      Extraocular Movements: Extraocular movements intact.   Pulmonary:      Effort: Pulmonary effort is normal.   Neurological:      General: No focal deficit present.      Mental Status: She is alert and oriented to person, place, and time.   Psychiatric:         Mood and Affect: Mood normal.         Behavior: Behavior normal.         Results for orders placed or performed in visit on 04/01/22   POCT Influenza A/B Molecular   Result Value Ref Range    POC Molecular Influenza A Ag Negative Negative, Not Reported    POC Molecular Influenza B Ag Negative Negative, Not Reported     Acceptable Yes    POCT urine pregnancy   Result Value Ref Range    POC Preg Test, Ur Negative Negative     Acceptable Yes        Assessment/Plan:     Body aches  -     POCT COVID-19 Rapid Screening  -     POCT Influenza A/B Molecular    Cough, unspecified type  -     POCT COVID-19 Rapid Screening  -     POCT Influenza A/B Molecular    Chills (without fever)  -     POCT COVID-19 Rapid Screening  -     POCT Influenza A/B Molecular    Positive for COVID   Notes she works from home but went to a baby shower on this past weekend     Follow up as needed     Stephanie Schwartz MD  [facility]   Family Medicine

## 2022-12-12 ENCOUNTER — PATIENT MESSAGE (OUTPATIENT)
Dept: INTERNAL MEDICINE | Facility: CLINIC | Age: 32
End: 2022-12-12
Payer: COMMERCIAL

## 2023-01-04 ENCOUNTER — PATIENT MESSAGE (OUTPATIENT)
Dept: OBSTETRICS AND GYNECOLOGY | Facility: CLINIC | Age: 33
End: 2023-01-04
Payer: COMMERCIAL

## 2023-01-06 ENCOUNTER — OFFICE VISIT (OUTPATIENT)
Dept: OBSTETRICS AND GYNECOLOGY | Facility: CLINIC | Age: 33
End: 2023-01-06
Payer: COMMERCIAL

## 2023-01-06 ENCOUNTER — PATIENT MESSAGE (OUTPATIENT)
Dept: INTERNAL MEDICINE | Facility: CLINIC | Age: 33
End: 2023-01-06
Payer: COMMERCIAL

## 2023-01-06 ENCOUNTER — LAB VISIT (OUTPATIENT)
Dept: LAB | Facility: HOSPITAL | Age: 33
End: 2023-01-06
Attending: NURSE PRACTITIONER
Payer: COMMERCIAL

## 2023-01-06 ENCOUNTER — PATIENT MESSAGE (OUTPATIENT)
Dept: OBSTETRICS AND GYNECOLOGY | Facility: CLINIC | Age: 33
End: 2023-01-06
Payer: COMMERCIAL

## 2023-01-06 VITALS
DIASTOLIC BLOOD PRESSURE: 111 MMHG | WEIGHT: 185.44 LBS | BODY MASS INDEX: 30.89 KG/M2 | HEIGHT: 65 IN | SYSTOLIC BLOOD PRESSURE: 155 MMHG

## 2023-01-06 DIAGNOSIS — Z31.69 ENCOUNTER FOR PRECONCEPTION CONSULTATION: Primary | ICD-10-CM

## 2023-01-06 DIAGNOSIS — N64.4 BREAST TENDERNESS: ICD-10-CM

## 2023-01-06 DIAGNOSIS — M54.50 ACUTE MIDLINE LOW BACK PAIN WITHOUT SCIATICA: ICD-10-CM

## 2023-01-06 DIAGNOSIS — Z31.69 ENCOUNTER FOR PRECONCEPTION CONSULTATION: ICD-10-CM

## 2023-01-06 PROBLEM — F41.9 ANXIETY: Status: ACTIVE | Noted: 2023-01-06

## 2023-01-06 LAB
B-HCG UR QL: NEGATIVE
BILIRUB SERPL-MCNC: NEGATIVE MG/DL
BLOOD URINE, POC: NEGATIVE
CLARITY, POC UA: NORMAL
COLOR, POC UA: YELLOW
CTP QC/QA: YES
GLUCOSE UR QL STRIP: NORMAL
HCG INTACT+B SERPL-ACNC: <2.4 MIU/ML
KETONES UR QL STRIP: NEGATIVE
LEUKOCYTE ESTERASE URINE, POC: NEGATIVE
NITRITE, POC UA: NEGATIVE
PH, POC UA: 7
PROTEIN, POC: NEGATIVE
SPECIFIC GRAVITY, POC UA: 1.01
UROBILINOGEN, POC UA: NORMAL

## 2023-01-06 PROCEDURE — 99213 PR OFFICE/OUTPT VISIT, EST, LEVL III, 20-29 MIN: ICD-10-PCS | Mod: 25,S$GLB,, | Performed by: NURSE PRACTITIONER

## 2023-01-06 PROCEDURE — 1160F RVW MEDS BY RX/DR IN RCRD: CPT | Mod: CPTII,S$GLB,, | Performed by: NURSE PRACTITIONER

## 2023-01-06 PROCEDURE — 36415 COLL VENOUS BLD VENIPUNCTURE: CPT | Mod: PO | Performed by: NURSE PRACTITIONER

## 2023-01-06 PROCEDURE — 1159F PR MEDICATION LIST DOCUMENTED IN MEDICAL RECORD: ICD-10-PCS | Mod: CPTII,S$GLB,, | Performed by: NURSE PRACTITIONER

## 2023-01-06 PROCEDURE — 1160F PR REVIEW ALL MEDS BY PRESCRIBER/CLIN PHARMACIST DOCUMENTED: ICD-10-PCS | Mod: CPTII,S$GLB,, | Performed by: NURSE PRACTITIONER

## 2023-01-06 PROCEDURE — 3077F SYST BP >= 140 MM HG: CPT | Mod: CPTII,S$GLB,, | Performed by: NURSE PRACTITIONER

## 2023-01-06 PROCEDURE — 1159F MED LIST DOCD IN RCRD: CPT | Mod: CPTII,S$GLB,, | Performed by: NURSE PRACTITIONER

## 2023-01-06 PROCEDURE — 99213 OFFICE O/P EST LOW 20 MIN: CPT | Mod: 25,S$GLB,, | Performed by: NURSE PRACTITIONER

## 2023-01-06 PROCEDURE — 3080F PR MOST RECENT DIASTOLIC BLOOD PRESSURE >= 90 MM HG: ICD-10-PCS | Mod: CPTII,S$GLB,, | Performed by: NURSE PRACTITIONER

## 2023-01-06 PROCEDURE — 3008F PR BODY MASS INDEX (BMI) DOCUMENTED: ICD-10-PCS | Mod: CPTII,S$GLB,, | Performed by: NURSE PRACTITIONER

## 2023-01-06 PROCEDURE — 99999 PR PBB SHADOW E&M-EST. PATIENT-LVL III: ICD-10-PCS | Mod: PBBFAC,,, | Performed by: NURSE PRACTITIONER

## 2023-01-06 PROCEDURE — 84702 CHORIONIC GONADOTROPIN TEST: CPT | Performed by: NURSE PRACTITIONER

## 2023-01-06 PROCEDURE — 3080F DIAST BP >= 90 MM HG: CPT | Mod: CPTII,S$GLB,, | Performed by: NURSE PRACTITIONER

## 2023-01-06 PROCEDURE — 81002 PR URINALYSIS NONAUTO W/O SCOPE: ICD-10-PCS | Mod: S$GLB,,, | Performed by: NURSE PRACTITIONER

## 2023-01-06 PROCEDURE — 81002 URINALYSIS NONAUTO W/O SCOPE: CPT | Mod: S$GLB,,, | Performed by: NURSE PRACTITIONER

## 2023-01-06 PROCEDURE — 81025 URINE PREGNANCY TEST: CPT | Mod: S$GLB,,, | Performed by: NURSE PRACTITIONER

## 2023-01-06 PROCEDURE — 3077F PR MOST RECENT SYSTOLIC BLOOD PRESSURE >= 140 MM HG: ICD-10-PCS | Mod: CPTII,S$GLB,, | Performed by: NURSE PRACTITIONER

## 2023-01-06 PROCEDURE — 99999 PR PBB SHADOW E&M-EST. PATIENT-LVL III: CPT | Mod: PBBFAC,,, | Performed by: NURSE PRACTITIONER

## 2023-01-06 PROCEDURE — 3008F BODY MASS INDEX DOCD: CPT | Mod: CPTII,S$GLB,, | Performed by: NURSE PRACTITIONER

## 2023-01-06 PROCEDURE — 81025 POCT URINE PREGNANCY: ICD-10-PCS | Mod: S$GLB,,, | Performed by: NURSE PRACTITIONER

## 2023-01-06 NOTE — PROGRESS NOTES
Subjective:       Patient ID: Smith Love is a 32 y.o. female.    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   Date of Last Pap: 2020    OB History    Para Term  AB Living   0 0 0 0 0 0   SAB IAB Ectopic Multiple Live Births   0 0 0 0 0       Review of Systems  Review of Systems   Constitutional:  Negative for chills, fatigue and fever.   Genitourinary:  Positive for pelvic pain. Negative for dysuria, frequency, hematuria, menstrual problem and urgency.   Musculoskeletal:  Positive for back pain.   Integumentary:  Positive for breast tenderness.   All other systems reviewed and are negative.  Breast: Positive for tenderness.        Objective:      Physical Exam:   Constitutional: She is oriented to person, place, and time. She appears well-developed and well-nourished. No distress.    HENT:   Head: Normocephalic and atraumatic.    Eyes: Pupils are equal, round, and reactive to light. Conjunctivae and EOM are normal.      Pulmonary/Chest: Effort normal.                  Musculoskeletal: Normal range of motion and moves all extremeties.       Neurological: She is alert and oriented to person, place, and time.    Skin: Skin is warm and dry. No rash noted. She is not diaphoretic. No erythema. No pallor.    Psychiatric: She has a normal mood and affect. Her behavior is normal. Judgment and thought content normal.           Assessment:        1. Encounter for preconception consultation     2. Acute midline low back pain without sciatica    3. Breast tenderness               Plan:   UPT neg  Beta today    Patient was counseled to start folic acid supplementation 400mcg po q day.  Limit alcohol intake.  Continue well-balanced diet and exercise.  Counseled on timed intercourse within 24-48 hours of LH surge.  Advised to notify us once she conceives.  Needs infertility evaluation if does not conceive within 1 year.    OPKs    Continue annual well woman exam.    Encounter for preconception consultation  -     POCT urine pregnancy  -     HCG, Quantitative; Future; Expected date: 01/06/2023    Acute midline low back pain without sciatica  -     POCT URINE DIPSTICK WITHOUT MICROSCOPE  -     HCG, Quantitative; Future; Expected date: 01/06/2023    Breast tenderness  -     HCG, Quantitative; Future; Expected date: 01/06/2023

## 2023-01-09 ENCOUNTER — PATIENT MESSAGE (OUTPATIENT)
Dept: OBSTETRICS AND GYNECOLOGY | Facility: CLINIC | Age: 33
End: 2023-01-09
Payer: COMMERCIAL

## 2023-01-09 ENCOUNTER — OFFICE VISIT (OUTPATIENT)
Dept: URGENT CARE | Facility: CLINIC | Age: 33
End: 2023-01-09
Payer: COMMERCIAL

## 2023-01-09 VITALS
HEART RATE: 84 BPM | OXYGEN SATURATION: 100 % | SYSTOLIC BLOOD PRESSURE: 135 MMHG | DIASTOLIC BLOOD PRESSURE: 99 MMHG | HEIGHT: 65 IN | RESPIRATION RATE: 20 BRPM | WEIGHT: 185 LBS | BODY MASS INDEX: 30.82 KG/M2 | TEMPERATURE: 98 F

## 2023-01-09 DIAGNOSIS — R51.9 SINUS HEADACHE: Primary | ICD-10-CM

## 2023-01-09 LAB
CTP QC/QA: YES
CTP QC/QA: YES
POC MOLECULAR INFLUENZA A AGN: NEGATIVE
POC MOLECULAR INFLUENZA B AGN: NEGATIVE
SARS-COV-2 AG RESP QL IA.RAPID: NEGATIVE

## 2023-01-09 PROCEDURE — 87811 SARS CORONAVIRUS 2 ANTIGEN POCT, MANUAL READ: ICD-10-PCS | Mod: QW,S$GLB,, | Performed by: PHYSICIAN ASSISTANT

## 2023-01-09 PROCEDURE — 3008F BODY MASS INDEX DOCD: CPT | Mod: CPTII,S$GLB,, | Performed by: PHYSICIAN ASSISTANT

## 2023-01-09 PROCEDURE — 99213 PR OFFICE/OUTPT VISIT, EST, LEVL III, 20-29 MIN: ICD-10-PCS | Mod: S$GLB,,, | Performed by: PHYSICIAN ASSISTANT

## 2023-01-09 PROCEDURE — 1159F MED LIST DOCD IN RCRD: CPT | Mod: CPTII,S$GLB,, | Performed by: PHYSICIAN ASSISTANT

## 2023-01-09 PROCEDURE — 3080F DIAST BP >= 90 MM HG: CPT | Mod: CPTII,S$GLB,, | Performed by: PHYSICIAN ASSISTANT

## 2023-01-09 PROCEDURE — 1160F RVW MEDS BY RX/DR IN RCRD: CPT | Mod: CPTII,S$GLB,, | Performed by: PHYSICIAN ASSISTANT

## 2023-01-09 PROCEDURE — 3075F PR MOST RECENT SYSTOLIC BLOOD PRESS GE 130-139MM HG: ICD-10-PCS | Mod: CPTII,S$GLB,, | Performed by: PHYSICIAN ASSISTANT

## 2023-01-09 PROCEDURE — 3075F SYST BP GE 130 - 139MM HG: CPT | Mod: CPTII,S$GLB,, | Performed by: PHYSICIAN ASSISTANT

## 2023-01-09 PROCEDURE — 99213 OFFICE O/P EST LOW 20 MIN: CPT | Mod: S$GLB,,, | Performed by: PHYSICIAN ASSISTANT

## 2023-01-09 PROCEDURE — 87502 POCT INFLUENZA A/B MOLECULAR: ICD-10-PCS | Mod: QW,S$GLB,, | Performed by: PHYSICIAN ASSISTANT

## 2023-01-09 PROCEDURE — 87811 SARS-COV-2 COVID19 W/OPTIC: CPT | Mod: QW,S$GLB,, | Performed by: PHYSICIAN ASSISTANT

## 2023-01-09 PROCEDURE — 3008F PR BODY MASS INDEX (BMI) DOCUMENTED: ICD-10-PCS | Mod: CPTII,S$GLB,, | Performed by: PHYSICIAN ASSISTANT

## 2023-01-09 PROCEDURE — 3080F PR MOST RECENT DIASTOLIC BLOOD PRESSURE >= 90 MM HG: ICD-10-PCS | Mod: CPTII,S$GLB,, | Performed by: PHYSICIAN ASSISTANT

## 2023-01-09 PROCEDURE — 1160F PR REVIEW ALL MEDS BY PRESCRIBER/CLIN PHARMACIST DOCUMENTED: ICD-10-PCS | Mod: CPTII,S$GLB,, | Performed by: PHYSICIAN ASSISTANT

## 2023-01-09 PROCEDURE — 1159F PR MEDICATION LIST DOCUMENTED IN MEDICAL RECORD: ICD-10-PCS | Mod: CPTII,S$GLB,, | Performed by: PHYSICIAN ASSISTANT

## 2023-01-09 PROCEDURE — 87502 INFLUENZA DNA AMP PROBE: CPT | Mod: QW,S$GLB,, | Performed by: PHYSICIAN ASSISTANT

## 2023-01-09 NOTE — PATIENT INSTRUCTIONS
Advise increase p.o. fluids--at least 64 ounces of water/juice & rest  Meds:  Flonase, daily anti histamine and Mucinex for symptoms.  Normal saline nasal wash to irrigate sinuses and for congestion/runny nose.  Cool mist humidifier/vaporizer.  Practice good handwashing.  Mucinex for cough and chest congestion.  Tylenol or Ibuprofen for fever, headache and body aches.  Warm salt water gargles for throat comfort.  Chloraseptic spray or lozenges for throat comfort.  See PCP or go to ER if symptoms worsen or fail to improve with treatment.

## 2023-01-09 NOTE — PROGRESS NOTES
"Subjective:       Patient ID: Smith Love is a 32 y.o. female.    Vitals:  height is 5' 5" (1.651 m) and weight is 83.9 kg (185 lb). Her temperature is 98.4 °F (36.9 °C). Her blood pressure is 135/99 (abnormal) and her pulse is 84. Her respiration is 20 and oxygen saturation is 100%.     Chief Complaint: Sinus Problem    Pt c/o sinus pressure, nasal congestion, headaches x ear fullness x 2 days. Exposed to illness of unknown origin in the household. Saline nasal spray, Flonase and Chlorhexidine to alleviate sx's. Pt states she had Covid last month.  States her boyfriend had URI type symptoms last week.  Denies fever, body aches or chills.    Sinus Problem  This is a new problem. The current episode started yesterday. The problem has been waxing and waning since onset. There has been no fever. Her pain is at a severity of 4/10. The pain is moderate. Associated symptoms include congestion, headaches and sinus pressure. Sore throat: adache.Past treatments include spray decongestants. The treatment provided mild relief.     HENT:  Positive for congestion and sinus pressure. Sore throat: adache.   Neurological:  Positive for headaches.     Objective:      Physical Exam   Constitutional: She is oriented to person, place, and time. She appears well-developed.   HENT:   Head: Normocephalic and atraumatic.   Ears:   Right Ear: External ear normal.   Left Ear: External ear normal.   Nose: Right sinus exhibits maxillary sinus tenderness and frontal sinus tenderness. Left sinus exhibits maxillary sinus tenderness and frontal sinus tenderness.   Mouth/Throat: Uvula is midline, oropharynx is clear and moist and mucous membranes are normal. Mucous membranes are moist. No tonsillar exudate.   Eyes: Conjunctivae and EOM are normal. Pupils are equal, round, and reactive to light.   Neck: Neck supple.   Cardiovascular: Normal rate, regular rhythm, normal heart sounds and normal pulses.   Pulmonary/Chest: Effort normal and " breath sounds normal.   Musculoskeletal: Normal range of motion.         General: Normal range of motion.   Neurological: She is alert and oriented to person, place, and time.   Skin: Skin is warm and dry.   Vitals reviewed.      Assessment:       1. Sinus headache          Plan:         Sinus headache  -     SARS Coronavirus 2 Antigen, POCT Manual Read  -     POCT Influenza A/B Molecular    Negative flu and Covid test.     Advise increase p.o. fluids--at least 64 ounces of water/juice & rest  Meds:  Flonase, daily anti histamine and Mucinex for symptoms.  Normal saline nasal wash to irrigate sinuses and for congestion/runny nose.  Cool mist humidifier/vaporizer.  Practice good handwashing.  Mucinex for cough and chest congestion.  Tylenol or Ibuprofen for fever, headache and body aches.  Warm salt water gargles for throat comfort.  Chloraseptic spray or lozenges for throat comfort.  See PCP or go to ER if symptoms worsen or fail to improve with treatment.

## 2023-01-10 ENCOUNTER — TELEPHONE (OUTPATIENT)
Dept: URGENT CARE | Facility: CLINIC | Age: 33
End: 2023-01-10
Payer: COMMERCIAL

## 2023-01-10 NOTE — TELEPHONE ENCOUNTER
Patient states overall symptoms are improved.  She is having mild sinus pain underneath her right eye still.  Advised to continue taking an antihistamine and Mucinex.  Follow up as needed.    Edwin Morris PA-C

## 2023-01-11 ENCOUNTER — OFFICE VISIT (OUTPATIENT)
Dept: INTERNAL MEDICINE | Facility: CLINIC | Age: 33
End: 2023-01-11
Payer: COMMERCIAL

## 2023-01-11 ENCOUNTER — PATIENT MESSAGE (OUTPATIENT)
Dept: INTERNAL MEDICINE | Facility: CLINIC | Age: 33
End: 2023-01-11

## 2023-01-11 DIAGNOSIS — R09.81 SINUS CONGESTION: Primary | ICD-10-CM

## 2023-01-11 PROCEDURE — 99212 OFFICE O/P EST SF 10 MIN: CPT | Mod: 95,,, | Performed by: NURSE PRACTITIONER

## 2023-01-11 PROCEDURE — 1159F MED LIST DOCD IN RCRD: CPT | Mod: CPTII,95,, | Performed by: NURSE PRACTITIONER

## 2023-01-11 PROCEDURE — 99212 PR OFFICE/OUTPT VISIT, EST, LEVL II, 10-19 MIN: ICD-10-PCS | Mod: 95,,, | Performed by: NURSE PRACTITIONER

## 2023-01-11 PROCEDURE — 1160F PR REVIEW ALL MEDS BY PRESCRIBER/CLIN PHARMACIST DOCUMENTED: ICD-10-PCS | Mod: CPTII,95,, | Performed by: NURSE PRACTITIONER

## 2023-01-11 PROCEDURE — 1160F RVW MEDS BY RX/DR IN RCRD: CPT | Mod: CPTII,95,, | Performed by: NURSE PRACTITIONER

## 2023-01-11 PROCEDURE — 1159F PR MEDICATION LIST DOCUMENTED IN MEDICAL RECORD: ICD-10-PCS | Mod: CPTII,95,, | Performed by: NURSE PRACTITIONER

## 2023-01-11 RX ORDER — METHYLPREDNISOLONE 4 MG/1
TABLET ORAL
Qty: 1 EACH | Refills: 0 | Status: SHIPPED | OUTPATIENT
Start: 2023-01-11 | End: 2023-02-02

## 2023-01-11 RX ORDER — IBUPROFEN 800 MG/1
800 TABLET ORAL EVERY 6 HOURS PRN
Qty: 30 TABLET | Refills: 1 | Status: SHIPPED | OUTPATIENT
Start: 2023-01-11 | End: 2023-06-23

## 2023-01-11 RX ORDER — DEXBROMPHENIRAMINE MALEATE AND PHENYLEPHRINE HYDROCHLORIDE 2; 10 MG/1; MG/1
1 TABLET ORAL EVERY 6 HOURS PRN
Qty: 28 TABLET | Refills: 0 | Status: SHIPPED | OUTPATIENT
Start: 2023-01-11 | End: 2023-01-25

## 2023-01-11 NOTE — PROGRESS NOTES
Smith Love  01/11/2023  [id number]      The patient location is: home   The chief complaint leading to consultation is: sinus congestion  Visit type: Virtual visit with synchronous audio and video  Total time spent with patient: 15 min  Each patient to whom he or she provides medical services by telemedicine is:  (1) informed of the relationship between the physician and patient and the respective role of any other health care provider with respect to management of the patient; and (2) notified that he or she may decline to receive medical services by telemedicine and may withdraw from such care at any time.        Chief Complaint:      History of Present Illness:    31 yo female presents via telemedicine with co of pressure in her face and head for 1 week.  Pt went to urgent care and was diagnosis with sinus pressure. Yesterday the pressure decreased but pain headache. Today pt reports slight pressure in her head.     Review of Systems   HENT:  Positive for congestion and sinus pain.      History:  Past Medical History:   Diagnosis Date    Asthma     COVID-19 08/2020    Essential hypertension 04/27/2017    Right knee injury 07/27/2020    Staph aureus infection      No past surgical history on file.    Family History   Problem Relation Age of Onset    Hypertension Maternal Grandmother     Hypertension Father     No Known Problems Mother      Social History     Socioeconomic History    Marital status: Single   Tobacco Use    Smoking status: Never    Smokeless tobacco: Never   Substance and Sexual Activity    Alcohol use: Yes     Comment: socially    Drug use: No    Sexual activity: Yes     Partners: Male     Birth control/protection: Condom, Pill     Social Determinants of Health     Financial Resource Strain: Low Risk     Difficulty of Paying Living Expenses: Not hard at all   Food Insecurity: No Food Insecurity    Worried About Running Out of Food in the Last Year: Never true    Ran Out of Food in the Last  Year: Never true   Transportation Needs: No Transportation Needs    Lack of Transportation (Medical): No    Lack of Transportation (Non-Medical): No   Physical Activity: Insufficiently Active    Days of Exercise per Week: 4 days    Minutes of Exercise per Session: 30 min   Stress: Stress Concern Present    Feeling of Stress : Very much   Social Connections: Unknown    Frequency of Communication with Friends and Family: More than three times a week    Frequency of Social Gatherings with Friends and Family: Once a week    Active Member of Clubs or Organizations: No    Attends Club or Organization Meetings: Never    Marital Status: Living with partner   Housing Stability: Unknown    Unable to Pay for Housing in the Last Year: No    Unstable Housing in the Last Year: No     Patient Active Problem List   Diagnosis    Asthma, mild intermittent    Hypertension    Seasonal allergic rhinitis due to pollen    Anxiety     Review of patient's allergies indicates:  No Known Allergies    The following were reviewed at this visit: active problem list, medication list, allergies, family history, social history, and health maintenance.    Medications:  Current Outpatient Medications on File Prior to Visit   Medication Sig Dispense Refill    amLODIPine (NORVASC) 5 MG tablet Take 1 tablet (5 mg total) by mouth once daily. 30 tablet 5    busPIRone (BUSPAR) 7.5 MG tablet Take 1 tablet (7.5 mg total) by mouth 3 (three) times daily as needed (anxiety/stress). (Patient not taking: Reported on 1/6/2023) 90 tablet 0    diclofenac sodium (VOLTAREN) 1 % Gel Apply 2 g topically 4 (four) times daily. for 10 days 100 g 0    fluticasone-salmeterol diskus inhaler 250-50 mcg Inhale 1 puff into the lungs 2 (two) times daily. Controller 180 each 5     No current facility-administered medications on file prior to visit.       Exam:  Wt Readings from Last 3 Encounters:   01/09/23 83.9 kg (185 lb)   01/06/23 84.1 kg (185 lb 6.5 oz)   11/29/22 79.4 kg  (175 lb)     Temp Readings from Last 3 Encounters:   01/09/23 98.4 °F (36.9 °C)   11/29/22 98.1 °F (36.7 °C) (Oral)   05/30/22 98.1 °F (36.7 °C) (Temporal)     BP Readings from Last 3 Encounters:   01/09/23 (!) 135/99   01/06/23 (!) 155/111   12/08/22 136/89     Pulse Readings from Last 3 Encounters:   01/09/23 84   12/08/22 (!) 120   11/29/22 70     There is no height or weight on file to calculate BMI.      @ROS@    Physical Exam  Vitals and nursing note reviewed.   Constitutional:       Appearance: Normal appearance. She is obese.   HENT:      Right Ear: There is no impacted cerumen.      Nose: Congestion and rhinorrhea present.   Eyes:      Extraocular Movements: Extraocular movements intact.      Conjunctiva/sclera: Conjunctivae normal.      Pupils: Pupils are equal, round, and reactive to light.   Pulmonary:      Breath sounds: Normal breath sounds. Stridor present.   Neurological:      General: No focal deficit present.      Mental Status: She is alert and oriented to person, place, and time.   Psychiatric:         Mood and Affect: Mood normal.         Behavior: Behavior normal.         Thought Content: Thought content normal.         Judgment: Judgment normal.       Laboratory Reviewed ({Yes)  Lab Results   Component Value Date    WBC 6.30 09/11/2015    HGB 14.4 09/11/2015    HCT 41.3 09/11/2015     09/11/2015    CHOL 106 (L) 03/12/2021    TRIG 30 03/12/2021    HDL 42 03/12/2021    ALT 13 03/12/2021    AST 14 03/12/2021     03/12/2021    K 3.9 03/12/2021     03/12/2021    CREATININE 0.8 03/12/2021    BUN 10 03/12/2021    CO2 26 03/12/2021    TSH 2.035 03/12/2021    GLUF 148 (H) 08/29/2018    HGBA1C 4.7 03/12/2021       Diagnoses and all orders for this visit:    Sinus congestion  -     methylPREDNISolone (MEDROL DOSEPACK) 4 mg tablet; TAKE AS DIRECTED  -     ibuprofen (ADVIL,MOTRIN) 800 MG tablet; Take 1 tablet (800 mg total) by mouth every 6 (six) hours as needed.  -      dexbrompheniramine-phenyleph (ALA-HIST PE) 2-10 mg Tab; Take 1 tablet by mouth every 6 (six) hours as needed (sinus congestion).

## 2023-01-27 ENCOUNTER — PATIENT MESSAGE (OUTPATIENT)
Dept: OBSTETRICS AND GYNECOLOGY | Facility: CLINIC | Age: 33
End: 2023-01-27
Payer: COMMERCIAL

## 2023-02-01 ENCOUNTER — PATIENT MESSAGE (OUTPATIENT)
Dept: OBSTETRICS AND GYNECOLOGY | Facility: CLINIC | Age: 33
End: 2023-02-01
Payer: COMMERCIAL

## 2023-02-02 ENCOUNTER — OFFICE VISIT (OUTPATIENT)
Dept: OBSTETRICS AND GYNECOLOGY | Facility: CLINIC | Age: 33
End: 2023-02-02
Payer: COMMERCIAL

## 2023-02-02 ENCOUNTER — LAB VISIT (OUTPATIENT)
Dept: LAB | Facility: HOSPITAL | Age: 33
End: 2023-02-02
Attending: NURSE PRACTITIONER
Payer: COMMERCIAL

## 2023-02-02 DIAGNOSIS — Z32.01 POSITIVE PREGNANCY TEST: ICD-10-CM

## 2023-02-02 DIAGNOSIS — Z32.01 POSITIVE PREGNANCY TEST: Primary | ICD-10-CM

## 2023-02-02 LAB — HCG INTACT+B SERPL-ACNC: <2.4 MIU/ML

## 2023-02-02 PROCEDURE — 99213 PR OFFICE/OUTPT VISIT, EST, LEVL III, 20-29 MIN: ICD-10-PCS | Mod: 95,,, | Performed by: NURSE PRACTITIONER

## 2023-02-02 PROCEDURE — 1160F RVW MEDS BY RX/DR IN RCRD: CPT | Mod: CPTII,95,, | Performed by: NURSE PRACTITIONER

## 2023-02-02 PROCEDURE — 84702 CHORIONIC GONADOTROPIN TEST: CPT | Performed by: NURSE PRACTITIONER

## 2023-02-02 PROCEDURE — 1159F MED LIST DOCD IN RCRD: CPT | Mod: CPTII,95,, | Performed by: NURSE PRACTITIONER

## 2023-02-02 PROCEDURE — 36415 COLL VENOUS BLD VENIPUNCTURE: CPT | Mod: PO | Performed by: NURSE PRACTITIONER

## 2023-02-02 PROCEDURE — 99213 OFFICE O/P EST LOW 20 MIN: CPT | Mod: 95,,, | Performed by: NURSE PRACTITIONER

## 2023-02-02 PROCEDURE — 1160F PR REVIEW ALL MEDS BY PRESCRIBER/CLIN PHARMACIST DOCUMENTED: ICD-10-PCS | Mod: CPTII,95,, | Performed by: NURSE PRACTITIONER

## 2023-02-02 PROCEDURE — 1159F PR MEDICATION LIST DOCUMENTED IN MEDICAL RECORD: ICD-10-PCS | Mod: CPTII,95,, | Performed by: NURSE PRACTITIONER

## 2023-02-02 RX ORDER — ALBUTEROL SULFATE 0.83 MG/ML
SOLUTION RESPIRATORY (INHALATION)
COMMUNITY
Start: 2022-11-13 | End: 2023-06-23 | Stop reason: SDUPTHER

## 2023-02-02 RX ORDER — FOLIC ACID 0.4 MG
400 TABLET ORAL DAILY
COMMUNITY
End: 2024-02-16

## 2023-02-02 RX ORDER — ZINC GLUCONATE 50 MG
50 TABLET ORAL DAILY
COMMUNITY
End: 2024-03-05

## 2023-02-02 RX ORDER — FLUTICASONE PROPIONATE 50 MCG
2 SPRAY, SUSPENSION (ML) NASAL
COMMUNITY
Start: 2023-01-16 | End: 2023-03-03 | Stop reason: SDUPTHER

## 2023-02-02 RX ORDER — ASCORBIC ACID 500 MG
500 TABLET ORAL DAILY
COMMUNITY

## 2023-02-02 NOTE — PROGRESS NOTES
Subjective:       Patient ID: Smith Love is a 32 y.o. female.    Chief Complaint:  Possible Pregnancy      History of Present Illness  HPI  The patient location is: [place], [place]  The chief complaint leading to consultation is: positive pregnancy test    Visit type: audiovisual    Face to Face time with patient: 23 minutes of total time spent on the encounter, which includes face to face time and non-face to face time preparing to see the patient (eg, review of tests), Obtaining and/or reviewing separately obtained history, Documenting clinical information in the electronic or other health record, Independently interpreting results (not separately reported) and communicating results to the patient/family/caregiver, or Care coordination (not separately reported).         Each patient to whom he or she provides medical services by telemedicine is:  (1) informed of the relationship between the physician and patient and the respective role of any other health care provider with respect to management of the patient; and (2) notified that he or she may decline to receive medical services by telemedicine and may withdraw from such care at any time.    Notes:   G0 present for faint positive UPT   10d post ovulation  Took another test later that day around 1600 and the line was not as dark  Very nervous  Cramping daily -- since 7-8d after ovulation  Mild headache and back ache today  Nervous to take another test  Urinary frequency, mild side pain  Denies any spotting    GYN & OB History  Patient's last menstrual period was 2023 (exact date).   Date of Last Pap: 2020    OB History    Para Term  AB Living   0 0 0 0 0 0   SAB IAB Ectopic Multiple Live Births   0 0 0 0 0       Review of Systems  Review of Systems   Genitourinary:  Positive for frequency, menstrual problem and pelvic pain. Negative for dysuria, urgency and urinary incontinence.   Musculoskeletal:  Positive for  back pain.   Neurological:  Positive for headaches.   All other systems reviewed and are negative.        Objective:      Physical Exam:   Constitutional: She is oriented to person, place, and time. She appears well-developed and well-nourished. No distress.    HENT:   Head: Normocephalic and atraumatic.    Eyes: Pupils are equal, round, and reactive to light. Conjunctivae and EOM are normal.      Pulmonary/Chest: Effort normal.                  Musculoskeletal: Normal range of motion and moves all extremeties.       Neurological: She is alert and oriented to person, place, and time.    Skin: She is not diaphoretic.    Psychiatric: She has a normal mood and affect. Her behavior is normal. Judgment and thought content normal.         Assessment:        1. Positive pregnancy test               Plan:   Betas ordered and scheduled    Precautions given    Has virtual with Dr. Schwartz on Monday to discuss her medications    Continue annual well woman exam.    Positive pregnancy test  -     HCG, Quantitative; Standing

## 2023-02-03 ENCOUNTER — PATIENT MESSAGE (OUTPATIENT)
Dept: OBSTETRICS AND GYNECOLOGY | Facility: CLINIC | Age: 33
End: 2023-02-03
Payer: COMMERCIAL

## 2023-02-24 ENCOUNTER — PATIENT MESSAGE (OUTPATIENT)
Dept: OBSTETRICS AND GYNECOLOGY | Facility: CLINIC | Age: 33
End: 2023-02-24
Payer: COMMERCIAL

## 2023-03-03 ENCOUNTER — OFFICE VISIT (OUTPATIENT)
Dept: INTERNAL MEDICINE | Facility: CLINIC | Age: 33
End: 2023-03-03
Payer: COMMERCIAL

## 2023-03-03 VITALS
HEART RATE: 96 BPM | BODY MASS INDEX: 31.44 KG/M2 | DIASTOLIC BLOOD PRESSURE: 100 MMHG | WEIGHT: 188.69 LBS | RESPIRATION RATE: 18 BRPM | TEMPERATURE: 98 F | HEIGHT: 65 IN | OXYGEN SATURATION: 100 % | SYSTOLIC BLOOD PRESSURE: 138 MMHG

## 2023-03-03 DIAGNOSIS — J32.9 SINUSITIS, UNSPECIFIED CHRONICITY, UNSPECIFIED LOCATION: ICD-10-CM

## 2023-03-03 DIAGNOSIS — I10 ESSENTIAL HYPERTENSION: ICD-10-CM

## 2023-03-03 DIAGNOSIS — R09.81 SINUS CONGESTION: Primary | ICD-10-CM

## 2023-03-03 PROCEDURE — 3075F PR MOST RECENT SYSTOLIC BLOOD PRESS GE 130-139MM HG: ICD-10-PCS | Mod: CPTII,S$GLB,, | Performed by: FAMILY MEDICINE

## 2023-03-03 PROCEDURE — 3075F SYST BP GE 130 - 139MM HG: CPT | Mod: CPTII,S$GLB,, | Performed by: FAMILY MEDICINE

## 2023-03-03 PROCEDURE — 3008F BODY MASS INDEX DOCD: CPT | Mod: CPTII,S$GLB,, | Performed by: FAMILY MEDICINE

## 2023-03-03 PROCEDURE — 1159F PR MEDICATION LIST DOCUMENTED IN MEDICAL RECORD: ICD-10-PCS | Mod: CPTII,S$GLB,, | Performed by: FAMILY MEDICINE

## 2023-03-03 PROCEDURE — 3080F PR MOST RECENT DIASTOLIC BLOOD PRESSURE >= 90 MM HG: ICD-10-PCS | Mod: CPTII,S$GLB,, | Performed by: FAMILY MEDICINE

## 2023-03-03 PROCEDURE — 3080F DIAST BP >= 90 MM HG: CPT | Mod: CPTII,S$GLB,, | Performed by: FAMILY MEDICINE

## 2023-03-03 PROCEDURE — 99999 PR PBB SHADOW E&M-EST. PATIENT-LVL IV: CPT | Mod: PBBFAC,,, | Performed by: FAMILY MEDICINE

## 2023-03-03 PROCEDURE — 3008F PR BODY MASS INDEX (BMI) DOCUMENTED: ICD-10-PCS | Mod: CPTII,S$GLB,, | Performed by: FAMILY MEDICINE

## 2023-03-03 PROCEDURE — 99999 PR PBB SHADOW E&M-EST. PATIENT-LVL IV: ICD-10-PCS | Mod: PBBFAC,,, | Performed by: FAMILY MEDICINE

## 2023-03-03 PROCEDURE — 99214 OFFICE O/P EST MOD 30 MIN: CPT | Mod: S$GLB,,, | Performed by: FAMILY MEDICINE

## 2023-03-03 PROCEDURE — 99214 PR OFFICE/OUTPT VISIT, EST, LEVL IV, 30-39 MIN: ICD-10-PCS | Mod: S$GLB,,, | Performed by: FAMILY MEDICINE

## 2023-03-03 PROCEDURE — 1159F MED LIST DOCD IN RCRD: CPT | Mod: CPTII,S$GLB,, | Performed by: FAMILY MEDICINE

## 2023-03-03 RX ORDER — FLUTICASONE PROPIONATE 50 MCG
2 SPRAY, SUSPENSION (ML) NASAL DAILY PRN
Qty: 15.8 ML | Refills: 1 | Status: SHIPPED | OUTPATIENT
Start: 2023-03-03 | End: 2023-04-02

## 2023-03-03 RX ORDER — METHYLPREDNISOLONE 4 MG/1
TABLET ORAL
Qty: 1 EACH | Refills: 0 | Status: SHIPPED | OUTPATIENT
Start: 2023-03-03 | End: 2023-03-24

## 2023-03-03 RX ORDER — MONTELUKAST SODIUM 10 MG/1
10 TABLET ORAL NIGHTLY
Qty: 90 TABLET | Refills: 0 | Status: SHIPPED | OUTPATIENT
Start: 2023-03-03 | End: 2023-05-30

## 2023-03-03 RX ORDER — AMLODIPINE BESYLATE 10 MG/1
10 TABLET ORAL DAILY
Qty: 90 TABLET | Refills: 1 | Status: SHIPPED | OUTPATIENT
Start: 2023-03-03 | End: 2023-06-23

## 2023-03-03 RX ORDER — AMOXICILLIN 500 MG/1
500 TABLET, FILM COATED ORAL EVERY 12 HOURS
Qty: 20 TABLET | Refills: 0 | Status: SHIPPED | OUTPATIENT
Start: 2023-03-03 | End: 2023-03-13

## 2023-03-03 NOTE — PROGRESS NOTES
Subjective:       Patient ID: Smith Love is a 32 y.o. female.    Chief Complaint: Sinus Problem    HPI Ms Love presents today for pressure in the face   Pain in the jaw area    Tightness in the head and t zone of the face     Symptoms off and on since January  ENT visit     She has used nasal saline and this helped a little with the pressure   She didn't take anything else outside of a steroids prescribed   She notes pressure decreased       Review of Systems   Constitutional:  Negative for activity change, appetite change, fatigue and fever.   HENT:  Negative for congestion, ear pain and sinus pressure.    Eyes:  Negative for pain and visual disturbance.   Respiratory:  Negative for cough, chest tightness and wheezing.    Cardiovascular:  Negative for chest pain, palpitations and leg swelling.   Gastrointestinal:  Negative for abdominal distention, abdominal pain, constipation, diarrhea, nausea and vomiting.   Endocrine: Negative for polydipsia and polyuria.   Genitourinary:  Negative for difficulty urinating, dyspareunia, dysuria, flank pain and hematuria.   Musculoskeletal:  Negative for arthralgias and back pain.   Skin:  Negative for rash.   Neurological:  Negative for dizziness, tremors, syncope, weakness, numbness and headaches.   Psychiatric/Behavioral:  Negative for agitation and confusion.          Past Medical History:   Diagnosis Date    Asthma     COVID-19 08/2020    Essential hypertension 04/27/2017    Right knee injury 07/27/2020    Staph aureus infection      No past surgical history on file.  Family History   Problem Relation Age of Onset    Hypertension Maternal Grandmother     Hypertension Father     No Known Problems Mother      Social History     Socioeconomic History    Marital status: Single   Tobacco Use    Smoking status: Never    Smokeless tobacco: Never   Substance and Sexual Activity    Alcohol use: Yes     Comment: socially    Drug use: No    Sexual activity: Yes      Partners: Male     Birth control/protection: Condom, Pill     Social Determinants of Health     Financial Resource Strain: Low Risk     Difficulty of Paying Living Expenses: Not hard at all   Food Insecurity: No Food Insecurity    Worried About Running Out of Food in the Last Year: Never true    Ran Out of Food in the Last Year: Never true   Transportation Needs: No Transportation Needs    Lack of Transportation (Medical): No    Lack of Transportation (Non-Medical): No   Physical Activity: Insufficiently Active    Days of Exercise per Week: 2 days    Minutes of Exercise per Session: 30 min   Stress: Stress Concern Present    Feeling of Stress : To some extent   Social Connections: Unknown    Frequency of Communication with Friends and Family: More than three times a week    Frequency of Social Gatherings with Friends and Family: Once a week    Active Member of Clubs or Organizations: No    Attends Club or Organization Meetings: Never    Marital Status: Living with partner   Housing Stability: Low Risk     Unable to Pay for Housing in the Last Year: No    Number of Places Lived in the Last Year: 1    Unstable Housing in the Last Year: No       Objective:        Physical Exam  Vitals reviewed.   Constitutional:       Appearance: Normal appearance.   HENT:      Head: Normocephalic and atraumatic.      Right Ear: Hearing and tympanic membrane normal.      Left Ear: Hearing and tympanic membrane normal.      Nose:      Right Sinus: Maxillary sinus tenderness and frontal sinus tenderness present.      Left Sinus: Maxillary sinus tenderness and frontal sinus tenderness present.   Pulmonary:      Effort: Pulmonary effort is normal.   Musculoskeletal:         General: Normal range of motion.   Neurological:      Mental Status: She is alert and oriented to person, place, and time.   Psychiatric:         Mood and Affect: Mood normal.         Behavior: Behavior normal.         Results for orders placed or performed in visit on  02/02/23   HCG, Quantitative   Result Value Ref Range    HCG Quant <2.4 See Text mIU/mL       Assessment/Plan:     Sinus congestion  -     fluticasone propionate (FLONASE) 50 mcg/actuation nasal spray; 2 sprays (100 mcg total) by Each Nostril route daily as needed for Rhinitis or Allergies.  Dispense: 15.8 mL; Refill: 1  -     montelukast (SINGULAIR) 10 mg tablet; Take 1 tablet (10 mg total) by mouth every evening.  Dispense: 90 tablet; Refill: 0  -     methylPREDNISolone (MEDROL DOSEPACK) 4 mg tablet; use as directed  Dispense: 1 each; Refill: 0    Sinusitis, unspecified chronicity, unspecified location  -     amoxicillin (AMOXIL) 500 MG Tab; Take 1 tablet (500 mg total) by mouth every 12 (twelve) hours. for 10 days  Dispense: 20 tablet; Refill: 0    Essential hypertension  -     amLODIPine (NORVASC) 10 MG tablet; Take 1 tablet (10 mg total) by mouth once daily.  Dispense: 90 tablet; Refill: 1          Follow up as needed     Stephanie Schwartz MD  [facility]   Family Medicine

## 2023-03-24 ENCOUNTER — OFFICE VISIT (OUTPATIENT)
Dept: INTERNAL MEDICINE | Facility: CLINIC | Age: 33
End: 2023-03-24
Payer: COMMERCIAL

## 2023-03-24 VITALS
HEIGHT: 65 IN | OXYGEN SATURATION: 98 % | SYSTOLIC BLOOD PRESSURE: 138 MMHG | WEIGHT: 193.13 LBS | BODY MASS INDEX: 32.18 KG/M2 | DIASTOLIC BLOOD PRESSURE: 86 MMHG | HEART RATE: 93 BPM

## 2023-03-24 DIAGNOSIS — I10 HYPERTENSION, UNSPECIFIED TYPE: Primary | ICD-10-CM

## 2023-03-24 DIAGNOSIS — J32.9 SINUSITIS, UNSPECIFIED CHRONICITY, UNSPECIFIED LOCATION: ICD-10-CM

## 2023-03-24 DIAGNOSIS — R51.9 FACE PAIN: ICD-10-CM

## 2023-03-24 DIAGNOSIS — R51.9 NONINTRACTABLE HEADACHE, UNSPECIFIED CHRONICITY PATTERN, UNSPECIFIED HEADACHE TYPE: ICD-10-CM

## 2023-03-24 PROCEDURE — 99999 PR PBB SHADOW E&M-EST. PATIENT-LVL V: CPT | Mod: PBBFAC,,, | Performed by: NURSE PRACTITIONER

## 2023-03-24 PROCEDURE — 99999 PR PBB SHADOW E&M-EST. PATIENT-LVL V: ICD-10-PCS | Mod: PBBFAC,,, | Performed by: NURSE PRACTITIONER

## 2023-03-24 PROCEDURE — 3075F PR MOST RECENT SYSTOLIC BLOOD PRESS GE 130-139MM HG: ICD-10-PCS | Mod: CPTII,S$GLB,, | Performed by: NURSE PRACTITIONER

## 2023-03-24 PROCEDURE — 3079F PR MOST RECENT DIASTOLIC BLOOD PRESSURE 80-89 MM HG: ICD-10-PCS | Mod: CPTII,S$GLB,, | Performed by: NURSE PRACTITIONER

## 2023-03-24 PROCEDURE — 96372 THER/PROPH/DIAG INJ SC/IM: CPT | Mod: S$GLB,,, | Performed by: NURSE PRACTITIONER

## 2023-03-24 PROCEDURE — 99214 PR OFFICE/OUTPT VISIT, EST, LEVL IV, 30-39 MIN: ICD-10-PCS | Mod: 25,S$GLB,, | Performed by: NURSE PRACTITIONER

## 2023-03-24 PROCEDURE — 3075F SYST BP GE 130 - 139MM HG: CPT | Mod: CPTII,S$GLB,, | Performed by: NURSE PRACTITIONER

## 2023-03-24 PROCEDURE — 96372 PR INJECTION,THERAP/PROPH/DIAG2ST, IM OR SUBCUT: ICD-10-PCS | Mod: S$GLB,,, | Performed by: NURSE PRACTITIONER

## 2023-03-24 PROCEDURE — 1160F RVW MEDS BY RX/DR IN RCRD: CPT | Mod: CPTII,S$GLB,, | Performed by: NURSE PRACTITIONER

## 2023-03-24 PROCEDURE — 99214 OFFICE O/P EST MOD 30 MIN: CPT | Mod: 25,S$GLB,, | Performed by: NURSE PRACTITIONER

## 2023-03-24 PROCEDURE — 3008F PR BODY MASS INDEX (BMI) DOCUMENTED: ICD-10-PCS | Mod: CPTII,S$GLB,, | Performed by: NURSE PRACTITIONER

## 2023-03-24 PROCEDURE — 3008F BODY MASS INDEX DOCD: CPT | Mod: CPTII,S$GLB,, | Performed by: NURSE PRACTITIONER

## 2023-03-24 PROCEDURE — 1159F MED LIST DOCD IN RCRD: CPT | Mod: CPTII,S$GLB,, | Performed by: NURSE PRACTITIONER

## 2023-03-24 PROCEDURE — 1160F PR REVIEW ALL MEDS BY PRESCRIBER/CLIN PHARMACIST DOCUMENTED: ICD-10-PCS | Mod: CPTII,S$GLB,, | Performed by: NURSE PRACTITIONER

## 2023-03-24 PROCEDURE — 3079F DIAST BP 80-89 MM HG: CPT | Mod: CPTII,S$GLB,, | Performed by: NURSE PRACTITIONER

## 2023-03-24 PROCEDURE — 1159F PR MEDICATION LIST DOCUMENTED IN MEDICAL RECORD: ICD-10-PCS | Mod: CPTII,S$GLB,, | Performed by: NURSE PRACTITIONER

## 2023-03-24 RX ORDER — METHYLPREDNISOLONE 4 MG/1
TABLET ORAL
Qty: 1 EACH | Refills: 0 | Status: SHIPPED | OUTPATIENT
Start: 2023-03-24 | End: 2023-04-17

## 2023-03-24 RX ORDER — DEXAMETHASONE SODIUM PHOSPHATE 100 MG/10ML
10 INJECTION INTRAMUSCULAR; INTRAVENOUS
Status: COMPLETED | OUTPATIENT
Start: 2023-03-24 | End: 2023-03-24

## 2023-03-24 RX ORDER — DEXBROMPHENIRAMINE MALEATE AND PHENYLEPHRINE HYDROCHLORIDE 2; 10 MG/1; MG/1
1 TABLET ORAL EVERY 6 HOURS PRN
Qty: 28 TABLET | Refills: 0 | Status: SHIPPED | OUTPATIENT
Start: 2023-03-24 | End: 2023-04-07

## 2023-03-24 RX ORDER — LOSARTAN POTASSIUM AND HYDROCHLOROTHIAZIDE 12.5; 5 MG/1; MG/1
1 TABLET ORAL DAILY
Qty: 30 TABLET | Refills: 0 | Status: SHIPPED | OUTPATIENT
Start: 2023-03-24 | End: 2023-06-23

## 2023-03-24 RX ADMIN — DEXAMETHASONE SODIUM PHOSPHATE 10 MG: 100 INJECTION INTRAMUSCULAR; INTRAVENOUS at 04:03

## 2023-03-24 NOTE — PROGRESS NOTES
Smith Love  03/28/2023  [id number]    Stephanie Schwartz MD  Patient Care Team:  Stephanie Schwartz MD as PCP - General (Internal Medicine)  Arlet Mcclelland MD as PCP - Family Medicine (Family Medicine)          Visit Type:an urgent visit for a new problem    Chief Complaint:  Chief Complaint   Patient presents with    Headache       History of Present Illness:     32-year-old female presents today with a complaint of headache and sinus pressure for 3 days.    Patient is concerned that she has been having more frequent sinus pressure and has followed up with ENT.  Requesting a CT because her headaches and sinus pressure are becoming more frequent.  Patient is also concerned about her elevated blood pressure.    History:  Past Medical History:   Diagnosis Date    Asthma     COVID-19 08/2020    Essential hypertension 04/27/2017    Right knee injury 07/27/2020    Staph aureus infection      History reviewed. No pertinent surgical history.  Family History   Problem Relation Age of Onset    Hypertension Maternal Grandmother     Hypertension Father     No Known Problems Mother      Social History     Socioeconomic History    Marital status: Single   Tobacco Use    Smoking status: Never    Smokeless tobacco: Never   Substance and Sexual Activity    Alcohol use: Yes     Comment: socially    Drug use: No    Sexual activity: Yes     Partners: Male     Birth control/protection: Condom, Pill     Social Determinants of Health     Financial Resource Strain: Low Risk     Difficulty of Paying Living Expenses: Not hard at all   Food Insecurity: No Food Insecurity    Worried About Running Out of Food in the Last Year: Never true    Ran Out of Food in the Last Year: Never true   Transportation Needs: No Transportation Needs    Lack of Transportation (Medical): No    Lack of Transportation (Non-Medical): No   Physical Activity: Insufficiently Active    Days of Exercise per Week: 2 days    Minutes of Exercise per Session: 30 min    Stress: Stress Concern Present    Feeling of Stress : To some extent   Social Connections: Unknown    Frequency of Communication with Friends and Family: More than three times a week    Frequency of Social Gatherings with Friends and Family: Once a week    Active Member of Clubs or Organizations: No    Attends Club or Organization Meetings: Never    Marital Status: Living with partner   Housing Stability: Low Risk     Unable to Pay for Housing in the Last Year: No    Number of Places Lived in the Last Year: 1    Unstable Housing in the Last Year: No     Patient Active Problem List   Diagnosis    Asthma, mild intermittent    Hypertension    Seasonal allergic rhinitis due to pollen    Anxiety     Review of patient's allergies indicates:  No Known Allergies    The following were reviewed at this visit: active problem list, medication list, allergies, family history, social history, and health maintenance.    Medications:  Current Outpatient Medications on File Prior to Visit   Medication Sig Dispense Refill    albuterol (PROVENTIL) 2.5 mg /3 mL (0.083 %) nebulizer solution Take by nebulization.      amLODIPine (NORVASC) 10 MG tablet Take 1 tablet (10 mg total) by mouth once daily. 90 tablet 1    ascorbic acid, vitamin C, (VITAMIN C) 500 MG tablet Take 500 mg by mouth once daily.      fluticasone propionate (FLONASE) 50 mcg/actuation nasal spray 2 sprays (100 mcg total) by Each Nostril route daily as needed for Rhinitis or Allergies. 15.8 mL 1    folic acid (FOLVITE) 400 MCG tablet Take 400 mcg by mouth once daily.      ibuprofen (ADVIL,MOTRIN) 800 MG tablet Take 1 tablet (800 mg total) by mouth every 6 (six) hours as needed. 30 tablet 1    montelukast (SINGULAIR) 10 mg tablet Take 1 tablet (10 mg total) by mouth every evening. 90 tablet 0    zinc gluconate 50 mg tablet Take 50 mg by mouth once daily.      diclofenac sodium (VOLTAREN) 1 % Gel Apply 2 g topically 4 (four) times daily. for 10 days 100 g 0     fluticasone-salmeterol diskus inhaler 250-50 mcg Inhale 1 puff into the lungs 2 (two) times daily. Controller 180 each 5     No current facility-administered medications on file prior to visit.       Medications have been reviewed and reconciled with patient at this visit.  Barriers to medications reviewed with patient.    Adverse reactions to current medications reviewed with patient..    Over the counter medications reviewed and reconciled with patient.    Exam:  Wt Readings from Last 3 Encounters:   03/26/23 86.6 kg (190 lb 14.7 oz)   03/24/23 87.6 kg (193 lb 2 oz)   03/03/23 85.6 kg (188 lb 11.4 oz)     Temp Readings from Last 3 Encounters:   03/26/23 98.8 °F (37.1 °C) (Oral)   03/03/23 97.9 °F (36.6 °C) (Tympanic)   01/09/23 98.4 °F (36.9 °C)     BP Readings from Last 3 Encounters:   03/26/23 (!) 162/88   03/24/23 138/86   03/03/23 (!) 138/100     Pulse Readings from Last 3 Encounters:   03/26/23 90   03/24/23 93   03/03/23 96     Body mass index is 32.14 kg/m².      Review of Systems   Constitutional:  Negative for fever.   HENT:  Positive for sinus pain.    Respiratory:  Negative for cough, shortness of breath and wheezing.    Cardiovascular:  Negative for chest pain and palpitations.   Gastrointestinal:  Negative for nausea.   Neurological:  Positive for headaches. Negative for speech change and weakness.   All other systems reviewed and are negative.  Physical Exam  Vitals and nursing note reviewed.   Constitutional:       Appearance: Normal appearance. She is obese.   HENT:      Head: Normocephalic and atraumatic.      Right Ear: Tympanic membrane, ear canal and external ear normal.      Left Ear: Tympanic membrane, ear canal and external ear normal.      Nose: Congestion present.      Mouth/Throat:      Mouth: Mucous membranes are moist.      Pharynx: Oropharynx is clear.   Eyes:      Extraocular Movements: Extraocular movements intact.      Conjunctiva/sclera: Conjunctivae normal.      Pupils: Pupils are  equal, round, and reactive to light.   Cardiovascular:      Rate and Rhythm: Normal rate and regular rhythm.      Pulses: Normal pulses.      Heart sounds: Normal heart sounds.   Pulmonary:      Effort: Pulmonary effort is normal.      Breath sounds: Normal breath sounds.   Abdominal:      General: Bowel sounds are normal.      Palpations: Abdomen is soft.   Musculoskeletal:         General: Normal range of motion.      Cervical back: Normal range of motion and neck supple.   Skin:     General: Skin is warm and dry.      Capillary Refill: Capillary refill takes less than 2 seconds.   Neurological:      General: No focal deficit present.      Mental Status: She is alert and oriented to person, place, and time.   Psychiatric:         Mood and Affect: Mood normal.         Behavior: Behavior normal.         Thought Content: Thought content normal.         Judgment: Judgment normal.       Laboratory Reviewed ({Yes)  Lab Results   Component Value Date    WBC 6.30 09/11/2015    HGB 14.4 09/11/2015    HCT 41.3 09/11/2015     09/11/2015    CHOL 106 (L) 03/12/2021    TRIG 30 03/12/2021    HDL 42 03/12/2021    ALT 13 03/12/2021    AST 14 03/12/2021     03/12/2021    K 3.9 03/12/2021     03/12/2021    CREATININE 0.8 03/12/2021    BUN 10 03/12/2021    CO2 26 03/12/2021    TSH 2.035 03/12/2021    GLUF 148 (H) 08/29/2018    HGBA1C 4.7 03/12/2021       Smith was seen today for headache.    Diagnoses and all orders for this visit:    Hypertension, unspecified type  -     losartan-hydrochlorothiazide 50-12.5 mg (HYZAAR) 50-12.5 mg per tablet; Take 1 tablet by mouth once daily.    Face pain  -     CT Head Without Contrast; Future  -     dexbrompheniramine-phenyleph (ALA-HIST PE) 2-10 mg Tab; Take 1 tablet by mouth every 6 (six) hours as needed (sinus congestion).  -     methylPREDNISolone (MEDROL DOSEPACK) 4 mg tablet; TAKE AS DIRECTED  -     dexAMETHasone injection 10 mg    Sinusitis, unspecified chronicity,  unspecified location  -     dexbrompheniramine-phenyleph (ALA-HIST PE) 2-10 mg Tab; Take 1 tablet by mouth every 6 (six) hours as needed (sinus congestion).  -     methylPREDNISolone (MEDROL DOSEPACK) 4 mg tablet; TAKE AS DIRECTED  -     dexAMETHasone injection 10 mg    Nonintractable headache, unspecified chronicity pattern, unspecified headache type      Will follow up in two weeks for nurse visit for BP check     Care Plan/Goals: Reviewed    Goals    None       Smith was seen today for headache.    Diagnoses and all orders for this visit:    Hypertension, unspecified type  -     losartan-hydrochlorothiazide 50-12.5 mg (HYZAAR) 50-12.5 mg per tablet; Take 1 tablet by mouth once daily.    Face pain  -     CT Head Without Contrast; Future  -     dexbrompheniramine-phenyleph (ALA-HIST PE) 2-10 mg Tab; Take 1 tablet by mouth every 6 (six) hours as needed (sinus congestion).  -     methylPREDNISolone (MEDROL DOSEPACK) 4 mg tablet; TAKE AS DIRECTED  -     dexAMETHasone injection 10 mg    Sinusitis, unspecified chronicity, unspecified location  -     dexbrompheniramine-phenyleph (ALA-HIST PE) 2-10 mg Tab; Take 1 tablet by mouth every 6 (six) hours as needed (sinus congestion).  -     methylPREDNISolone (MEDROL DOSEPACK) 4 mg tablet; TAKE AS DIRECTED  -     dexAMETHasone injection 10 mg    Nonintractable headache, unspecified chronicity pattern, unspecified headache type       Follow up: Follow up in two weeks for, for BP F/U.    After visit summary was printed and given to patient upon discharge today.  Patient goals and care plan are included in After Visit Summary.

## 2023-03-26 ENCOUNTER — HOSPITAL ENCOUNTER (EMERGENCY)
Facility: HOSPITAL | Age: 33
Discharge: HOME | End: 2023-03-26
Attending: EMERGENCY MEDICINE
Payer: COMMERCIAL

## 2023-03-26 VITALS
DIASTOLIC BLOOD PRESSURE: 88 MMHG | WEIGHT: 190.94 LBS | SYSTOLIC BLOOD PRESSURE: 162 MMHG | BODY MASS INDEX: 30.69 KG/M2 | HEART RATE: 90 BPM | RESPIRATION RATE: 18 BRPM | OXYGEN SATURATION: 98 % | TEMPERATURE: 99 F | HEIGHT: 66 IN

## 2023-03-26 DIAGNOSIS — J32.0 MAXILLARY SINUSITIS, UNSPECIFIED CHRONICITY: Primary | ICD-10-CM

## 2023-03-26 LAB — B-HCG UR QL: NEGATIVE

## 2023-03-26 PROCEDURE — 81025 URINE PREGNANCY TEST: CPT | Performed by: NURSE PRACTITIONER

## 2023-03-26 PROCEDURE — 99284 EMERGENCY DEPT VISIT MOD MDM: CPT | Mod: 25

## 2023-03-26 RX ORDER — AMOXICILLIN AND CLAVULANATE POTASSIUM 875; 125 MG/1; MG/1
1 TABLET, FILM COATED ORAL 2 TIMES DAILY
Qty: 20 TABLET | Refills: 0 | Status: SHIPPED | OUTPATIENT
Start: 2023-03-26 | End: 2023-04-05

## 2023-03-26 NOTE — ED PROVIDER NOTES
HISTORY     Chief Complaint   Patient presents with    Headache     Pt states she has been feeling pressure in her head and sinus cavities x 3 months also c/o left ear pain, has been seen several times for the same issues     Review of patient's allergies indicates:  No Known Allergies     HPI   32-year-old female presents the emergency department for sinusitis.  Patient reports he is seen her doctor has been placed on amoxicillin.  Patient reports this has been ongoing times several weeks.  Patient reports no relief with medications prescribed.  Patient reports she has also seen ENT with no resolution in symptoms.  Patient denies any fever, chills, chest pain, shortness of breath, back pain, abdominal pain, nausea, vomiting, and all other concerns at this time.    The history is provided by the patient. No  was used.      PCP: Stephanie Schwartz MD     Past Medical History:  Past Medical History:   Diagnosis Date    Asthma     COVID-19 08/2020    Essential hypertension 04/27/2017    Right knee injury 07/27/2020    Staph aureus infection         Past Surgical History:  No past surgical history on file.     Family History:  Family History   Problem Relation Age of Onset    Hypertension Maternal Grandmother     Hypertension Father     No Known Problems Mother         Social History:  Social History     Tobacco Use    Smoking status: Never    Smokeless tobacco: Never   Substance and Sexual Activity    Alcohol use: Yes     Comment: socially    Drug use: No    Sexual activity: Yes     Partners: Male     Birth control/protection: Condom, Pill         ROS   Review of Systems   Constitutional:  Negative for fever.   HENT:  Positive for sinus pain. Negative for sore throat.    Respiratory:  Negative for shortness of breath.    Cardiovascular:  Negative for chest pain.   Gastrointestinal:  Negative for nausea.   Genitourinary:  Negative for dysuria.   Musculoskeletal:  Negative for back pain.   Skin:   "Negative for rash.   Neurological:  Negative for weakness.   Hematological:  Does not bruise/bleed easily.     PHYSICAL EXAM     Initial Vitals [03/26/23 1411]   BP Pulse Resp Temp SpO2   (!) 171/90 91 18 99.8 °F (37.7 °C) 98 %      MAP       --           Physical Exam    Nursing note and vitals reviewed.  Constitutional: She appears well-developed and well-nourished. She is not diaphoretic. No distress.   HENT:   Head: Normocephalic and atraumatic.   Nose: Right sinus exhibits maxillary sinus tenderness and frontal sinus tenderness. Left sinus exhibits maxillary sinus tenderness and frontal sinus tenderness.   Eyes: Right eye exhibits no discharge. Left eye exhibits no discharge.   Neck: Neck supple.   Normal range of motion.  Cardiovascular:  Normal rate.           Pulmonary/Chest: No respiratory distress.   Abdominal: Abdomen is soft. She exhibits no distension.   Musculoskeletal:         General: Normal range of motion.      Cervical back: Normal range of motion and neck supple.     Neurological: She is alert and oriented to person, place, and time. She has normal strength.   Skin: Skin is warm and dry.   Psychiatric: She has a normal mood and affect. Her behavior is normal. Thought content normal.        ED COURSE   Procedures  ED ONGOING VITALS:  Vitals:    03/26/23 1411 03/26/23 1551   BP: (!) 171/90 (!) 162/88   Pulse: 91 90   Resp: 18 18   Temp: 99.8 °F (37.7 °C) 98.8 °F (37.1 °C)   TempSrc: Oral Oral   SpO2: 98%    Weight: 86.6 kg (190 lb 14.7 oz)    Height: 5' 6" (1.676 m)          ABNORMAL LAB VALUES:  Labs Reviewed   PREGNANCY TEST, URINE RAPID    Narrative:     Specimen Source->Urine         ALL LAB VALUES:  Results for orders placed or performed during the hospital encounter of 03/26/23   Pregnancy, urine rapid (UPT)   Result Value Ref Range    Preg Test, Ur Negative            RADIOLOGY STUDIES:  Imaging Results              CT Maxillofacial Without Contrast (Final result)  Result time 03/26/23 " 15:37:34      Final result by Gurmeet Gonzalez MD (03/26/23 15:37:34)                   Impression:      No acute abnormality.  Additional findings as above.  Consider further evaluation/follow-up as warranted.      Electronically signed by: Gurmeet Gonzalez  Date:    03/26/2023  Time:    15:37               Narrative:    EXAMINATION:  CT MAXILLOFACIAL WITHOUT CONTRAST    CLINICAL HISTORY:  Maxillofacial pain;    TECHNIQUE:  Low dose axial images, sagittal and coronal reformations were obtained through the face.  Contrast was not administered.  All CT scans at this location are performed using dose modulation techniques as appropriate to a performed exam including the following: Automated exposure control; adjustment of the mA and/or kV according to patient size (this includes techniques or standardized protocols for targeted exams where dose is matched to indication/reason for exam; i. e. extremities or head); use of iterative reconstruction technique.    COMPARISON:  None    FINDINGS:  No focal soft tissue swelling is identified.  The osseous structures appear intact, without evidence of displaced fracture.  Temporomandibular joints appear appropriately positioned.  The orbits are within normal limits.  The paranasal sinuses are essentially clear.  The nasal cavity shows nothing unusual.  Bilateral impacted maxillary and mandibular posterior molars.    Limited intracranial evaluation is unremarkable.                                                The above vital signs and test results have been reviewed by the emergency provider.     ED Medications:  Discharge Medication List as of 3/26/2023  3:48 PM        START taking these medications    Details   amoxicillin-clavulanate 875-125mg (AUGMENTIN) 875-125 mg per tablet Take 1 tablet by mouth 2 (two) times daily. for 10 days, Starting Sun 3/26/2023, Until Wed 4/5/2023, Print           Discharge Medications:  Discharge Medication List as of 3/26/2023  3:48 PM         START taking these medications    Details   amoxicillin-clavulanate 875-125mg (AUGMENTIN) 875-125 mg per tablet Take 1 tablet by mouth 2 (two) times daily. for 10 days, Starting Sun 3/26/2023, Until Wed 4/5/2023, Print             Follow-up Information       Schedule an appointment as soon as possible for a visit  with Otolaryngology.    Specialty: Otolaryngology  Contact information:  [id number] [facility] [id number] [phone]             [facility] - Emergency Dept..    Specialty: Emergency Medicine  Why: If symptoms worsen  Contact information:  [address] [facility] [id number] [phone]                          I discussed with patient and/or family/caretaker that evaluation in the ED does not suggest any emergent or life threatening medical conditions requiring immediate intervention beyond what was provided in the ED, and I believe patient is safe for discharge. Regardless, an unremarkable evaluation in the ED does not preclude the development or presence of a serious or life threatening condition. As such, patient was instructed to return immediately for any worsening or change in current symptoms.        MEDICAL DECISION MAKING   Medical Decision Making:   Initial Assessment:   Patient presents for symptoms of sinusitis.  Patient has seen ENT has been placed on antibiotics without relief.  Differential Diagnosis:   Headache    Clinical Tests:   Radiological Study: Ordered and Reviewed  ED Management:  Patient will be treated with Augmentin since there was no relief with amoxicillin.  Patient to follow-up with ENT and return to the ER for any worsening or concerns.             CLINICAL IMPRESSION       ICD-10-CM ICD-9-CM   1. Maxillary sinusitis, unspecified chronicity  J32.0 473.0       Disposition:   Disposition: Discharged  Condition: Stable       King Ren NP  03/26/23 3547

## 2023-03-26 NOTE — ED NOTES
Patient identifiers verified and correct for Smith Love.      Pt present to er with headache. Pt taking abt .    LOC: The patient is awake, alert and aware of environment with an appropriate affect, the patient is oriented x 3 and speaking appropriately.  APPEARANCE: Patient resting comfortably and in no acute distress, patient is clean and well groomed, patient's clothing is properly fastened.  SKIN: The skin is warm and dry, color consistent with ethnicity, patient has normal skin turgor and moist mucus membranes, skin intact, no breakdown or bruising noted.  MUSCULOSKELETAL: Patient moving all extremities spontaneously.  RESPIRATORY: Airway is open and patent, respirations are spontaneous.  CARDIAC: Patient has a normal rate, no periphreal edema noted, capillary refill < 3 seconds.  ABDOMEN: Soft and non tender to palpation.

## 2023-04-03 ENCOUNTER — PATIENT MESSAGE (OUTPATIENT)
Dept: INTERNAL MEDICINE | Facility: CLINIC | Age: 33
End: 2023-04-03

## 2023-04-03 ENCOUNTER — OFFICE VISIT (OUTPATIENT)
Dept: OTOLARYNGOLOGY | Facility: CLINIC | Age: 33
End: 2023-04-03
Payer: COMMERCIAL

## 2023-04-03 ENCOUNTER — OFFICE VISIT (OUTPATIENT)
Dept: INTERNAL MEDICINE | Facility: CLINIC | Age: 33
End: 2023-04-03
Payer: COMMERCIAL

## 2023-04-03 VITALS — WEIGHT: 188.94 LBS | TEMPERATURE: 98 F | HEIGHT: 66 IN | BODY MASS INDEX: 30.36 KG/M2

## 2023-04-03 DIAGNOSIS — J02.9 SORE THROAT: ICD-10-CM

## 2023-04-03 DIAGNOSIS — I10 HYPERTENSION, UNSPECIFIED TYPE: ICD-10-CM

## 2023-04-03 DIAGNOSIS — R09.82 PND (POST-NASAL DRIP): Primary | ICD-10-CM

## 2023-04-03 DIAGNOSIS — K21.9 GASTROESOPHAGEAL REFLUX DISEASE, UNSPECIFIED WHETHER ESOPHAGITIS PRESENT: ICD-10-CM

## 2023-04-03 DIAGNOSIS — R00.2 PALPITATIONS: Primary | ICD-10-CM

## 2023-04-03 PROCEDURE — 3008F PR BODY MASS INDEX (BMI) DOCUMENTED: ICD-10-PCS | Mod: CPTII,S$GLB,, | Performed by: PHYSICIAN ASSISTANT

## 2023-04-03 PROCEDURE — 1160F RVW MEDS BY RX/DR IN RCRD: CPT | Mod: CPTII,95,, | Performed by: NURSE PRACTITIONER

## 2023-04-03 PROCEDURE — 1160F PR REVIEW ALL MEDS BY PRESCRIBER/CLIN PHARMACIST DOCUMENTED: ICD-10-PCS | Mod: CPTII,95,, | Performed by: NURSE PRACTITIONER

## 2023-04-03 PROCEDURE — 1159F PR MEDICATION LIST DOCUMENTED IN MEDICAL RECORD: ICD-10-PCS | Mod: CPTII,95,, | Performed by: NURSE PRACTITIONER

## 2023-04-03 PROCEDURE — 3008F BODY MASS INDEX DOCD: CPT | Mod: CPTII,S$GLB,, | Performed by: PHYSICIAN ASSISTANT

## 2023-04-03 PROCEDURE — 99999 PR PBB SHADOW E&M-EST. PATIENT-LVL III: ICD-10-PCS | Mod: PBBFAC,,, | Performed by: PHYSICIAN ASSISTANT

## 2023-04-03 PROCEDURE — 1159F MED LIST DOCD IN RCRD: CPT | Mod: CPTII,95,, | Performed by: NURSE PRACTITIONER

## 2023-04-03 PROCEDURE — 99203 PR OFFICE/OUTPT VISIT, NEW, LEVL III, 30-44 MIN: ICD-10-PCS | Mod: S$GLB,,, | Performed by: PHYSICIAN ASSISTANT

## 2023-04-03 PROCEDURE — 99203 OFFICE O/P NEW LOW 30 MIN: CPT | Mod: S$GLB,,, | Performed by: PHYSICIAN ASSISTANT

## 2023-04-03 PROCEDURE — 99213 OFFICE O/P EST LOW 20 MIN: CPT | Mod: 95,,, | Performed by: NURSE PRACTITIONER

## 2023-04-03 PROCEDURE — 99999 PR PBB SHADOW E&M-EST. PATIENT-LVL III: CPT | Mod: PBBFAC,,, | Performed by: PHYSICIAN ASSISTANT

## 2023-04-03 PROCEDURE — 99213 PR OFFICE/OUTPT VISIT, EST, LEVL III, 20-29 MIN: ICD-10-PCS | Mod: 95,,, | Performed by: NURSE PRACTITIONER

## 2023-04-03 NOTE — PROGRESS NOTES
Smith Love  04/03/2023  [id number]      The patient location is: home  The chief complaint leading to consultation is: palpitations   Visit type: Virtual visit with synchronous audio and video  Total time spent with patient: 10 min  Each patient to whom he or she provides medical services by telemedicine is:  (1) informed of the relationship between the physician and patient and the respective role of any other health care provider with respect to management of the patient; and (2) notified that he or she may decline to receive medical services by telemedicine and may withdraw from such care at any time.        Chief Complaint:      History of Present Illness:    33 yo female presents with palpitations for two to three weeks.  Reports SOB  Denies CP, light headedness or dizziness.  Reports she has been using her nebulizer and inhaler more the past week and noticed elevated heart rate.  Resting heart rate 61       Review of Systems   Constitutional:  Negative for fever.   Respiratory:  Negative for sputum production, shortness of breath and wheezing.    Cardiovascular:  Positive for palpitations. Negative for chest pain, orthopnea and claudication.   Neurological:  Negative for dizziness, tingling, tremors and headaches.   Psychiatric/Behavioral:  The patient is not nervous/anxious.        History:  Past Medical History:   Diagnosis Date    Asthma     COVID-19 08/2020    Essential hypertension 04/27/2017    Right knee injury 07/27/2020    Staph aureus infection      No past surgical history on file.    Family History   Problem Relation Age of Onset    Hypertension Maternal Grandmother     Hypertension Father     No Known Problems Mother      Social History     Socioeconomic History    Marital status: Single   Tobacco Use    Smoking status: Never    Smokeless tobacco: Never   Substance and Sexual Activity    Alcohol use: Yes     Comment: socially    Drug use: No    Sexual activity: Yes     Partners: Male      Birth control/protection: Condom, Pill     Social Determinants of Health     Financial Resource Strain: Low Risk     Difficulty of Paying Living Expenses: Not hard at all   Food Insecurity: No Food Insecurity    Worried About Running Out of Food in the Last Year: Never true    Ran Out of Food in the Last Year: Never true   Transportation Needs: No Transportation Needs    Lack of Transportation (Medical): No    Lack of Transportation (Non-Medical): No   Physical Activity: Insufficiently Active    Days of Exercise per Week: 3 days    Minutes of Exercise per Session: 30 min   Stress: Stress Concern Present    Feeling of Stress : To some extent   Social Connections: Unknown    Frequency of Communication with Friends and Family: More than three times a week    Frequency of Social Gatherings with Friends and Family: Once a week    Active Member of Clubs or Organizations: No    Attends Club or Organization Meetings: Never    Marital Status: Living with partner   Housing Stability: Low Risk     Unable to Pay for Housing in the Last Year: No    Number of Places Lived in the Last Year: 1    Unstable Housing in the Last Year: No     Patient Active Problem List   Diagnosis    Asthma, mild intermittent    Hypertension    Seasonal allergic rhinitis due to pollen    Anxiety     Review of patient's allergies indicates:  No Known Allergies    The following were reviewed at this visit: active problem list, medication list, allergies, family history, social history, and health maintenance.    Medications:  Current Outpatient Medications on File Prior to Visit   Medication Sig Dispense Refill    albuterol (PROVENTIL) 2.5 mg /3 mL (0.083 %) nebulizer solution Take by nebulization.      amLODIPine (NORVASC) 10 MG tablet Take 1 tablet (10 mg total) by mouth once daily. 90 tablet 1    amoxicillin-clavulanate 875-125mg (AUGMENTIN) 875-125 mg per tablet Take 1 tablet by mouth 2 (two) times daily. for 10 days 20 tablet 0    ascorbic acid,  vitamin C, (VITAMIN C) 500 MG tablet Take 500 mg by mouth once daily.      dexbrompheniramine-phenyleph (ALA-HIST PE) 2-10 mg Tab Take 1 tablet by mouth every 6 (six) hours as needed (sinus congestion). (Patient not taking: Reported on 4/3/2023) 28 tablet 0    diclofenac sodium (VOLTAREN) 1 % Gel Apply 2 g topically 4 (four) times daily. for 10 days 100 g 0    [] fluticasone propionate (FLONASE) 50 mcg/actuation nasal spray 2 sprays (100 mcg total) by Each Nostril route daily as needed for Rhinitis or Allergies. 15.8 mL 1    fluticasone-salmeterol diskus inhaler 250-50 mcg Inhale 1 puff into the lungs 2 (two) times daily. Controller 180 each 5    folic acid (FOLVITE) 400 MCG tablet Take 400 mcg by mouth once daily.      ibuprofen (ADVIL,MOTRIN) 800 MG tablet Take 1 tablet (800 mg total) by mouth every 6 (six) hours as needed. 30 tablet 1    losartan-hydrochlorothiazide 50-12.5 mg (HYZAAR) 50-12.5 mg per tablet Take 1 tablet by mouth once daily. (Patient not taking: Reported on 4/3/2023) 30 tablet 0    methylPREDNISolone (MEDROL DOSEPACK) 4 mg tablet TAKE AS DIRECTED 1 each 0    [] montelukast (SINGULAIR) 10 mg tablet Take 1 tablet (10 mg total) by mouth every evening. 90 tablet 0    zinc gluconate 50 mg tablet Take 50 mg by mouth once daily.       No current facility-administered medications on file prior to visit.       Exam:  Wt Readings from Last 3 Encounters:   23 85.7 kg (188 lb 15 oz)   23 86.6 kg (190 lb 14.7 oz)   23 87.6 kg (193 lb 2 oz)     Temp Readings from Last 3 Encounters:   23 98.2 °F (36.8 °C) (Temporal)   23 98.8 °F (37.1 °C) (Oral)   23 97.9 °F (36.6 °C) (Tympanic)     BP Readings from Last 3 Encounters:   23 (!) 162/88   23 138/86   23 (!) 138/100     Pulse Readings from Last 3 Encounters:   23 90   23 93   23 96     There is no height or weight on file to calculate BMI.      @ROS@    Physical Exam  Vitals and  nursing note reviewed.   Constitutional:       Appearance: Normal appearance. She is obese.   Eyes:      Conjunctiva/sclera: Conjunctivae normal.   Pulmonary:      Effort: Pulmonary effort is normal.   Neurological:      General: No focal deficit present.      Mental Status: She is alert and oriented to person, place, and time.   Psychiatric:         Mood and Affect: Mood normal.         Behavior: Behavior normal.         Thought Content: Thought content normal.         Judgment: Judgment normal.       Laboratory Reviewed ({Yes)  Lab Results   Component Value Date    WBC 6.30 09/11/2015    HGB 14.4 09/11/2015    HCT 41.3 09/11/2015     09/11/2015    CHOL 106 (L) 03/12/2021    TRIG 30 03/12/2021    HDL 42 03/12/2021    ALT 13 03/12/2021    AST 14 03/12/2021     03/12/2021    K 3.9 03/12/2021     03/12/2021    CREATININE 0.8 03/12/2021    BUN 10 03/12/2021    CO2 26 03/12/2021    TSH 2.035 03/12/2021    GLUF 148 (H) 08/29/2018    HGBA1C 4.7 03/12/2021       Diagnoses and all orders for this visit:    Palpitations    Keep your appt Thursday with Dr. Schwartz for follow up visit   -decrease caffeine intake until appt

## 2023-04-03 NOTE — PROGRESS NOTES
"Subjective:   Patient ID: Felicianoyiboris Love is a 32 y.o. female.    Chief Complaint: Sore Throat (Sunday morning sore throat, gargling with warm water. Patient was spitting up flem, she became hoarse. The soreness went away the hoarseness was still there. Patient stated since then she cant breath and been choking. Patient also been having facial tenstion and pain.)    Ms. Love is a very pleasant 33 yo female here to see me today with sore throat x 2 days. She states she woke up yesterday morning with sore throat and hoarseness. Her hoarseness has resolved but having some mild pain. She is also sneezing. She is not on daily allergy regimen. Denies any fever.     Review of patient's allergies indicates:  No Known Allergies        Review of Systems   Constitutional:  Negative for chills, fatigue, fever and unexpected weight change.   HENT:  Positive for postnasal drip, sneezing and sore throat. Negative for congestion, dental problem, ear discharge, ear pain, facial swelling, hearing loss, nosebleeds, rhinorrhea, sinus pressure, tinnitus, trouble swallowing and voice change.    Eyes:  Negative for redness, itching and visual disturbance.   Respiratory:  Negative for cough, choking, shortness of breath and wheezing.    Cardiovascular:  Negative for chest pain and palpitations.   Gastrointestinal:  Negative for abdominal pain.        No reflux.   Musculoskeletal:  Negative for gait problem.   Skin:  Negative for rash.   Neurological:  Negative for dizziness, light-headedness and headaches.       Objective:   Temp 98.2 °F (36.8 °C) (Temporal)   Ht 5' 6" (1.676 m)   Wt 85.7 kg (188 lb 15 oz)   BMI 30.49 kg/m²     Physical Exam  Constitutional:       General: She is not in acute distress.     Appearance: She is well-developed.   HENT:      Head: Normocephalic and atraumatic.      Right Ear: Tympanic membrane, ear canal and external ear normal.      Left Ear: Tympanic membrane, ear canal and external ear normal.      " Nose: Nose normal. No nasal deformity, septal deviation, mucosal edema or rhinorrhea.      Right Sinus: No maxillary sinus tenderness or frontal sinus tenderness.      Left Sinus: No maxillary sinus tenderness or frontal sinus tenderness.      Mouth/Throat:      Mouth: Mucous membranes are not pale and not dry.      Dentition: No dental caries.      Pharynx: Uvula midline. No oropharyngeal exudate or posterior oropharyngeal erythema.   Eyes:      General: Lids are normal. No scleral icterus.     Extraocular Movements:      Right eye: Normal extraocular motion and no nystagmus.      Left eye: Normal extraocular motion and no nystagmus.      Conjunctiva/sclera: Conjunctivae normal.      Right eye: Right conjunctiva is not injected. No chemosis.     Left eye: Left conjunctiva is not injected. No chemosis.     Pupils: Pupils are equal, round, and reactive to light.   Neck:      Thyroid: No thyroid mass or thyromegaly.      Trachea: Trachea and phonation normal. No tracheal tenderness or tracheal deviation.   Pulmonary:      Effort: Pulmonary effort is normal. No respiratory distress.      Breath sounds: No stridor.   Abdominal:      General: There is no distension.   Lymphadenopathy:      Head:      Right side of head: No submental, submandibular, preauricular, posterior auricular or occipital adenopathy.      Left side of head: No submental, submandibular, preauricular, posterior auricular or occipital adenopathy.      Cervical: No cervical adenopathy.   Skin:     General: Skin is warm and dry.      Findings: No erythema or rash.   Neurological:      Mental Status: She is alert and oriented to person, place, and time.      Cranial Nerves: No cranial nerve deficit.   Psychiatric:         Behavior: Behavior normal.            Assessment:     1. PND (post-nasal drip)    2. Gastroesophageal reflux disease, unspecified whether esophagitis present    3. Sore throat        Plan:     PND (post-nasal drip)    Gastroesophageal  reflux disease, unspecified whether esophagitis present    Sore throat    No signs of infection today. Discussed different etiologies of sore throat including PND a/w allergies vs GERD. Discussed starting Flonase nasal spray as well as course of reflux medications. We talked about reflux precautions as well.  She may f/u with ENT as needed. Plain mucinex can also be used.

## 2023-04-17 ENCOUNTER — OFFICE VISIT (OUTPATIENT)
Dept: INTERNAL MEDICINE | Facility: CLINIC | Age: 33
End: 2023-04-17
Payer: COMMERCIAL

## 2023-04-17 VITALS
HEIGHT: 66 IN | OXYGEN SATURATION: 97 % | RESPIRATION RATE: 18 BRPM | BODY MASS INDEX: 31.29 KG/M2 | HEART RATE: 94 BPM | TEMPERATURE: 98 F | WEIGHT: 194.69 LBS | DIASTOLIC BLOOD PRESSURE: 80 MMHG | SYSTOLIC BLOOD PRESSURE: 116 MMHG

## 2023-04-17 DIAGNOSIS — Z00.00 ROUTINE PHYSICAL EXAMINATION: ICD-10-CM

## 2023-04-17 DIAGNOSIS — R35.0 URINARY FREQUENCY: Primary | ICD-10-CM

## 2023-04-17 DIAGNOSIS — S01.81XD FACIAL LACERATION, SUBSEQUENT ENCOUNTER: ICD-10-CM

## 2023-04-17 LAB
BILIRUB UR QL STRIP: NEGATIVE
CLARITY UR: CLEAR
COLOR UR: YELLOW
GLUCOSE UR QL STRIP: NEGATIVE
HGB UR QL STRIP: NEGATIVE
KETONES UR QL STRIP: NEGATIVE
LEUKOCYTE ESTERASE UR QL STRIP: NEGATIVE
NITRITE UR QL STRIP: NEGATIVE
PH UR STRIP: 8 [PH] (ref 5–8)
PROT UR QL STRIP: NEGATIVE
SP GR UR STRIP: 1.01 (ref 1–1.03)
URN SPEC COLLECT METH UR: NORMAL
UROBILINOGEN UR STRIP-ACNC: NEGATIVE EU/DL

## 2023-04-17 PROCEDURE — 99999 PR PBB SHADOW E&M-EST. PATIENT-LVL III: ICD-10-PCS | Mod: PBBFAC,,, | Performed by: FAMILY MEDICINE

## 2023-04-17 PROCEDURE — 3079F DIAST BP 80-89 MM HG: CPT | Mod: CPTII,S$GLB,, | Performed by: FAMILY MEDICINE

## 2023-04-17 PROCEDURE — 3008F BODY MASS INDEX DOCD: CPT | Mod: CPTII,S$GLB,, | Performed by: FAMILY MEDICINE

## 2023-04-17 PROCEDURE — 81003 URINALYSIS AUTO W/O SCOPE: CPT | Performed by: FAMILY MEDICINE

## 2023-04-17 PROCEDURE — 99999 PR PBB SHADOW E&M-EST. PATIENT-LVL III: CPT | Mod: PBBFAC,,, | Performed by: FAMILY MEDICINE

## 2023-04-17 PROCEDURE — 99213 OFFICE O/P EST LOW 20 MIN: CPT | Mod: S$GLB,,, | Performed by: FAMILY MEDICINE

## 2023-04-17 PROCEDURE — 3079F PR MOST RECENT DIASTOLIC BLOOD PRESSURE 80-89 MM HG: ICD-10-PCS | Mod: CPTII,S$GLB,, | Performed by: FAMILY MEDICINE

## 2023-04-17 PROCEDURE — 99213 PR OFFICE/OUTPT VISIT, EST, LEVL III, 20-29 MIN: ICD-10-PCS | Mod: S$GLB,,, | Performed by: FAMILY MEDICINE

## 2023-04-17 PROCEDURE — 3008F PR BODY MASS INDEX (BMI) DOCUMENTED: ICD-10-PCS | Mod: CPTII,S$GLB,, | Performed by: FAMILY MEDICINE

## 2023-04-17 PROCEDURE — 3074F PR MOST RECENT SYSTOLIC BLOOD PRESSURE < 130 MM HG: ICD-10-PCS | Mod: CPTII,S$GLB,, | Performed by: FAMILY MEDICINE

## 2023-04-17 PROCEDURE — 3074F SYST BP LT 130 MM HG: CPT | Mod: CPTII,S$GLB,, | Performed by: FAMILY MEDICINE

## 2023-04-17 RX ORDER — DEXBROMPHENIRAMINE MALEATE AND PHENYLEPHRINE HYDROCHLORIDE 2; 10 MG/1; MG/1
1 TABLET ORAL EVERY 6 HOURS PRN
Qty: 12 TABLET | Refills: 0 | Status: SHIPPED | OUTPATIENT
Start: 2023-04-17 | End: 2023-04-27

## 2023-04-17 NOTE — PROGRESS NOTES
Subjective:       Patient ID: Smith Love is a 32 y.o. female.    Chief Complaint: Foot Swelling and Wound Check     Ms Love presents today rash and swelling feet    Noted yesterday both ankles and feet swollen.   3.5 hour flight on yesterday    Swelling has gone down  Trace edema     Hit left eyebrow getting cloths out the dryer  Went to  and it was glued   Took bandages off       Review of Systems   Constitutional:  Negative for activity change, appetite change, fatigue and fever.   HENT:  Negative for congestion, ear pain and sinus pressure.    Eyes:  Negative for pain and visual disturbance.   Respiratory:  Negative for cough, chest tightness and wheezing.    Cardiovascular:  Negative for chest pain, palpitations and leg swelling.   Gastrointestinal:  Negative for abdominal distention, abdominal pain, constipation, diarrhea, nausea and vomiting.   Endocrine: Negative for polydipsia and polyuria.   Genitourinary:  Negative for difficulty urinating, dyspareunia, dysuria, flank pain and hematuria.   Musculoskeletal:  Negative for arthralgias and back pain.   Skin:  Negative for rash.   Neurological:  Negative for dizziness, tremors, syncope, weakness, numbness and headaches.   Psychiatric/Behavioral:  Negative for agitation and confusion.          Past Medical History:   Diagnosis Date    Asthma     COVID-19 08/2020    Essential hypertension 04/27/2017    Right knee injury 07/27/2020    Staph aureus infection      No past surgical history on file.  Family History   Problem Relation Age of Onset    Hypertension Maternal Grandmother     Miscarriages / Stillbirths Maternal Grandmother     Hypertension Father     Asthma Father     No Known Problems Mother            Objective:        Physical Exam  Vitals reviewed.   Constitutional:       Appearance: Normal appearance.   HENT:      Head: Atraumatic.   Pulmonary:      Effort: Pulmonary effort is normal.   Skin:     General: Skin is warm.   Neurological:       Mental Status: She is alert.           Results for orders placed or performed in visit on 04/17/23   Urinalysis, Reflex to Urine Culture Urine, Clean Catch    Specimen: Urine   Result Value Ref Range    Specimen UA Urine, Clean Catch     Color, UA Yellow Yellow, Straw, Shamika    Appearance, UA Clear Clear    pH, UA 8.0 5.0 - 8.0    Specific Gravity, UA 1.015 1.005 - 1.030    Protein, UA Negative Negative    Glucose, UA Negative Negative    Ketones, UA Negative Negative    Bilirubin (UA) Negative Negative    Occult Blood UA Negative Negative    Nitrite, UA Negative Negative    Urobilinogen, UA Negative <2.0 EU/dL    Leukocytes, UA Negative Negative       Assessment/Plan:     Urinary frequency  -     Urinalysis, Reflex to Urine Culture Urine, Clean Catch    Facial laceration, subsequent encounter    Routine physical examination  -     Lipid Panel; Future; Expected date: 04/17/2023  -     COMPREHENSIVE METABOLIC PANEL; Future; Expected date: 04/17/2023  -     CBC Auto Differential; Future; Expected date: 04/17/2023  -     TSH; Future; Expected date: 04/17/2023    Other orders  -     dexbrompheniramine-phenyleph (ALA-HIST PE) 2-10 mg Tab; Take 1 tablet by mouth every 6 (six) hours as needed (congestion).  Dispense: 12 tablet; Refill: 0    Laceration healing  Pt can wash face at this time  The glue will come off on its own      Follow up as needed    Stephanie Schwartz MD  [facility]   Family Medicine

## 2023-05-03 ENCOUNTER — PATIENT MESSAGE (OUTPATIENT)
Dept: INTERNAL MEDICINE | Facility: CLINIC | Age: 33
End: 2023-05-03
Payer: COMMERCIAL

## 2023-05-06 ENCOUNTER — HOSPITAL ENCOUNTER (EMERGENCY)
Facility: HOSPITAL | Age: 33
Discharge: HOME | End: 2023-05-06
Attending: STUDENT IN AN ORGANIZED HEALTH CARE EDUCATION/TRAINING PROGRAM
Payer: COMMERCIAL

## 2023-05-06 VITALS
BODY MASS INDEX: 31.79 KG/M2 | RESPIRATION RATE: 18 BRPM | TEMPERATURE: 99 F | SYSTOLIC BLOOD PRESSURE: 166 MMHG | WEIGHT: 197 LBS | OXYGEN SATURATION: 100 % | DIASTOLIC BLOOD PRESSURE: 104 MMHG | HEART RATE: 94 BPM

## 2023-05-06 DIAGNOSIS — R06.02 SOB (SHORTNESS OF BREATH): Primary | ICD-10-CM

## 2023-05-06 PROCEDURE — 99282 EMERGENCY DEPT VISIT SF MDM: CPT

## 2023-05-07 NOTE — ED PROVIDER NOTES
SCRIBE #1 NOTE: I, Edin Hahn, am scribing for, and in the presence of, Bao Luo MD. I have scribed the entire note.       History     Chief Complaint   Patient presents with    Shortness of Breath     Pt with history of asthma c/o fatigue and SOB that began at approximately 1400 and has continued since that time.  She also c/o abdominal pain that began while in the lobby.     Review of patient's allergies indicates:  No Known Allergies      History of Present Illness     HPI    5/6/2023, 9:31 PM  History obtained from the patient      History of Present Illness: Smith Love is a 32 y.o. female patient with a PMHx of HTN and asthma who presents to the Emergency Department for evaluation of SOB which onset gradually around 2pm today.  Has resolved prior to arrival.  Symptoms are constant and moderate in severity. No mitigating or exacerbating factors reported. Associated sxs include fatigue and HA.  Both of these were transient and no longer present at time of arrival.  Patient states she did not eat much today in thinks that maybe why she felt slightly fatigued.  No further complaints or concerns at this time.       Arrival mode: Personal vehicle     PCP: Stephanie Schwartz MD        Past Medical History:  Past Medical History:   Diagnosis Date    Asthma     COVID-19 08/2020    Essential hypertension 04/27/2017    Right knee injury 07/27/2020    Staph aureus infection        Past Surgical History:  No past surgical history on file.      Family History:  Family History   Problem Relation Age of Onset    Hypertension Maternal Grandmother     Miscarriages / Stillbirths Maternal Grandmother     Hypertension Father     Asthma Father     No Known Problems Mother        Social History:  Social History     Tobacco Use    Smoking status: Never    Smokeless tobacco: Never   Substance and Sexual Activity    Alcohol use: Yes     Alcohol/week: 1.0 standard drink     Types: 1 Glasses of wine per week     Comment:  socially    Drug use: No    Sexual activity: Yes     Partners: Male     Birth control/protection: None        Review of Systems     Review of Systems   Constitutional:  Positive for fatigue.   Respiratory:  Positive for shortness of breath.    Neurological:  Positive for headaches.      Physical Exam     Initial Vitals [05/06/23 2047]   BP Pulse Resp Temp SpO2   (!) 166/104 94 18 98.9 °F (37.2 °C) 100 %      MAP       --          Physical Exam  Nursing Notes and Vital Signs Reviewed.  Constitutional: Patient is in no acute distress. Well-developed and well-nourished.  Head: Atraumatic. Normocephalic.  Eyes: PERRL. EOM intact. Conjunctivae are not pale. No scleral icterus.  ENT: Mucous membranes are moist. Oropharynx is clear and symmetric.    Neck: Supple. Full ROM. No lymphadenopathy.  Cardiovascular: Regular rate. Regular rhythm. No murmurs, rubs, or gallops. Distal pulses are 2+ and symmetric.  Pulmonary/Chest: No respiratory distress. Clear to auscultation bilaterally. No wheezing or rales.  Abdominal: Soft and non-distended.  There is no tenderness.  No rebound, guarding, or rigidity. Good bowel sounds.  Genitourinary: No CVA tenderness  Musculoskeletal: Moves all extremities. No obvious deformities. No edema. No calf tenderness.  Skin: Warm and dry.  Neurological:  Alert, awake, and appropriate.  Normal speech.  No acute focal neurological deficits are appreciated.  Psychiatric: Normal affect. Good eye contact. Appropriate in content.     ED Course   Procedures  ED Vital Signs:  Vitals:    05/06/23 2047   BP: (!) 166/104   Pulse: 94   Resp: 18   Temp: 98.9 °F (37.2 °C)   TempSrc: Oral   SpO2: 100%   Weight: 89.4 kg (196 lb 15.7 oz)       Abnormal Lab Results:  Labs Reviewed - No data to display     All Lab Results:  NONE    Imaging Results:  Imaging Results    None              The Emergency Provider reviewed the vital signs and test results, which are outlined above.     ED Discussion     9:33 PM: Reassessed  pt at this time. Discussed with pt all pertinent ED information and results. Discussed pt dx and plan of tx. Gave pt all f/u and return to the ED instructions. All questions and concerns were addressed at this time. Pt expresses understanding of information and instructions, and is comfortable with plan to discharge. Pt is stable for discharge.    I discussed with patient and/or family/caretaker that evaluation in the ED does not suggest any emergent or life threatening medical conditions requiring immediate intervention beyond what was provided in the ED, and I believe patient is safe for discharge.  Regardless, an unremarkable evaluation in the ED does not preclude the development or presence of a serious of life threatening condition. As such, patient was instructed to return immediately for any worsening or change in current symptoms.        Medical Decision Making:   ED Management:  32-year-old female presenting for evaluation of transient symptoms of fatigue, shortness of breath.  Asymptomatic at time of presentation.  Down's a normal vital signs.  Does not appear to have emergent or life-threatening time.  Considered broad differential including but not limited to PE, pneumonia, asthma exacerbation, pneumothorax, ACS.  Patient is not appear to have symptoms concerning for any of these at this time.  Seems safe for discharge and outpatient follow up.  Advised close follow up with PCP and return to ED return, or change in character.         ED Medication(s):  Medications - No data to display    New Prescriptions    No medications on file        Follow-up Information       Stephanie Schwartz MD. Schedule an appointment as soon as possible for a visit in 5 days.    Specialty: Internal Medicine  Why: For follow-up on today's visit.  Contact information:  [address] [place] [address] [address] [place] [address] [phone]               Go to  [facility] - Emergency Dept..    Specialty: Emergency Medicine  Why: As needed, If  symptoms worsen  Contact information:  [address]  [facility] [address] [phone]                               Scribe Attestation:   Scribe #1: I performed the above scribed service and the documentation accurately describes the services I performed. I attest to the accuracy of the note.     Attending:   Physician Attestation Statement for Scribe #1: I, Bao Luo MD, personally performed the services described in this documentation, as scribed by Edin Hahn, in my presence, and it is both accurate and complete.           Clinical Impression       ICD-10-CM ICD-9-CM   1. SOB (shortness of breath)  R06.02 786.05       Disposition:   Disposition: Discharged  Condition: Stable      Bao Luo MD  05/08/23 0023

## 2023-05-29 DIAGNOSIS — R09.81 SINUS CONGESTION: ICD-10-CM

## 2023-05-29 NOTE — TELEPHONE ENCOUNTER
No care due was identified.  [facility] Embedded Care Due Messages. Reference number: [id number].   5/29/2023 4:13:49 AM CDT

## 2023-05-30 RX ORDER — MONTELUKAST SODIUM 10 MG/1
TABLET ORAL
Qty: 90 TABLET | Refills: 0 | Status: SHIPPED | OUTPATIENT
Start: 2023-05-30 | End: 2023-06-23 | Stop reason: SDUPTHER

## 2023-05-30 NOTE — TELEPHONE ENCOUNTER
Refill Routing Note   Medication(s) are not appropriate for processing by [facility] for the following reason(s):      Patient seen in ED/Hospital since LOV with PCP  No active prescription written by PCP    [facility] action(s):  Defer None identified          Appointments  past 12m or future 3m with PCP    Date Provider   Last Visit   4/17/2023 Stephanie Schwartz MD   Next Visit   6/14/2023 Stephanie Schwartz MD   ED visits in past 90 days: 2        Note composed:9:29 AM 05/30/2023

## 2023-06-19 NOTE — PROGRESS NOTES
Subjective:       Patient ID:  Smith Love is a 28 y.o. female who presents for   Chief Complaint   Patient presents with    Rash     f/u rash to back of neck x weeks tx kenalog cream nizoral diflucan      History of Present Illness: The patient presents for follow-up of a rash on the neck/chest and seb derm. She was last seen on 5/23/19 where she was started on TAC cream, as well as ketoconazole shampoo and fluconazole weekly x 4 weeks. She reports resolution of symptoms. Denies any further rash, itching, flaking or other symptoms. Very pleased with results. No other concerning rash or skin lesions.       Review of Systems   Skin: Negative for itching, rash and dry skin.        Objective:    Physical Exam   Constitutional: She appears well-developed and well-nourished. No distress.   Neurological: She is alert and oriented to person, place, and time. She is not disoriented.   Psychiatric: She has a normal mood and affect.   Skin:   Areas Examined (abnormalities noted in diagram):   Head / Face Inspection Performed  Neck Inspection Performed  RUE Inspected  LUE Inspection Performed              Diagram Legend     Erythematous scaling macule/papule c/w actinic keratosis       Vascular papule c/w angioma      Pigmented verrucoid papule/plaque c/w seborrheic keratosis      Yellow umbilicated papule c/w sebaceous hyperplasia      Irregularly shaped tan macule c/w lentigo     1-2 mm smooth white papules consistent with Milia      Movable subcutaneous cyst with punctum c/w epidermal inclusion cyst      Subcutaneous movable cyst c/w pilar cyst      Firm pink to brown papule c/w dermatofibroma      Pedunculated fleshy papule(s) c/w skin tag(s)      Evenly pigmented macule c/w junctional nevus     Mildly variegated pigmented, slightly irregular-bordered macule c/w mildly atypical nevus      Flesh colored to evenly pigmented papule c/w intradermal nevus       Pink pearly papule/plaque c/w basal cell carcinoma       Erythematous hyperkeratotic cursted plaque c/w SCC      Surgical scar with no sign of skin cancer recurrence      Open and closed comedones      Inflammatory papules and pustules      Verrucoid papule consistent consistent with wart     Erythematous eczematous patches and plaques     Dystrophic onycholytic nail with subungual debris c/w onychomycosis     Umbilicated papule    Erythematous-base heme-crusted tan verrucoid plaque consistent with inflamed seborrheic keratosis     Erythematous Silvery Scaling Plaque c/w Psoriasis     See annotation      Assessment / Plan:        Dermatitis - resolved.   Counseled on appropriate and further use of medication if symptoms were to return.          Follow up if symptoms worsen or fail to improve.   Unknown

## 2023-06-23 ENCOUNTER — OFFICE VISIT (OUTPATIENT)
Dept: INTERNAL MEDICINE | Facility: CLINIC | Age: 33
End: 2023-06-23
Payer: COMMERCIAL

## 2023-06-23 VITALS
SYSTOLIC BLOOD PRESSURE: 128 MMHG | RESPIRATION RATE: 18 BRPM | DIASTOLIC BLOOD PRESSURE: 88 MMHG | BODY MASS INDEX: 32.31 KG/M2 | HEIGHT: 66 IN | WEIGHT: 201.06 LBS | OXYGEN SATURATION: 99 % | HEART RATE: 102 BPM

## 2023-06-23 DIAGNOSIS — J45.909 NOT WELL CONTROLLED ASTHMA WITHOUT COMPLICATION: ICD-10-CM

## 2023-06-23 DIAGNOSIS — I10 HYPERTENSION, UNSPECIFIED TYPE: Primary | ICD-10-CM

## 2023-06-23 DIAGNOSIS — Z11.52 ENCOUNTER FOR SCREENING FOR COVID-19: ICD-10-CM

## 2023-06-23 DIAGNOSIS — R09.81 SINUS CONGESTION: ICD-10-CM

## 2023-06-23 DIAGNOSIS — R06.02 SHORTNESS OF BREATH: ICD-10-CM

## 2023-06-23 LAB — SARS-COV-2 RNA RESP QL NAA+PROBE: NOT DETECTED

## 2023-06-23 PROCEDURE — 3079F DIAST BP 80-89 MM HG: CPT | Mod: CPTII,S$GLB,, | Performed by: FAMILY MEDICINE

## 2023-06-23 PROCEDURE — 3074F SYST BP LT 130 MM HG: CPT | Mod: CPTII,S$GLB,, | Performed by: FAMILY MEDICINE

## 2023-06-23 PROCEDURE — 1159F MED LIST DOCD IN RCRD: CPT | Mod: CPTII,S$GLB,, | Performed by: FAMILY MEDICINE

## 2023-06-23 PROCEDURE — 99214 PR OFFICE/OUTPT VISIT, EST, LEVL IV, 30-39 MIN: ICD-10-PCS | Mod: S$GLB,,, | Performed by: FAMILY MEDICINE

## 2023-06-23 PROCEDURE — 99999 PR PBB SHADOW E&M-EST. PATIENT-LVL IV: CPT | Mod: PBBFAC,,, | Performed by: FAMILY MEDICINE

## 2023-06-23 PROCEDURE — 87635 SARS-COV-2 COVID-19 AMP PRB: CPT | Performed by: FAMILY MEDICINE

## 2023-06-23 PROCEDURE — 3079F PR MOST RECENT DIASTOLIC BLOOD PRESSURE 80-89 MM HG: ICD-10-PCS | Mod: CPTII,S$GLB,, | Performed by: FAMILY MEDICINE

## 2023-06-23 PROCEDURE — 1159F PR MEDICATION LIST DOCUMENTED IN MEDICAL RECORD: ICD-10-PCS | Mod: CPTII,S$GLB,, | Performed by: FAMILY MEDICINE

## 2023-06-23 PROCEDURE — 3008F PR BODY MASS INDEX (BMI) DOCUMENTED: ICD-10-PCS | Mod: CPTII,S$GLB,, | Performed by: FAMILY MEDICINE

## 2023-06-23 PROCEDURE — 3008F BODY MASS INDEX DOCD: CPT | Mod: CPTII,S$GLB,, | Performed by: FAMILY MEDICINE

## 2023-06-23 PROCEDURE — 3074F PR MOST RECENT SYSTOLIC BLOOD PRESSURE < 130 MM HG: ICD-10-PCS | Mod: CPTII,S$GLB,, | Performed by: FAMILY MEDICINE

## 2023-06-23 PROCEDURE — 99999 PR PBB SHADOW E&M-EST. PATIENT-LVL IV: ICD-10-PCS | Mod: PBBFAC,,, | Performed by: FAMILY MEDICINE

## 2023-06-23 PROCEDURE — 99214 OFFICE O/P EST MOD 30 MIN: CPT | Mod: S$GLB,,, | Performed by: FAMILY MEDICINE

## 2023-06-23 RX ORDER — FLUTICASONE PROPIONATE AND SALMETEROL 250; 50 UG/1; UG/1
1 POWDER RESPIRATORY (INHALATION) 2 TIMES DAILY
Qty: 180 EACH | Refills: 5 | Status: SHIPPED | OUTPATIENT
Start: 2023-06-23 | End: 2023-10-18 | Stop reason: SDUPTHER

## 2023-06-23 RX ORDER — ALBUTEROL SULFATE 0.83 MG/ML
2.5 SOLUTION RESPIRATORY (INHALATION)
Qty: 250 ML | Refills: 0 | Status: SHIPPED | OUTPATIENT
Start: 2023-06-23 | End: 2023-09-25 | Stop reason: SDUPTHER

## 2023-06-23 RX ORDER — MONTELUKAST SODIUM 10 MG/1
10 TABLET ORAL NIGHTLY
Qty: 90 TABLET | Refills: 3 | Status: SHIPPED | OUTPATIENT
Start: 2023-06-23 | End: 2023-07-18 | Stop reason: SDUPTHER

## 2023-06-23 RX ORDER — AMLODIPINE BESYLATE 5 MG/1
5 TABLET ORAL DAILY
Qty: 90 TABLET | Refills: 1 | Status: SHIPPED | OUTPATIENT
Start: 2023-06-23 | End: 2023-12-15 | Stop reason: SDUPTHER

## 2023-06-23 NOTE — PROGRESS NOTES
Johannebridger Starla Love  32 y.o. Black or  female    Here for follow up on hypertension.    Noted swelling in the legs and feet  We decreased her amlodipine to 5 mg   She notes an improvement     She never started losartan/HCTZ  Working on weight loss    Reports taking medications as instructed.  Not taking any OTC meds.    Past Medical History:   Diagnosis Date    Asthma     COVID-19 08/2020    Essential hypertension 04/27/2017    Right knee injury 07/27/2020    Staph aureus infection          Current Outpatient Medications:     ascorbic acid, vitamin C, (VITAMIN C) 500 MG tablet, Take 500 mg by mouth once daily., Disp: , Rfl:     diclofenac sodium (VOLTAREN) 1 % Gel, Apply 2 g topically 4 (four) times daily. for 10 days, Disp: 100 g, Rfl: 0    folic acid (FOLVITE) 400 MCG tablet, Take 400 mcg by mouth once daily., Disp: , Rfl:     zinc gluconate 50 mg tablet, Take 50 mg by mouth once daily., Disp: , Rfl:     albuterol (PROVENTIL) 2.5 mg /3 mL (0.083 %) nebulizer solution, Take 3 mLs (2.5 mg total) by nebulization every 4 to 6 hours as needed for Wheezing or Shortness of Breath., Disp: 250 mL, Rfl: 0    amLODIPine (NORVASC) 5 MG tablet, Take 1 tablet (5 mg total) by mouth once daily., Disp: 90 tablet, Rfl: 1    fluticasone-salmeterol diskus inhaler 250-50 mcg, Inhale 1 puff into the lungs 2 (two) times daily. Controller, Disp: 180 each, Rfl: 5    montelukast (SINGULAIR) 10 mg tablet, Take 1 tablet (10 mg total) by mouth every evening., Disp: 90 tablet, Rfl: 3    Review of patient's allergies indicates:  No Known Allergies    ROS: Denies dizziness, headache, chest pain, shortness of breath or edema    PE:  GEN: Alert and oriented, no acute distress  HEART: Normal S1 and S2, RRR, no lower extremity edema  LUNGS: Respirations unlabored, bilaterally clear to auscultation    ASSESSMENT/PLAN:    Hypertension, unspecified type    Sinus congestion  -     montelukast (SINGULAIR) 10 mg tablet; Take 1 tablet (10  mg total) by mouth every evening.  Dispense: 90 tablet; Refill: 3    Not well controlled asthma without complication  -     fluticasone-salmeterol diskus inhaler 250-50 mcg; Inhale 1 puff into the lungs 2 (two) times daily. Controller  Dispense: 180 each; Refill: 5    Encounter for screening for COVID-19  -     POCT COVID-19 Rapid Screening    Other orders  -     amLODIPine (NORVASC) 5 MG tablet; Take 1 tablet (5 mg total) by mouth once daily.  Dispense: 90 tablet; Refill: 1  -     albuterol (PROVENTIL) 2.5 mg /3 mL (0.083 %) nebulizer solution; Take 3 mLs (2.5 mg total) by nebulization every 4 to 6 hours as needed for Wheezing or Shortness of Breath.  Dispense: 250 mL; Refill: 0        Follow up 2 weeks

## 2023-06-28 ENCOUNTER — PATIENT MESSAGE (OUTPATIENT)
Dept: INTERNAL MEDICINE | Facility: CLINIC | Age: 33
End: 2023-06-28
Payer: COMMERCIAL

## 2023-07-07 ENCOUNTER — PATIENT MESSAGE (OUTPATIENT)
Dept: INFECTIOUS DISEASES | Facility: CLINIC | Age: 33
End: 2023-07-07
Payer: COMMERCIAL

## 2023-07-15 ENCOUNTER — HOSPITAL ENCOUNTER (EMERGENCY)
Facility: HOSPITAL | Age: 33
Discharge: HOME | End: 2023-07-15
Attending: EMERGENCY MEDICINE
Payer: COMMERCIAL

## 2023-07-15 VITALS
SYSTOLIC BLOOD PRESSURE: 131 MMHG | HEIGHT: 66 IN | TEMPERATURE: 99 F | WEIGHT: 185 LBS | DIASTOLIC BLOOD PRESSURE: 100 MMHG | RESPIRATION RATE: 18 BRPM | BODY MASS INDEX: 29.73 KG/M2 | HEART RATE: 85 BPM | OXYGEN SATURATION: 98 %

## 2023-07-15 DIAGNOSIS — M54.2 NECK PAIN ON LEFT SIDE: Primary | ICD-10-CM

## 2023-07-15 LAB
B-HCG UR QL: NEGATIVE
CTP QC/QA: YES

## 2023-07-15 PROCEDURE — 25000003 PHARM REV CODE 250: Performed by: EMERGENCY MEDICINE

## 2023-07-15 PROCEDURE — 99282 EMERGENCY DEPT VISIT SF MDM: CPT

## 2023-07-15 PROCEDURE — 81025 URINE PREGNANCY TEST: CPT | Performed by: EMERGENCY MEDICINE

## 2023-07-15 RX ORDER — LIDOCAINE 50 MG/G
1 PATCH TOPICAL
Status: DISCONTINUED | OUTPATIENT
Start: 2023-07-15 | End: 2023-07-15 | Stop reason: HOSPADM

## 2023-07-15 RX ORDER — ACETAMINOPHEN 500 MG
1000 TABLET ORAL
Status: COMPLETED | OUTPATIENT
Start: 2023-07-15 | End: 2023-07-15

## 2023-07-15 RX ADMIN — ACETAMINOPHEN 1000 MG: 500 TABLET ORAL at 01:07

## 2023-07-15 RX ADMIN — LIDOCAINE 1 PATCH: 50 PATCH TOPICAL at 01:07

## 2023-07-15 NOTE — ED TRIAGE NOTES
"Shooting pain in left shoulder radiating to neck- intermittent. Used a "massage gun " on the  left aside of her neck this am. . Had a migraine yesterday . Awakened w/ head feeling like a balloon. Denies vision changes or numbness/ tingling in extremities.   "

## 2023-07-15 NOTE — DISCHARGE INSTRUCTIONS
Please return to the emergency department if the pain returns, it it is persistent or getting worse, if you develop any visual changes, slurred speech, facial weakness or numbness, extremity weakness or numbness, confusion, unsteady gait or any other concerns

## 2023-07-15 NOTE — ED NOTES
LOC: The patient is awake and alert; oriented x 3 and speaking appropriately.  APPEARANCE: Patient resting comfortably, patient is clean and well groomed  SKIN: warm and dry, normal skin turgor & moist mucus membranes, skin intact, no breakdown noted.  MUSCULOSKELETAL: Patient moving all extremities well, no obvious swelling or deformities noted, c/o left shoulder / left neck pain   RESPIRATORY: Airway is open and patent, respirations are spontaneous, normal effort and rate  CARDIAC: Patient has a normal rate, no peripheral edema noted, capillary refill < 3 seconds; No complaints of chest pain   ABDOMEN: Soft and non tender to palpation, no distention noted

## 2023-07-15 NOTE — ED PROVIDER NOTES
CC: Headache (Started last night, was driving today  pain to L ear to neck lasted few seconds and gone now, hx cluster and sinus headache)      History provided by:   Patient     HPI: Smith Love is a 32 y.o. year old female who presents to the ED for left upper back and left side neck sharp pain prior to arrival that lasted for 2 sec, not associated with n/v/weakness/numbness/visual changes/slurred speech.    She reports using a muscle massager earlier today  Hx of headaches: pressure like, frontal, unchanged  No chest pain/sob      PHYSICAL EXAM:  Vitals:    07/15/23 1123   BP: (!) 157/107   Pulse: 76   Resp: 18   Temp: 99.2 °F (37.3 °C)     VS: triage VS reviewed    general: comfortable, in no acute distress, pleasant, well nourished  HENT: neck symmetric, trachea midline  Eyes: PERRL,EOMI, no conjunctival injection and symmetrical eyelids  CV: RRR, no  murmurs, no rubs, no gallops, no LE edema, normal symmetrical radial pulses, no carotid bruit  Resp: comfortable breathing, speaks in full sentences, CTAB, no wheezing, no crackles, no ronchi  Neuro: AAO x 3, 5/5 strength x 4 extremities, sensation intact, face symmetric, speech normal, normal gait, no nystagmus  MSK: NC/AT, extremities w/out deformity   No midline CT ttp, no paracervical muscle ttp  No pain with left shoulder rom or neck rom  Skin: warm, dry      MDM:  Smith Love is a 32 y.o. year old female who presents to the ED for short lasting (2 sec) pain over the left upper back and left side neck, currently no pain and unable to reproduce pain with rom neck or shoulder ot palpation  No carotid bruit, no neuro deficit, unlikely dissection/CVA  LUE normal pulses, no neuro deficit, no pain with left shoulder rom  Patient anxious in the ED, discussed tylenol, lidoderm and signs and symptoms that would require return to the ED  Possible muscle spasm      IMPRESSION:  1.) Left upper back and left side neck pain, resolved      Dispo:  Discharge    Critical Care Time: N/A          Past Medical History:   Diagnosis Date    Asthma     COVID-19 08/2020    Essential hypertension 04/27/2017    Right knee injury 07/27/2020    Staph aureus infection      History reviewed. No pertinent surgical history.  Family History   Problem Relation Age of Onset    Hypertension Maternal Grandmother     Miscarriages / Stillbirths Maternal Grandmother     Hypertension Father     Asthma Father     No Known Problems Mother      No current facility-administered medications on file prior to encounter.     Current Outpatient Medications on File Prior to Encounter   Medication Sig Dispense Refill    amLODIPine (NORVASC) 5 MG tablet Take 1 tablet (5 mg total) by mouth once daily. 90 tablet 1    ascorbic acid, vitamin C, (VITAMIN C) 500 MG tablet Take 500 mg by mouth once daily.      folic acid (FOLVITE) 400 MCG tablet Take 400 mcg by mouth once daily.      zinc gluconate 50 mg tablet Take 50 mg by mouth once daily.      albuterol (PROVENTIL) 2.5 mg /3 mL (0.083 %) nebulizer solution Take 3 mLs (2.5 mg total) by nebulization every 4 to 6 hours as needed for Wheezing or Shortness of Breath. 250 mL 0    diclofenac sodium (VOLTAREN) 1 % Gel Apply 2 g topically 4 (four) times daily. for 10 days 100 g 0    fluticasone-salmeterol diskus inhaler 250-50 mcg Inhale 1 puff into the lungs 2 (two) times daily. Controller 180 each 5    montelukast (SINGULAIR) 10 mg tablet Take 1 tablet (10 mg total) by mouth every evening. 90 tablet 3     Patient has no known allergies.  Social History     Socioeconomic History    Marital status: Single   Tobacco Use    Smoking status: Never    Smokeless tobacco: Never   Substance and Sexual Activity    Alcohol use: Yes     Alcohol/week: 1.0 standard drink     Types: 1 Glasses of wine per week     Comment: socially    Drug use: No    Sexual activity: Yes     Partners: Male     Birth control/protection: None     Social Determinants of Health     Financial  Resource Strain: Low Risk     Difficulty of Paying Living Expenses: Not hard at all   Food Insecurity: No Food Insecurity    Worried About Running Out of Food in the Last Year: Never true    Ran Out of Food in the Last Year: Never true   Transportation Needs: No Transportation Needs    Lack of Transportation (Medical): No    Lack of Transportation (Non-Medical): No   Physical Activity: Insufficiently Active    Days of Exercise per Week: 3 days    Minutes of Exercise per Session: 30 min   Stress: Stress Concern Present    Feeling of Stress : To some extent   Social Connections: Unknown    Frequency of Communication with Friends and Family: More than three times a week    Frequency of Social Gatherings with Friends and Family: Once a week    Active Member of Clubs or Organizations: No    Attends Club or Organization Meetings: Never    Marital Status: Living with partner   Housing Stability: Low Risk     Unable to Pay for Housing in the Last Year: No    Number of Places Lived in the Last Year: 1    Unstable Housing in the Last Year: No                 DATA & INTERVENTIONS:    LABS reviewed:  Labs Reviewed   HIV 1 / 2 ANTIBODY   HEPATITIS C ANTIBODY       RADIOLOGY reviewed:  Imaging Results    None         MEDICATIONS/FLUIDS:  Medications - No data to display           María Valentino MD  07/15/23 7811

## 2023-07-18 ENCOUNTER — OFFICE VISIT (OUTPATIENT)
Dept: INTERNAL MEDICINE | Facility: CLINIC | Age: 33
End: 2023-07-18
Payer: COMMERCIAL

## 2023-07-18 VITALS
OXYGEN SATURATION: 99 % | WEIGHT: 192.44 LBS | HEART RATE: 83 BPM | DIASTOLIC BLOOD PRESSURE: 86 MMHG | HEIGHT: 66 IN | TEMPERATURE: 98 F | BODY MASS INDEX: 30.93 KG/M2 | SYSTOLIC BLOOD PRESSURE: 132 MMHG

## 2023-07-18 DIAGNOSIS — M54.2 NECK PAIN ON LEFT SIDE: ICD-10-CM

## 2023-07-18 DIAGNOSIS — I10 HYPERTENSION, UNSPECIFIED TYPE: Primary | ICD-10-CM

## 2023-07-18 DIAGNOSIS — J34.89 SINUS PRESSURE: ICD-10-CM

## 2023-07-18 DIAGNOSIS — E66.9 OBESITY (BMI 30-39.9): ICD-10-CM

## 2023-07-18 PROCEDURE — 99999 PR PBB SHADOW E&M-EST. PATIENT-LVL IV: ICD-10-PCS | Mod: PBBFAC,,,

## 2023-07-18 PROCEDURE — 99214 OFFICE O/P EST MOD 30 MIN: CPT | Mod: S$GLB,,,

## 2023-07-18 PROCEDURE — 3075F PR MOST RECENT SYSTOLIC BLOOD PRESS GE 130-139MM HG: ICD-10-PCS | Mod: CPTII,S$GLB,,

## 2023-07-18 PROCEDURE — 1159F MED LIST DOCD IN RCRD: CPT | Mod: CPTII,S$GLB,,

## 2023-07-18 PROCEDURE — 3079F DIAST BP 80-89 MM HG: CPT | Mod: CPTII,S$GLB,,

## 2023-07-18 PROCEDURE — 1160F PR REVIEW ALL MEDS BY PRESCRIBER/CLIN PHARMACIST DOCUMENTED: ICD-10-PCS | Mod: CPTII,S$GLB,,

## 2023-07-18 PROCEDURE — 3079F PR MOST RECENT DIASTOLIC BLOOD PRESSURE 80-89 MM HG: ICD-10-PCS | Mod: CPTII,S$GLB,,

## 2023-07-18 PROCEDURE — 99214 PR OFFICE/OUTPT VISIT, EST, LEVL IV, 30-39 MIN: ICD-10-PCS | Mod: S$GLB,,,

## 2023-07-18 PROCEDURE — 1160F RVW MEDS BY RX/DR IN RCRD: CPT | Mod: CPTII,S$GLB,,

## 2023-07-18 PROCEDURE — 3008F PR BODY MASS INDEX (BMI) DOCUMENTED: ICD-10-PCS | Mod: CPTII,S$GLB,,

## 2023-07-18 PROCEDURE — 99999 PR PBB SHADOW E&M-EST. PATIENT-LVL IV: CPT | Mod: PBBFAC,,,

## 2023-07-18 PROCEDURE — 1159F PR MEDICATION LIST DOCUMENTED IN MEDICAL RECORD: ICD-10-PCS | Mod: CPTII,S$GLB,,

## 2023-07-18 PROCEDURE — 3008F BODY MASS INDEX DOCD: CPT | Mod: CPTII,S$GLB,,

## 2023-07-18 PROCEDURE — 3075F SYST BP GE 130 - 139MM HG: CPT | Mod: CPTII,S$GLB,,

## 2023-07-18 RX ORDER — METHYLPREDNISOLONE 4 MG/1
TABLET ORAL
Qty: 21 EACH | Refills: 0 | Status: SHIPPED | OUTPATIENT
Start: 2023-07-18 | End: 2023-08-08

## 2023-07-18 RX ORDER — MONTELUKAST SODIUM 10 MG/1
10 TABLET ORAL NIGHTLY
Qty: 90 TABLET | Refills: 3 | Status: SHIPPED | OUTPATIENT
Start: 2023-07-18

## 2023-07-18 NOTE — PROGRESS NOTES
Smith Love  07/18/2023  [id number]    Stephanie Schwartz MD  Patient Care Team:  Stephanie Schwartz MD as PCP - General (Internal Medicine)  Arlet Mcclelland MD as PCP - Family Medicine (Family Medicine)          Visit Type:an urgent visit for a new problem    Chief Complaint:  Chief Complaint   Patient presents with    Headache    Hospital Follow Up        History of Present Illness:     Smith Love is a 32 y.o. year old female who presents to the ED for left upper back and left side neck sharp pain prior to arrival that lasted for 2 sec, not associated with n/v/weakness/numbness/visual changes/slurred speech.     She reports using a muscle massager earlier today  Hx of headaches: pressure like, frontal, unchanged  No chest pain/sob    Describes it as migraine  Had shooting pain that went from neck to shoulder  D/C home with tylenol and lidocaine patch  Pain has not happened since leaving ER  Was told that could be related to stress.     Having pain in frontal lobe and max sinuitis   Last night had pain in jaw  Denies congestion  Use daily nasal spray  Usually does not have sinus congestion sysmptoms usually just pressure  Has seen ENT before       History:  Past Medical History:   Diagnosis Date    Asthma     COVID-19 08/2020    Essential hypertension 04/27/2017    Right knee injury 07/27/2020    Staph aureus infection      No past surgical history on file.  Family History   Problem Relation Age of Onset    Hypertension Maternal Grandmother     Miscarriages / Stillbirths Maternal Grandmother     Hypertension Father     Asthma Father     No Known Problems Mother      Social History     Socioeconomic History    Marital status: Single   Tobacco Use    Smoking status: Never    Smokeless tobacco: Never   Substance and Sexual Activity    Alcohol use: Yes     Alcohol/week: 1.0 standard drink     Types: 1 Glasses of wine per week     Comment: socially    Drug use: No    Sexual activity: Yes     Partners: Male      Birth control/protection: None     Social Determinants of Health     Financial Resource Strain: Low Risk     Difficulty of Paying Living Expenses: Not hard at all   Food Insecurity: No Food Insecurity    Worried About Running Out of Food in the Last Year: Never true    Ran Out of Food in the Last Year: Never true   Transportation Needs: No Transportation Needs    Lack of Transportation (Medical): No    Lack of Transportation (Non-Medical): No   Physical Activity: Insufficiently Active    Days of Exercise per Week: 3 days    Minutes of Exercise per Session: 30 min   Stress: Stress Concern Present    Feeling of Stress : To some extent   Social Connections: Unknown    Frequency of Communication with Friends and Family: More than three times a week    Frequency of Social Gatherings with Friends and Family: Once a week    Active Member of Clubs or Organizations: No    Attends Club or Organization Meetings: Never    Marital Status: Living with partner   Housing Stability: Low Risk     Unable to Pay for Housing in the Last Year: No    Number of Places Lived in the Last Year: 1    Unstable Housing in the Last Year: No     Patient Active Problem List   Diagnosis    Asthma, mild intermittent    Hypertension    Seasonal allergic rhinitis due to pollen    Anxiety     Review of patient's allergies indicates:  No Known Allergies    The following were reviewed at this visit: active problem list, medication list, allergies, family history, social history, and health maintenance.    Medications:  Current Outpatient Medications on File Prior to Visit   Medication Sig Dispense Refill    albuterol (PROVENTIL) 2.5 mg /3 mL (0.083 %) nebulizer solution Take 3 mLs (2.5 mg total) by nebulization every 4 to 6 hours as needed for Wheezing or Shortness of Breath. 250 mL 0    amLODIPine (NORVASC) 5 MG tablet Take 1 tablet (5 mg total) by mouth once daily. 90 tablet 1    ascorbic acid, vitamin C, (VITAMIN C) 500 MG tablet Take 500 mg by  mouth once daily.      diclofenac sodium (VOLTAREN) 1 % Gel Apply 2 g topically 4 (four) times daily. for 10 days 100 g 0    fluticasone-salmeterol diskus inhaler 250-50 mcg Inhale 1 puff into the lungs 2 (two) times daily. Controller 180 each 5    folic acid (FOLVITE) 400 MCG tablet Take 400 mcg by mouth once daily.      montelukast (SINGULAIR) 10 mg tablet Take 1 tablet (10 mg total) by mouth every evening. 90 tablet 3    zinc gluconate 50 mg tablet Take 50 mg by mouth once daily.       No current facility-administered medications on file prior to visit.       Medications have been reviewed and reconciled with patient at this visit.  Barriers to medications reviewed with patient.    Adverse reactions to current medications reviewed with patient..    Over the counter medications reviewed and reconciled with patient.    Exam:  Wt Readings from Last 3 Encounters:   07/15/23 83.9 kg (185 lb)   06/23/23 91.2 kg (201 lb 1 oz)   05/06/23 89.4 kg (196 lb 15.7 oz)     Temp Readings from Last 3 Encounters:   07/15/23 99.2 °F (37.3 °C) (Oral)   05/06/23 98.9 °F (37.2 °C) (Oral)   04/17/23 98 °F (36.7 °C) (Tympanic)     BP Readings from Last 3 Encounters:   07/15/23 (!) 131/100   06/23/23 128/88   05/06/23 (!) 166/104     Pulse Readings from Last 3 Encounters:   07/15/23 85   06/23/23 102   05/06/23 94     There is no height or weight on file to calculate BMI.      Review of Systems   HENT:  Positive for sinus pain.    Neurological:  Positive for headaches.   Physical Exam  Vitals and nursing note reviewed.   Constitutional:       General: She is not in acute distress.     Appearance: She is well-developed. She is not diaphoretic.   HENT:      Head: Normocephalic and atraumatic.      Salivary Glands: Right salivary gland is not diffusely enlarged or tender. Left salivary gland is not diffusely enlarged or tender.      Right Ear: External ear normal.      Left Ear: External ear normal.      Nose:      Right Sinus: Maxillary  sinus tenderness and frontal sinus tenderness present.      Left Sinus: Maxillary sinus tenderness and frontal sinus tenderness present.      Mouth/Throat:      Mouth: Mucous membranes are moist.   Eyes:      General:         Right eye: No discharge.         Left eye: No discharge.      Conjunctiva/sclera: Conjunctivae normal.      Pupils: Pupils are equal, round, and reactive to light.   Neck:      Thyroid: No thyromegaly.   Cardiovascular:      Rate and Rhythm: Normal rate and regular rhythm.      Heart sounds: Normal heart sounds. No murmur heard.  Pulmonary:      Effort: Pulmonary effort is normal. No respiratory distress.      Breath sounds: Normal breath sounds. No wheezing.   Neurological:      Mental Status: She is alert and oriented to person, place, and time.   Psychiatric:         Behavior: Behavior normal.         Thought Content: Thought content normal.         Judgment: Judgment normal.       Laboratory Reviewed ({Yes)  Lab Results   Component Value Date    WBC 6.30 09/11/2015    HGB 14.4 09/11/2015    HCT 41.3 09/11/2015     09/11/2015    CHOL 106 (L) 03/12/2021    TRIG 30 03/12/2021    HDL 42 03/12/2021    ALT 13 03/12/2021    AST 14 03/12/2021     03/12/2021    K 3.9 03/12/2021     03/12/2021    CREATININE 0.8 03/12/2021    BUN 10 03/12/2021    CO2 26 03/12/2021    TSH 2.035 03/12/2021    GLUF 148 (H) 08/29/2018    HGBA1C 4.7 03/12/2021       Smith was seen today for headache and hospital follow up.    Diagnoses and all orders for this visit:    Hypertension, unspecified type  At visit, Blood pressure is at goal. Continue current medications      Neck pain on left side  Pain has resolved    Obesity (BMI 30-39.9)  Encouraged healthy diet and exercise as tolerated to help bring BMI into normal range.      Sinus pressure  -     methylPREDNISolone (MEDROL DOSEPACK) 4 mg tablet; use as directed  -     montelukast (SINGULAIR) 10 mg tablet; Take 1 tablet (10 mg total) by mouth every  evening.    Cont using daily antihistamine and add dose back for sinus pain  If HA returns discussed scheduling a follow up appt       Care Plan/Goals: Reviewed    Goals    None         Follow up: No follow-ups on file.    After visit summary was printed and given to patient upon discharge today.  Patient goals and care plan are included in After Visit Summary.

## 2023-07-24 ENCOUNTER — HOSPITAL ENCOUNTER (EMERGENCY)
Facility: OTHER | Age: 33
Discharge: HOME | End: 2023-07-24
Attending: EMERGENCY MEDICINE
Payer: COMMERCIAL

## 2023-07-24 VITALS
TEMPERATURE: 98 F | DIASTOLIC BLOOD PRESSURE: 99 MMHG | OXYGEN SATURATION: 99 % | SYSTOLIC BLOOD PRESSURE: 149 MMHG | HEART RATE: 85 BPM | RESPIRATION RATE: 16 BRPM | WEIGHT: 180 LBS | BODY MASS INDEX: 29.05 KG/M2

## 2023-07-24 DIAGNOSIS — G44.209 ACUTE NON INTRACTABLE TENSION-TYPE HEADACHE: Primary | ICD-10-CM

## 2023-07-24 DIAGNOSIS — R03.0 ELEVATED BLOOD PRESSURE READING: ICD-10-CM

## 2023-07-24 LAB
B-HCG UR QL: NEGATIVE
CTP QC/QA: YES

## 2023-07-24 PROCEDURE — 36000 PLACE NEEDLE IN VEIN: CPT

## 2023-07-24 PROCEDURE — 99284 EMERGENCY DEPT VISIT MOD MDM: CPT | Mod: 25

## 2023-07-24 PROCEDURE — 93005 ELECTROCARDIOGRAM TRACING: CPT

## 2023-07-24 PROCEDURE — 81025 URINE PREGNANCY TEST: CPT | Performed by: NURSE PRACTITIONER

## 2023-07-24 PROCEDURE — 96372 THER/PROPH/DIAG INJ SC/IM: CPT | Performed by: EMERGENCY MEDICINE

## 2023-07-24 PROCEDURE — 63600175 PHARM REV CODE 636 W HCPCS: Performed by: EMERGENCY MEDICINE

## 2023-07-24 PROCEDURE — 93010 EKG 12-LEAD: ICD-10-PCS | Mod: ,,, | Performed by: INTERNAL MEDICINE

## 2023-07-24 PROCEDURE — 93010 ELECTROCARDIOGRAM REPORT: CPT | Mod: ,,, | Performed by: INTERNAL MEDICINE

## 2023-07-24 RX ORDER — METOCLOPRAMIDE HYDROCHLORIDE 5 MG/ML
10 INJECTION INTRAMUSCULAR; INTRAVENOUS
Status: COMPLETED | OUTPATIENT
Start: 2023-07-24 | End: 2023-07-24

## 2023-07-24 RX ORDER — METOCLOPRAMIDE 10 MG/1
10 TABLET ORAL EVERY 6 HOURS PRN
Qty: 30 TABLET | Refills: 0 | Status: SHIPPED | OUTPATIENT
Start: 2023-07-24 | End: 2023-10-27

## 2023-07-24 RX ORDER — KETOROLAC TROMETHAMINE 30 MG/ML
15 INJECTION, SOLUTION INTRAMUSCULAR; INTRAVENOUS
Status: COMPLETED | OUTPATIENT
Start: 2023-07-24 | End: 2023-07-24

## 2023-07-24 RX ADMIN — KETOROLAC TROMETHAMINE 15 MG: 30 INJECTION, SOLUTION INTRAMUSCULAR; INTRAVENOUS at 08:07

## 2023-07-24 RX ADMIN — METOCLOPRAMIDE 10 MG: 5 INJECTION, SOLUTION INTRAMUSCULAR; INTRAVENOUS at 08:07

## 2023-07-25 NOTE — ED TRIAGE NOTES
45 min onset of left sided headache unrelieved with Naprosyn. States that she currently has a family member in the hospital with a brain aneurysm so she wanted to be sure she was OK. No N/V or fever. Currently on steroid dosepak for sinus pressure. Presents in no distress.

## 2023-07-25 NOTE — ED PROVIDER NOTES
SCRIBE #1 NOTE: I, Jevon Dangelo, am scribing for, and in the presence of,  Donte Brothers DO. I have scribed the following portions of the note - Other sections scribed: HPI, ROS, PE.       Source of History:  Patient    Chief complaint:  Headache (C/O of a headache over the past thirty minutes./Sharp pain located on the left side primarily behind the left eye.//(-)LOC, chest pain, sob, abd pain, dizziness, weakness or nvd at this time)      HPI:  Smith Love is a 32 y.o. female presenting with a persistent, squeezing headache for the past two hours. Patient was visiting her aunt at [facility], where she is hospitalized for an aneurism. She was heading to the cafeteria and turned her head, resulting in the pain. She began to panic and came here as the wait times at the hospital she was in were too long.  She denies this is the worse headache of her life and it came on more gradually and has been intermittent rather than a sudden onset.  The pain improves when she rubs the area. Patient was given Naprosyn by a relative without improvement. She does note that she has a history of headaches and sinus pressure, the latter which she is currently taking steroids for. Her current headache is not more intense than prior, but is more persistent. PMHx of HTN well controlled with amlodipine.    This is the extent to the patients complaints today here in the emergency department.    ROS:   See HPI.    Review of patient's allergies indicates:  No Known Allergies    PMH:  As per HPI and below:  Past Medical History:   Diagnosis Date    Asthma     COVID-19 08/2020    Essential hypertension 04/27/2017    Right knee injury 07/27/2020    Staph aureus infection      No past surgical history on file.    Social History     Tobacco Use    Smoking status: Never    Smokeless tobacco: Never   Substance Use Topics    Alcohol use: Yes     Alcohol/week: 1.0 standard drink     Types: 1 Glasses of wine per week     Comment:  socially    Drug use: No       Physical Exam:    BP (!) 149/99 (BP Location: Left arm, Patient Position: Lying)   Pulse 85   Temp 98 °F (36.7 °C) (Oral)   Resp 16   Wt 81.6 kg (180 lb)   LMP 06/21/2023 (Exact Date)   SpO2 99%   BMI 29.05 kg/m²   Nursing note and vital signs reviewed.  Constitutional: No acute distress.  Nontoxic  Head: Has relief of her headache with light massage of the left temporal area.  Eyes: No conjunctival injection.  Extraocular muscles are intact.  Cardiovascular: Regular rate and rhythm.  No murmurs. No gallops. No rubs.  Respiratory: Clear to auscultation bilaterally.  Good air movement.  No wheezes.  No rhonchi. No rales. No accessory muscle use.  Abdomen: Soft.  Not distended.  Nontender.  No guarding.  No rebound. Non-peritoneal.  Skin: No rashes seen.    Neurologic:  Cranial nerves 2-12 are intact.  All extremities are 5/5 strength.  Motor and sensory intact.  No ataxia.  Negative Romberg test.  No cerebellar findings.  Good finger-to-nose.  No focal neurological deficits.    I decided to obtain the patient's medical records.  Summary of Medical Records:  06/23/2023 reviewed office visit with her internist for hypertension.  At this time the patient had decreased her amlodipine from 10 mg down to 5 mg due to leg swelling.    MDM/ Differential Dx:   I believe the patient has an unspecified headache based upon the history and physical exam.  Likely musculoskeletal as it is relieved with mild massage.  Likely secondary to her sinusitis which he is currently being treated for..  The patient has no focal weakness, numbness, meningismus, other focal neurologic deficit, history of trauma, fevers, elevated blood pressure to suggest meningitis, subarachnoid hemorrhage, TIA, stroke, mass, or hypertensive urgency. I do not feel a CT of the brain or blood work are necessary at this time.        ED Course as of 07/24/23 2140   Mon Jul 24, 2023 2046 EKG 12-lead  EKG independently  interpreted by myself showing normal sinus rhythm at a rate of 93 , normal intervals, normal QRS, no ST, T-wave abnormalities.  Overall impression negative EKG.  Compared to an EKG on 02/01/2022 shows no change. []   2050 BP(!): 182/108  Patient's blood pressure is elevated.  I do suspect this is secondary to her headache as well as stress/anxiety that she has a brain aneurysm similar to her aunt who is currently in the hospital. []   2112 Preg Test, Ur: Negative [SM]   2113 BP(!): 149/99 [SM]   2125 Re-evaluated the patient she is feeling better. [SM]   2140 No further workup is indicated in the emergency department today.  I updated pt regarding results and I counseled pt regarding supportive care measures.  Diagnosis and treatment plan explained to patient. I have answered all questions and the patient is satisfied with the plan of care. Patient discharged home in stable condition.  []      ED Course User Index  [] Donte Brothers, DO              Scribe Attestation:   Scribe #1: I performed the above scribed service and the documentation accurately describes the services I performed. I attest to the accuracy of the note.  Physician Attestation for Scribe: I, Dr Donte Brothers, reviewed documentation as scribed in my presence, which is both accurate and complete.    Diagnostic Impression:    1. Acute non intractable tension-type headache    2. Elevated blood pressure reading         ED Disposition Condition    Discharge Stable            ED Prescriptions       Medication Sig Dispense Start Date End Date Auth. Provider    metoclopramide HCl (REGLAN) 10 MG tablet Take 1 tablet (10 mg total) by mouth every 6 (six) hours as needed (headache). 30 tablet 7/24/2023 -- Donte Brothers, DO          Follow-up Information       Follow up With Specialties Details Why Contact Info    Stephanie Schwartz MD Internal Medicine In 1 day at your appointment [address] [place] [address] DR Bonny CARDENAS  [id number]  [phone]               Donte Brothers, DO  07/24/23 2140

## 2023-07-25 NOTE — FIRST PROVIDER EVALUATION
" Emergency Department TeleTriage Encounter Note      CHIEF COMPLAINT    Chief Complaint   Patient presents with    Headache     C/O of a headache over the past thirty minutes.  Sharp pain located on the left side primarily behind the left eye.    (-)LOC, chest pain, sob, abd pain, dizziness, weakness or nvd at this time       VITAL SIGNS   Initial Vitals [07/24/23 1917]   BP Pulse Resp Temp SpO2   (!) 179/108 85 17 97.9 °F (36.6 °C) 98 %      MAP       --            ALLERGIES    Review of patient's allergies indicates:  No Known Allergies    PROVIDER TRIAGE NOTE  This is a teletriage evaluation of a 32 y.o. female presenting to the ED complaining of left-sided headache starting about 30 minutes prior to arrival today. Denies weakness, numbness/tingling, visual changes, and vomiting. States that she was recently started on steroids for "sinus issues."  No previous similar headaches.     BP elevated. PMhx of HTN, reports compliance with medications. Will obtain EKG deto elevated BP. Pt alert, oriented, no obvious focal neuro findings.     Initial orders will be placed and care will be transferred to an alternate provider when patient is roomed for a full evaluation. Any additional orders and the final disposition will be determined by that provider.         ORDERS  Labs Reviewed - No data to display    ED Orders (720h ago, onward)      Start Ordered     Status Ordering Provider    Unscheduled 07/24/23 1934  POCT urine pregnancy  Once         Ordered SANCHO MCGREGOR              Virtual Visit Note: The provider triage portion of this emergency department evaluation and documentation was performed via [employer], a HIPAA-compliant telemedicine application, in concert with a tele-presenter in the room. A face to face patient evaluation with one of my colleagues will occur once the patient is placed in an emergency department room.      DISCLAIMER: This note was prepared with M*Modal voice recognition " transcription software. Garbled syntax, mangled pronouns, and other bizarre constructions may be attributed to that software system.

## 2023-08-04 ENCOUNTER — OFFICE VISIT (OUTPATIENT)
Dept: FAMILY MEDICINE | Facility: CLINIC | Age: 33
End: 2023-08-04
Payer: COMMERCIAL

## 2023-08-04 ENCOUNTER — HOSPITAL ENCOUNTER (OUTPATIENT)
Dept: RADIOLOGY | Facility: HOSPITAL | Age: 33
Discharge: HOME | End: 2023-08-04
Attending: NURSE PRACTITIONER
Payer: COMMERCIAL

## 2023-08-04 VITALS
RESPIRATION RATE: 18 BRPM | WEIGHT: 200.94 LBS | DIASTOLIC BLOOD PRESSURE: 86 MMHG | HEART RATE: 70 BPM | BODY MASS INDEX: 32.29 KG/M2 | HEIGHT: 66 IN | OXYGEN SATURATION: 97 % | SYSTOLIC BLOOD PRESSURE: 118 MMHG

## 2023-08-04 DIAGNOSIS — J32.9 SINUSITIS, UNSPECIFIED CHRONICITY, UNSPECIFIED LOCATION: Primary | ICD-10-CM

## 2023-08-04 DIAGNOSIS — J32.9 SINUSITIS, UNSPECIFIED CHRONICITY, UNSPECIFIED LOCATION: ICD-10-CM

## 2023-08-04 PROCEDURE — 70220 X-RAY EXAM OF SINUSES: CPT | Mod: TC,FY,PO

## 2023-08-04 PROCEDURE — 99213 PR OFFICE/OUTPT VISIT, EST, LEVL III, 20-29 MIN: ICD-10-PCS | Mod: S$GLB,,, | Performed by: NURSE PRACTITIONER

## 2023-08-04 PROCEDURE — 1159F PR MEDICATION LIST DOCUMENTED IN MEDICAL RECORD: ICD-10-PCS | Mod: CPTII,S$GLB,, | Performed by: NURSE PRACTITIONER

## 2023-08-04 PROCEDURE — 99999 PR PBB SHADOW E&M-EST. PATIENT-LVL V: ICD-10-PCS | Mod: PBBFAC,,, | Performed by: NURSE PRACTITIONER

## 2023-08-04 PROCEDURE — 1160F RVW MEDS BY RX/DR IN RCRD: CPT | Mod: CPTII,S$GLB,, | Performed by: NURSE PRACTITIONER

## 2023-08-04 PROCEDURE — 70220 XR SINUSES MIN 3 VIEWS: ICD-10-PCS | Mod: 26,,, | Performed by: RADIOLOGY

## 2023-08-04 PROCEDURE — 70220 X-RAY EXAM OF SINUSES: CPT | Mod: 26,,, | Performed by: RADIOLOGY

## 2023-08-04 PROCEDURE — 99213 OFFICE O/P EST LOW 20 MIN: CPT | Mod: S$GLB,,, | Performed by: NURSE PRACTITIONER

## 2023-08-04 PROCEDURE — 1160F PR REVIEW ALL MEDS BY PRESCRIBER/CLIN PHARMACIST DOCUMENTED: ICD-10-PCS | Mod: CPTII,S$GLB,, | Performed by: NURSE PRACTITIONER

## 2023-08-04 PROCEDURE — 1159F MED LIST DOCD IN RCRD: CPT | Mod: CPTII,S$GLB,, | Performed by: NURSE PRACTITIONER

## 2023-08-04 PROCEDURE — 3079F PR MOST RECENT DIASTOLIC BLOOD PRESSURE 80-89 MM HG: ICD-10-PCS | Mod: CPTII,S$GLB,, | Performed by: NURSE PRACTITIONER

## 2023-08-04 PROCEDURE — 3074F PR MOST RECENT SYSTOLIC BLOOD PRESSURE < 130 MM HG: ICD-10-PCS | Mod: CPTII,S$GLB,, | Performed by: NURSE PRACTITIONER

## 2023-08-04 PROCEDURE — 3008F PR BODY MASS INDEX (BMI) DOCUMENTED: ICD-10-PCS | Mod: CPTII,S$GLB,, | Performed by: NURSE PRACTITIONER

## 2023-08-04 PROCEDURE — 3008F BODY MASS INDEX DOCD: CPT | Mod: CPTII,S$GLB,, | Performed by: NURSE PRACTITIONER

## 2023-08-04 PROCEDURE — 3074F SYST BP LT 130 MM HG: CPT | Mod: CPTII,S$GLB,, | Performed by: NURSE PRACTITIONER

## 2023-08-04 PROCEDURE — 3079F DIAST BP 80-89 MM HG: CPT | Mod: CPTII,S$GLB,, | Performed by: NURSE PRACTITIONER

## 2023-08-04 PROCEDURE — 99999 PR PBB SHADOW E&M-EST. PATIENT-LVL V: CPT | Mod: PBBFAC,,, | Performed by: NURSE PRACTITIONER

## 2023-08-04 RX ORDER — DEXBROMPHENIRAMINE MALEATE, PHENYLEPHRINE HYDROCHLORIDE 2; 7.5 MG/1; MG/1
1 TABLET ORAL EVERY 6 HOURS PRN
Qty: 30 TABLET | Refills: 2 | Status: SHIPPED | OUTPATIENT
Start: 2023-08-04 | End: 2023-10-27

## 2023-08-04 NOTE — PROGRESS NOTES
Smith Love  08/14/2023  [id number]    Caro Hernandez MD  Patient Care Team:  Caro Hernandez MD as PCP - General (Family Medicine)  Arlet Mcclelland MD as PCP - Family Medicine (Family Medicine)  Vannessa Dempsey NP as Nurse Practitioner (Family Medicine)          Visit Type:an urgent visit for a new problem    Chief Complaint:  Chief Complaint   Patient presents with    Sinus Problem       History of Present Illness:    32 year old female presents today to establish care with Dr. Hernandez.  Patient reports she has chronic sinusitis and was evaluated by ENT.  Her last ENT appointment x-rays of her sinuses were ordered but  due to her hair the patient was told her hair would look like worms and she was unable to take any images.  Patient is requesting an x-ray of her sinuses. She would like to send the report to her ENT for further diagnosis and treatment.   Denies  ear pain, fever, cough, chills, body aches, chest pain, nausea, vomiting, diarrhea, abdominal pain, weakness, shortness of breath, wheezing.   History:  Past Medical History:   Diagnosis Date    Asthma     COVID-19 08/2020    Essential hypertension 04/27/2017    Right knee injury 07/27/2020    Staph aureus infection      History reviewed. No pertinent surgical history.  Family History   Problem Relation Age of Onset    Hypertension Maternal Grandmother     Miscarriages / Stillbirths Maternal Grandmother     Hypertension Father     Asthma Father     No Known Problems Mother      Social History     Socioeconomic History    Marital status: Single   Tobacco Use    Smoking status: Never    Smokeless tobacco: Never   Substance and Sexual Activity    Alcohol use: Yes     Alcohol/week: 1.0 standard drink of alcohol     Types: 1 Glasses of wine per week     Comment: socially    Drug use: No    Sexual activity: Yes     Partners: Male     Birth control/protection: None     Social Determinants of Health     Financial Resource Strain: Low Risk   (4/3/2023)    Overall Financial Resource Strain (CARDIA)     Difficulty of Paying Living Expenses: Not hard at all   Food Insecurity: No Food Insecurity (4/3/2023)    Hunger Vital Sign     Worried About Running Out of Food in the Last Year: Never true     Ran Out of Food in the Last Year: Never true   Transportation Needs: No Transportation Needs (4/3/2023)    PRAPARE - Transportation     Lack of Transportation (Medical): No     Lack of Transportation (Non-Medical): No   Physical Activity: Insufficiently Active (4/3/2023)    Exercise Vital Sign     Days of Exercise per Week: 3 days     Minutes of Exercise per Session: 30 min   Stress: Stress Concern Present (4/3/2023)    [employer] [place] [employer] - Occupational Stress Questionnaire     Feeling of Stress : To some extent   Social Connections: Unknown (4/3/2023)    Social Connection and Isolation Panel [NHANES]     Frequency of Communication with Friends and Family: More than three times a week     Frequency of Social Gatherings with Friends and Family: Once a week     Active Member of Clubs or Organizations: No     Attends Club or Organization Meetings: Never     Marital Status: Living with partner   Housing Stability: Low Risk  (4/3/2023)    Housing Stability Vital Sign     Unable to Pay for Housing in the Last Year: No     Number of Places Lived in the Last Year: 1     Unstable Housing in the Last Year: No     Patient Active Problem List   Diagnosis    Asthma, mild intermittent    Hypertension    Seasonal allergic rhinitis due to pollen    Anxiety     Review of patient's allergies indicates:  No Known Allergies    The following were reviewed at this visit: active problem list, medication list, allergies, family history, social history, and health maintenance.    Medications:  Current Outpatient Medications on File Prior to Visit   Medication Sig Dispense Refill    albuterol (PROVENTIL) 2.5 mg /3 mL (0.083 %) nebulizer solution Take 3 mLs (2.5 mg  total) by nebulization every 4 to 6 hours as needed for Wheezing or Shortness of Breath. 250 mL 0    amLODIPine (NORVASC) 5 MG tablet Take 1 tablet (5 mg total) by mouth once daily. 90 tablet 1    ascorbic acid, vitamin C, (VITAMIN C) 500 MG tablet Take 500 mg by mouth once daily.      fluticasone-salmeterol diskus inhaler 250-50 mcg Inhale 1 puff into the lungs 2 (two) times daily. Controller 180 each 5    folic acid (FOLVITE) 400 MCG tablet Take 400 mcg by mouth once daily.      metoclopramide HCl (REGLAN) 10 MG tablet Take 1 tablet (10 mg total) by mouth every 6 (six) hours as needed (headache). 30 tablet 0    montelukast (SINGULAIR) 10 mg tablet Take 1 tablet (10 mg total) by mouth every evening. 90 tablet 3    zinc gluconate 50 mg tablet Take 50 mg by mouth once daily.      diclofenac sodium (VOLTAREN) 1 % Gel Apply 2 g topically 4 (four) times daily. for 10 days 100 g 0     No current facility-administered medications on file prior to visit.       Medications have been reviewed and reconciled with patient at this visit.  Barriers to medications reviewed with patient.    Adverse reactions to current medications reviewed with patient..    Over the counter medications reviewed and reconciled with patient.    Exam:  Wt Readings from Last 3 Encounters:   08/04/23 91.2 kg (200 lb 15.2 oz)   07/24/23 81.6 kg (180 lb)   07/18/23 87.3 kg (192 lb 7.4 oz)     Temp Readings from Last 3 Encounters:   07/24/23 98 °F (36.7 °C) (Oral)   07/18/23 97.8 °F (36.6 °C) (Tympanic)   07/15/23 99.2 °F (37.3 °C) (Oral)     BP Readings from Last 3 Encounters:   08/04/23 118/86   07/24/23 (!) 149/99   07/18/23 132/86     Pulse Readings from Last 3 Encounters:   08/04/23 70   07/24/23 85   07/18/23 83     Body mass index is 32.43 kg/m².      Review of Systems   Constitutional:  Negative for chills, fever and malaise/fatigue.   HENT:  Positive for congestion and sinus pain. Negative for ear pain and sore throat.    Respiratory:  Negative  for cough, sputum production, shortness of breath and wheezing.    Cardiovascular:  Negative for chest pain and palpitations.   Gastrointestinal:  Negative for nausea.   Neurological:  Negative for speech change, weakness and headaches.   All other systems reviewed and are negative.    Physical Exam  Vitals and nursing note reviewed.   Constitutional:       Appearance: Normal appearance. She is obese.   HENT:      Head: Normocephalic and atraumatic.      Right Ear: Tympanic membrane, ear canal and external ear normal.      Left Ear: Tympanic membrane, ear canal and external ear normal.      Nose: Nose normal.      Mouth/Throat:      Mouth: Mucous membranes are moist.      Pharynx: Oropharynx is clear.   Eyes:      Extraocular Movements: Extraocular movements intact.      Conjunctiva/sclera: Conjunctivae normal.      Pupils: Pupils are equal, round, and reactive to light.   Cardiovascular:      Rate and Rhythm: Normal rate and regular rhythm.      Pulses: Normal pulses.      Heart sounds: Normal heart sounds.   Pulmonary:      Effort: Pulmonary effort is normal.      Breath sounds: Normal breath sounds.   Abdominal:      General: Bowel sounds are normal.      Palpations: Abdomen is soft.   Musculoskeletal:         General: Normal range of motion.      Cervical back: Normal range of motion and neck supple.   Skin:     General: Skin is warm and dry.      Capillary Refill: Capillary refill takes less than 2 seconds.   Neurological:      General: No focal deficit present.      Mental Status: She is alert and oriented to person, place, and time.   Psychiatric:         Mood and Affect: Mood normal.         Behavior: Behavior normal.         Thought Content: Thought content normal.         Judgment: Judgment normal.         Laboratory Reviewed ({Yes)  Lab Results   Component Value Date    WBC 6.30 09/11/2015    HGB 14.4 09/11/2015    HCT 41.3 09/11/2015     09/11/2015    CHOL 106 (L) 03/12/2021    TRIG 30 03/12/2021     HDL 42 03/12/2021    ALT 13 03/12/2021    AST 14 03/12/2021     03/12/2021    K 3.9 03/12/2021     03/12/2021    CREATININE 0.8 03/12/2021    BUN 10 03/12/2021    CO2 26 03/12/2021    TSH 2.035 03/12/2021    GLUF 148 (H) 08/29/2018    HGBA1C 4.7 03/12/2021       Smith was seen today for sinus problem.    Diagnoses and all orders for this visit:    Sinusitis, unspecified chronicity, unspecified location  -     dexbrompheniramine-phenyleph (ALAHIST PE) 2-7.5 mg Tab; Take 1 tablet by mouth every 6 (six) hours as needed (sinus congestion).  -     X-Ray Sinuses 3 or more views; Future      Start alahist     Care Plan/Goals: Reviewed    Goals    None     Smtih was seen today for sinus problem.    Diagnoses and all orders for this visit:    Sinusitis, unspecified chronicity, unspecified location  -     dexbrompheniramine-phenyleph (ALAHIST PE) 2-7.5 mg Tab; Take 1 tablet by mouth every 6 (six) hours as needed (sinus congestion).  -     X-Ray Sinuses 3 or more views; Future         Follow up: Follow up for with ENT for evaluation and treatment.    After visit summary was printed and given to patient upon discharge today.  Patient goals and care plan are included in After Visit Summary.

## 2023-09-08 ENCOUNTER — OFFICE VISIT (OUTPATIENT)
Dept: FAMILY MEDICINE | Facility: CLINIC | Age: 33
End: 2023-09-08
Payer: COMMERCIAL

## 2023-09-08 VITALS
HEART RATE: 98 BPM | DIASTOLIC BLOOD PRESSURE: 84 MMHG | WEIGHT: 204.38 LBS | OXYGEN SATURATION: 98 % | BODY MASS INDEX: 32.85 KG/M2 | HEIGHT: 66 IN | RESPIRATION RATE: 18 BRPM | SYSTOLIC BLOOD PRESSURE: 128 MMHG | TEMPERATURE: 98 F

## 2023-09-08 DIAGNOSIS — N64.4 BREAST PAIN, LEFT: Primary | ICD-10-CM

## 2023-09-08 PROCEDURE — 1159F MED LIST DOCD IN RCRD: CPT | Mod: CPTII,S$GLB,, | Performed by: NURSE PRACTITIONER

## 2023-09-08 PROCEDURE — 3079F DIAST BP 80-89 MM HG: CPT | Mod: CPTII,S$GLB,, | Performed by: NURSE PRACTITIONER

## 2023-09-08 PROCEDURE — 3008F PR BODY MASS INDEX (BMI) DOCUMENTED: ICD-10-PCS | Mod: CPTII,S$GLB,, | Performed by: NURSE PRACTITIONER

## 2023-09-08 PROCEDURE — 3074F SYST BP LT 130 MM HG: CPT | Mod: CPTII,S$GLB,, | Performed by: NURSE PRACTITIONER

## 2023-09-08 PROCEDURE — 99214 OFFICE O/P EST MOD 30 MIN: CPT | Mod: S$GLB,,, | Performed by: NURSE PRACTITIONER

## 2023-09-08 PROCEDURE — 1159F PR MEDICATION LIST DOCUMENTED IN MEDICAL RECORD: ICD-10-PCS | Mod: CPTII,S$GLB,, | Performed by: NURSE PRACTITIONER

## 2023-09-08 PROCEDURE — 3074F PR MOST RECENT SYSTOLIC BLOOD PRESSURE < 130 MM HG: ICD-10-PCS | Mod: CPTII,S$GLB,, | Performed by: NURSE PRACTITIONER

## 2023-09-08 PROCEDURE — 1160F RVW MEDS BY RX/DR IN RCRD: CPT | Mod: CPTII,S$GLB,, | Performed by: NURSE PRACTITIONER

## 2023-09-08 PROCEDURE — 99999 PR PBB SHADOW E&M-EST. PATIENT-LVL V: ICD-10-PCS | Mod: PBBFAC,,, | Performed by: NURSE PRACTITIONER

## 2023-09-08 PROCEDURE — 3079F PR MOST RECENT DIASTOLIC BLOOD PRESSURE 80-89 MM HG: ICD-10-PCS | Mod: CPTII,S$GLB,, | Performed by: NURSE PRACTITIONER

## 2023-09-08 PROCEDURE — 3008F BODY MASS INDEX DOCD: CPT | Mod: CPTII,S$GLB,, | Performed by: NURSE PRACTITIONER

## 2023-09-08 PROCEDURE — 99214 PR OFFICE/OUTPT VISIT, EST, LEVL IV, 30-39 MIN: ICD-10-PCS | Mod: S$GLB,,, | Performed by: NURSE PRACTITIONER

## 2023-09-08 PROCEDURE — 1160F PR REVIEW ALL MEDS BY PRESCRIBER/CLIN PHARMACIST DOCUMENTED: ICD-10-PCS | Mod: CPTII,S$GLB,, | Performed by: NURSE PRACTITIONER

## 2023-09-08 PROCEDURE — 99999 PR PBB SHADOW E&M-EST. PATIENT-LVL V: CPT | Mod: PBBFAC,,, | Performed by: NURSE PRACTITIONER

## 2023-09-08 NOTE — PROGRESS NOTES
Smith Love  09/08/2023  [id number]    Caro Hernandez MD  Patient Care Team:  Caro Hernandez MD as PCP - General (Family Medicine)  Arlet Mcclelland MD as PCP - Family Medicine (Family Medicine)  Vannessa Dempsey NP as Nurse Practitioner (Family Medicine)          Visit Type:  an urgent visit for a new problem  Chief Complaint:  Chief Complaint   Patient presents with    Breast Pain     X 1 week       History of Present Illness:    32 year old female presents today with a complaint of intense left breast pain for the last 3 days.  Patient states her cycle started today but the pain is usually in both breasts.  Patient states she does not have a family history of breast cancer but is very concerned because her friend just got diagnosed with breast cancer at 32.    History:  Past Medical History:   Diagnosis Date    Asthma     COVID-19 08/2020    Essential hypertension 04/27/2017    Right knee injury 07/27/2020    Staph aureus infection      History reviewed. No pertinent surgical history.  Family History   Problem Relation Age of Onset    Hypertension Maternal Grandmother     Miscarriages / Stillbirths Maternal Grandmother     Hypertension Father     Asthma Father     No Known Problems Mother      Social History     Socioeconomic History    Marital status: Single   Tobacco Use    Smoking status: Never    Smokeless tobacco: Never   Substance and Sexual Activity    Alcohol use: Yes     Alcohol/week: 1.0 standard drink of alcohol     Types: 1 Glasses of wine per week     Comment: socially    Drug use: No    Sexual activity: Yes     Partners: Male     Birth control/protection: None     Social Determinants of Health     Financial Resource Strain: Low Risk  (4/3/2023)    Overall Financial Resource Strain (CARDIA)     Difficulty of Paying Living Expenses: Not hard at all   Food Insecurity: No Food Insecurity (4/3/2023)    Hunger Vital Sign     Worried About Running Out of Food in the Last Year: Never  true     Ran Out of Food in the Last Year: Never true   Transportation Needs: No Transportation Needs (4/3/2023)    PRAPARE - Transportation     Lack of Transportation (Medical): No     Lack of Transportation (Non-Medical): No   Physical Activity: Insufficiently Active (4/3/2023)    Exercise Vital Sign     Days of Exercise per Week: 3 days     Minutes of Exercise per Session: 30 min   Stress: Stress Concern Present (4/3/2023)    [employer] [employer] - Occupational Stress Questionnaire     Feeling of Stress : To some extent   Social Connections: Unknown (4/3/2023)    Social Connection and Isolation Panel [NHANES]     Frequency of Communication with Friends and Family: More than three times a week     Frequency of Social Gatherings with Friends and Family: Once a week     Active Member of Clubs or Organizations: No     Attends Club or Organization Meetings: Never     Marital Status: Living with partner   Housing Stability: Low Risk  (4/3/2023)    Housing Stability Vital Sign     Unable to Pay for Housing in the Last Year: No     Number of Places Lived in the Last Year: 1     Unstable Housing in the Last Year: No     Patient Active Problem List   Diagnosis    Asthma, mild intermittent    Hypertension    Seasonal allergic rhinitis due to pollen    Anxiety     Review of patient's allergies indicates:  No Known Allergies    The following were reviewed at this visit: active problem list, medication list, allergies, family history, social history, and health maintenance.    Medications:  Current Outpatient Medications on File Prior to Visit   Medication Sig Dispense Refill    albuterol (PROVENTIL) 2.5 mg /3 mL (0.083 %) nebulizer solution Take 3 mLs (2.5 mg total) by nebulization every 4 to 6 hours as needed for Wheezing or Shortness of Breath. 250 mL 0    amLODIPine (NORVASC) 5 MG tablet Take 1 tablet (5 mg total) by mouth once daily. 90 tablet 1    ascorbic acid, vitamin C, (VITAMIN C) 500 MG tablet  Take 500 mg by mouth once daily.      dexbrompheniramine-phenyleph (ALAHIST PE) 2-7.5 mg Tab Take 1 tablet by mouth every 6 (six) hours as needed (sinus congestion). 30 tablet 2    diclofenac sodium (VOLTAREN) 1 % Gel Apply 2 g topically 4 (four) times daily. for 10 days 100 g 0    fluticasone-salmeterol diskus inhaler 250-50 mcg Inhale 1 puff into the lungs 2 (two) times daily. Controller 180 each 5    montelukast (SINGULAIR) 10 mg tablet Take 1 tablet (10 mg total) by mouth every evening. 90 tablet 3    zinc gluconate 50 mg tablet Take 50 mg by mouth once daily.      folic acid (FOLVITE) 400 MCG tablet Take 400 mcg by mouth once daily.      metoclopramide HCl (REGLAN) 10 MG tablet Take 1 tablet (10 mg total) by mouth every 6 (six) hours as needed (headache). (Patient not taking: Reported on 9/8/2023) 30 tablet 0     No current facility-administered medications on file prior to visit.       Medications have been reviewed and reconciled with patient at this visit.  Barriers to medications reviewed with patient.    Adverse reactions to current medications reviewed with patient..    Over the counter medications reviewed and reconciled with patient.    Exam:  Wt Readings from Last 3 Encounters:   09/08/23 92.7 kg (204 lb 5.9 oz)   08/04/23 91.2 kg (200 lb 15.2 oz)   07/24/23 81.6 kg (180 lb)     Temp Readings from Last 3 Encounters:   09/08/23 97.8 °F (36.6 °C) (Tympanic)   07/24/23 98 °F (36.7 °C) (Oral)   07/18/23 97.8 °F (36.6 °C) (Tympanic)     BP Readings from Last 3 Encounters:   09/08/23 128/84   08/04/23 118/86   07/24/23 (!) 149/99     Pulse Readings from Last 3 Encounters:   09/08/23 98   08/04/23 70   07/24/23 85     Body mass index is 32.99 kg/m².      Review of Systems   Constitutional:  Negative for fever.   Respiratory:  Negative for cough, shortness of breath and wheezing.    Cardiovascular:  Negative for chest pain and palpitations.   Gastrointestinal:  Negative for nausea.   Neurological:  Negative  for speech change, weakness and headaches.   All other systems reviewed and are negative.    Physical Exam  Vitals and nursing note reviewed.   Constitutional:       Appearance: Normal appearance. She is obese.   HENT:      Head: Normocephalic and atraumatic.      Right Ear: Tympanic membrane, ear canal and external ear normal.      Left Ear: Tympanic membrane, ear canal and external ear normal.      Nose: Nose normal.      Mouth/Throat:      Mouth: Mucous membranes are moist.      Pharynx: Oropharynx is clear.   Eyes:      Extraocular Movements: Extraocular movements intact.      Conjunctiva/sclera: Conjunctivae normal.      Pupils: Pupils are equal, round, and reactive to light.   Cardiovascular:      Rate and Rhythm: Normal rate and regular rhythm.      Pulses: Normal pulses.      Heart sounds: Normal heart sounds.   Pulmonary:      Effort: Pulmonary effort is normal.      Breath sounds: Normal breath sounds.   Chest:       Abdominal:      General: Bowel sounds are normal.      Palpations: Abdomen is soft.   Musculoskeletal:         General: Normal range of motion.      Cervical back: Normal range of motion and neck supple.   Skin:     General: Skin is warm and dry.      Capillary Refill: Capillary refill takes less than 2 seconds.   Neurological:      General: No focal deficit present.      Mental Status: She is alert and oriented to person, place, and time.   Psychiatric:         Mood and Affect: Mood normal.         Behavior: Behavior normal.         Thought Content: Thought content normal.         Judgment: Judgment normal.         Laboratory Reviewed ({Yes)  Lab Results   Component Value Date    WBC 6.30 09/11/2015    HGB 14.4 09/11/2015    HCT 41.3 09/11/2015     09/11/2015    CHOL 106 (L) 03/12/2021    TRIG 30 03/12/2021    HDL 42 03/12/2021    ALT 13 03/12/2021    AST 14 03/12/2021     03/12/2021    K 3.9 03/12/2021     03/12/2021    CREATININE 0.8 03/12/2021    BUN 10 03/12/2021    CO2  26 03/12/2021    TSH 2.035 03/12/2021    GLUF 148 (H) 08/29/2018    HGBA1C 4.7 03/12/2021       Smith was seen today for breast pain.    Diagnoses and all orders for this visit:    Breast pain, left  -     Mammo Digital Screening Bilat w/ Jose E; Future    Perform routine self breast exams    The current medical regimen is effective;  continue present plan and medications.       Care Plan/Goals: Reviewed    Goals    None       Smith was seen today for breast pain.    Diagnoses and all orders for this visit:    Breast pain, left  -     Mammo Digital Screening Bilat w/ Jose E; Future       Follow up: Follow up in about 3 months (around 12/8/2023) for with scarlett ESPINOSA in 3 months.    After visit summary was printed and given to patient upon discharge today.  Patient goals and care plan are included in After Visit Summary.

## 2023-09-22 ENCOUNTER — OFFICE VISIT (OUTPATIENT)
Dept: URGENT CARE | Facility: CLINIC | Age: 33
End: 2023-09-22
Payer: COMMERCIAL

## 2023-09-22 VITALS
WEIGHT: 204 LBS | BODY MASS INDEX: 32.78 KG/M2 | RESPIRATION RATE: 18 BRPM | SYSTOLIC BLOOD PRESSURE: 122 MMHG | TEMPERATURE: 98 F | DIASTOLIC BLOOD PRESSURE: 84 MMHG | OXYGEN SATURATION: 100 % | HEART RATE: 81 BPM | HEIGHT: 66 IN

## 2023-09-22 DIAGNOSIS — R06.2 WHEEZING: ICD-10-CM

## 2023-09-22 DIAGNOSIS — J01.41 ACUTE RECURRENT PANSINUSITIS: Primary | ICD-10-CM

## 2023-09-22 DIAGNOSIS — Z20.822 EXPOSURE TO COVID-19 VIRUS: ICD-10-CM

## 2023-09-22 LAB
CTP QC/QA: YES
SARS-COV-2 AG RESP QL IA.RAPID: NEGATIVE

## 2023-09-22 PROCEDURE — 99214 OFFICE O/P EST MOD 30 MIN: CPT | Mod: S$GLB,,, | Performed by: NURSE PRACTITIONER

## 2023-09-22 PROCEDURE — 99214 PR OFFICE/OUTPT VISIT, EST, LEVL IV, 30-39 MIN: ICD-10-PCS | Mod: S$GLB,,, | Performed by: NURSE PRACTITIONER

## 2023-09-22 PROCEDURE — 87811 SARS-COV-2 COVID19 W/OPTIC: CPT | Mod: QW,S$GLB,, | Performed by: NURSE PRACTITIONER

## 2023-09-22 PROCEDURE — 87811 SARS CORONAVIRUS 2 ANTIGEN POCT, MANUAL READ: ICD-10-PCS | Mod: QW,S$GLB,, | Performed by: NURSE PRACTITIONER

## 2023-09-22 RX ORDER — AZELASTINE 1 MG/ML
1 SPRAY, METERED NASAL 2 TIMES DAILY
Qty: 30 ML | Refills: 2 | Status: SHIPPED | OUTPATIENT
Start: 2023-09-22

## 2023-09-22 RX ORDER — KETOROLAC TROMETHAMINE 30 MG/ML
30 INJECTION, SOLUTION INTRAMUSCULAR; INTRAVENOUS
Status: DISCONTINUED | OUTPATIENT
Start: 2023-09-22 | End: 2023-09-22

## 2023-09-22 RX ORDER — CEFDINIR 300 MG/1
300 CAPSULE ORAL 2 TIMES DAILY
Qty: 14 CAPSULE | Refills: 0 | Status: SHIPPED | OUTPATIENT
Start: 2023-09-22 | End: 2023-09-29

## 2023-09-22 RX ORDER — ALBUTEROL SULFATE 90 UG/1
2 AEROSOL, METERED RESPIRATORY (INHALATION) EVERY 6 HOURS PRN
Qty: 18 G | Refills: 1 | Status: SHIPPED | OUTPATIENT
Start: 2023-09-22 | End: 2023-10-18 | Stop reason: SDUPTHER

## 2023-09-22 NOTE — PROGRESS NOTES
"Subjective:      Patient ID: Smith Love is a 32 y.o. female.    Vitals:  height is 5' 6" (1.676 m) and weight is 92.5 kg (204 lb). Her oral temperature is 98.1 °F (36.7 °C). Her blood pressure is 122/84 and her pulse is 81. Her respiration is 18 and oxygen saturation is 100%.     Chief Complaint: Headache    Patient presents to clinic with headache onset 4-5 days ago. Patient states it a tension headache and she has had them in the past. Facial pain, frontal pain.     Headache   This is a new problem. The current episode started in the past 7 days. The pain is located in the Frontal and temporal region. The pain radiates to the face. The quality of the pain is described as squeezing and band-like. Associated symptoms include sinus pressure, a sore throat and tingling. Pertinent negatives include no nausea.       HENT:  Positive for congestion, sinus pain, sinus pressure and sore throat.    Gastrointestinal:  Negative for nausea.   Neurological:  Positive for headaches.      Objective:     Physical Exam   Constitutional: She is oriented to person, place, and time. She appears well-developed. She is cooperative.  Non-toxic appearance. She does not appear ill. No distress.   HENT:   Head: Normocephalic and atraumatic.   Ears:   Right Ear: Hearing, external ear and ear canal normal. Tympanic membrane is injected and retracted.   Left Ear: Hearing, external ear and ear canal normal. Tympanic membrane is injected and retracted.   Nose: Congestion present. No mucosal edema, rhinorrhea or nasal deformity. No epistaxis. Right sinus exhibits frontal sinus tenderness. Right sinus exhibits no maxillary sinus tenderness. Left sinus exhibits frontal sinus tenderness. Left sinus exhibits no maxillary sinus tenderness.   Mouth/Throat: Uvula is midline and mucous membranes are normal. Mucous membranes are moist. No trismus in the jaw. Normal dentition. No uvula swelling. Posterior oropharyngeal erythema present. No " oropharyngeal exudate or posterior oropharyngeal edema.   Eyes: Conjunctivae and lids are normal. Pupils are equal, round, and reactive to light. No scleral icterus. Extraocular movement intact   Neck: Trachea normal and phonation normal. Neck supple. No edema present. No erythema present. No neck rigidity present.   Cardiovascular: Normal rate, regular rhythm, normal heart sounds and normal pulses.   Pulmonary/Chest: Effort normal and breath sounds normal. No respiratory distress. She has no decreased breath sounds. She has no rhonchi.   Abdominal: Normal appearance.   Musculoskeletal: Normal range of motion.         General: No deformity. Normal range of motion.   Lymphadenopathy:     She has cervical adenopathy.   Neurological: She is alert and oriented to person, place, and time. She exhibits normal muscle tone. Coordination normal.   Skin: Skin is warm, dry, intact, not diaphoretic and not pale.   Psychiatric: Her speech is normal and behavior is normal. Judgment and thought content normal.   Nursing note and vitals reviewed.      Assessment:     1. Acute recurrent pansinusitis    2. Exposure to COVID-19 virus    3. Wheezing      Results for orders placed or performed in visit on 09/22/23   SARS Coronavirus 2 Antigen, POCT Manual Read   Result Value Ref Range    SARS Coronavirus 2 Antigen Negative Negative     Acceptable Yes        Plan:   Patient stable for discharge and home management of condition      Acute recurrent pansinusitis  -     Discontinue: ketorolac injection 30 mg  -     cefdinir (OMNICEF) 300 MG capsule; Take 1 capsule (300 mg total) by mouth 2 (two) times daily. Take with food for 7 days  Dispense: 14 capsule; Refill: 0  -     azelastine (ASTELIN) 137 mcg (0.1 %) nasal spray; 1 spray (137 mcg total) by Nasal route 2 (two) times daily.  Dispense: 30 mL; Refill: 2    Exposure to COVID-19 virus  -     SARS Coronavirus 2 Antigen, POCT Manual Read    Wheezing  -     albuterol  (VENTOLIN HFA) 90 mcg/actuation inhaler; Inhale 2 puffs into the lungs every 6 (six) hours as needed for Wheezing. Inhaler  Dispense: 18 g; Refill: 1      Patient Instructions   Increase fluids   Rest activity ad norbert   Tylenol 650 mg every 4-6 hrs as needed for fever headache body aches   Advil 200 mg 2-3 tabs every 6 hrs as needed for fever, headache body aches---Must take with food   Complete antibiotic as prescribed      Albuterol inhaler as needed for cough related to wheezing   Supportive care measures   If symptoms persist or worsen follow up [facility] or PCP

## 2023-09-22 NOTE — PATIENT INSTRUCTIONS
Increase fluids   Rest activity ad norbert   Tylenol 650 mg every 4-6 hrs as needed for fever headache body aches   Advil 200 mg 2-3 tabs every 6 hrs as needed for fever, headache body aches---Must take with food   Complete antibiotic as prescribed      Albuterol inhaler as needed for cough related to wheezing   Supportive care measures   If symptoms persist or worsen follow up [facility] or PCP

## 2023-09-25 ENCOUNTER — PATIENT MESSAGE (OUTPATIENT)
Dept: FAMILY MEDICINE | Facility: CLINIC | Age: 33
End: 2023-09-25
Payer: COMMERCIAL

## 2023-09-25 ENCOUNTER — TELEPHONE (OUTPATIENT)
Dept: INTERNAL MEDICINE | Facility: CLINIC | Age: 33
End: 2023-09-25
Payer: COMMERCIAL

## 2023-09-25 NOTE — TELEPHONE ENCOUNTER
----- Message from Bianka Flynn sent at 9/25/2023  4:36 PM CDT -----  Contact: Felicianoeveretteboris Mtz is needing a call back in regards to refilling her nebulizer solution. Please give her a call back at [phone].      [id number] - [place], [place] - [address] [facility] [address]  [address] [facility]  [place] [address]  Phone: [phone] Fax: [phone]

## 2023-09-25 NOTE — TELEPHONE ENCOUNTER
Spoke to pt regarding RX, informed pt that Vannessa is out of office.  Pt stated that she will go to urgent care b/c she's completely out.

## 2023-09-26 RX ORDER — ALBUTEROL SULFATE 0.83 MG/ML
2.5 SOLUTION RESPIRATORY (INHALATION)
Qty: 250 ML | Refills: 0 | Status: SHIPPED | OUTPATIENT
Start: 2023-09-26

## 2023-10-05 ENCOUNTER — TELEPHONE (OUTPATIENT)
Dept: FAMILY MEDICINE | Facility: CLINIC | Age: 33
End: 2023-10-05
Payer: COMMERCIAL

## 2023-10-05 NOTE — TELEPHONE ENCOUNTER
----- Message from Dorcas Sánchez sent at 10/5/2023  4:02 PM CDT -----  Contact: Starla  Patient is calling to speak with a nurse . States having a missed call regarding appt . Please give a call back at [phone].

## 2023-10-06 ENCOUNTER — HOSPITAL ENCOUNTER (OUTPATIENT)
Dept: RADIOLOGY | Facility: HOSPITAL | Age: 33
Discharge: HOME | End: 2023-10-06
Attending: NURSE PRACTITIONER
Payer: COMMERCIAL

## 2023-10-06 ENCOUNTER — TELEPHONE (OUTPATIENT)
Dept: FAMILY MEDICINE | Facility: CLINIC | Age: 33
End: 2023-10-06
Payer: COMMERCIAL

## 2023-10-06 ENCOUNTER — OFFICE VISIT (OUTPATIENT)
Dept: FAMILY MEDICINE | Facility: CLINIC | Age: 33
End: 2023-10-06
Payer: COMMERCIAL

## 2023-10-06 VITALS
TEMPERATURE: 97 F | OXYGEN SATURATION: 98 % | HEIGHT: 66 IN | DIASTOLIC BLOOD PRESSURE: 88 MMHG | BODY MASS INDEX: 33.1 KG/M2 | HEART RATE: 82 BPM | SYSTOLIC BLOOD PRESSURE: 122 MMHG | WEIGHT: 205.94 LBS | RESPIRATION RATE: 18 BRPM

## 2023-10-06 DIAGNOSIS — R51.9 NONINTRACTABLE HEADACHE, UNSPECIFIED CHRONICITY PATTERN, UNSPECIFIED HEADACHE TYPE: Primary | ICD-10-CM

## 2023-10-06 DIAGNOSIS — R51.9 NONINTRACTABLE HEADACHE, UNSPECIFIED CHRONICITY PATTERN, UNSPECIFIED HEADACHE TYPE: ICD-10-CM

## 2023-10-06 DIAGNOSIS — N92.6 MISSED MENSES: ICD-10-CM

## 2023-10-06 LAB
B-HCG UR QL: NEGATIVE
CTP QC/QA: YES

## 2023-10-06 PROCEDURE — 81025 POCT URINE PREGNANCY: ICD-10-PCS | Mod: S$GLB,,, | Performed by: NURSE PRACTITIONER

## 2023-10-06 PROCEDURE — 99999 PR PBB SHADOW E&M-EST. PATIENT-LVL IV: ICD-10-PCS | Mod: PBBFAC,,, | Performed by: NURSE PRACTITIONER

## 2023-10-06 PROCEDURE — 70450 CT HEAD/BRAIN W/O DYE: CPT | Mod: 26,,, | Performed by: RADIOLOGY

## 2023-10-06 PROCEDURE — 3008F BODY MASS INDEX DOCD: CPT | Mod: CPTII,S$GLB,, | Performed by: NURSE PRACTITIONER

## 2023-10-06 PROCEDURE — 99214 OFFICE O/P EST MOD 30 MIN: CPT | Mod: S$GLB,,, | Performed by: NURSE PRACTITIONER

## 2023-10-06 PROCEDURE — 3008F PR BODY MASS INDEX (BMI) DOCUMENTED: ICD-10-PCS | Mod: CPTII,S$GLB,, | Performed by: NURSE PRACTITIONER

## 2023-10-06 PROCEDURE — 1159F MED LIST DOCD IN RCRD: CPT | Mod: CPTII,S$GLB,, | Performed by: NURSE PRACTITIONER

## 2023-10-06 PROCEDURE — 99214 PR OFFICE/OUTPT VISIT, EST, LEVL IV, 30-39 MIN: ICD-10-PCS | Mod: S$GLB,,, | Performed by: NURSE PRACTITIONER

## 2023-10-06 PROCEDURE — 1160F PR REVIEW ALL MEDS BY PRESCRIBER/CLIN PHARMACIST DOCUMENTED: ICD-10-PCS | Mod: CPTII,S$GLB,, | Performed by: NURSE PRACTITIONER

## 2023-10-06 PROCEDURE — 70450 CT HEAD WITHOUT CONTRAST: ICD-10-PCS | Mod: 26,,, | Performed by: RADIOLOGY

## 2023-10-06 PROCEDURE — 70450 CT HEAD/BRAIN W/O DYE: CPT | Mod: TC

## 2023-10-06 PROCEDURE — 3074F SYST BP LT 130 MM HG: CPT | Mod: CPTII,S$GLB,, | Performed by: NURSE PRACTITIONER

## 2023-10-06 PROCEDURE — 1159F PR MEDICATION LIST DOCUMENTED IN MEDICAL RECORD: ICD-10-PCS | Mod: CPTII,S$GLB,, | Performed by: NURSE PRACTITIONER

## 2023-10-06 PROCEDURE — 3079F PR MOST RECENT DIASTOLIC BLOOD PRESSURE 80-89 MM HG: ICD-10-PCS | Mod: CPTII,S$GLB,, | Performed by: NURSE PRACTITIONER

## 2023-10-06 PROCEDURE — 1160F RVW MEDS BY RX/DR IN RCRD: CPT | Mod: CPTII,S$GLB,, | Performed by: NURSE PRACTITIONER

## 2023-10-06 PROCEDURE — 99999 PR PBB SHADOW E&M-EST. PATIENT-LVL IV: CPT | Mod: PBBFAC,,, | Performed by: NURSE PRACTITIONER

## 2023-10-06 PROCEDURE — 81025 URINE PREGNANCY TEST: CPT | Mod: S$GLB,,, | Performed by: NURSE PRACTITIONER

## 2023-10-06 PROCEDURE — 3074F PR MOST RECENT SYSTOLIC BLOOD PRESSURE < 130 MM HG: ICD-10-PCS | Mod: CPTII,S$GLB,, | Performed by: NURSE PRACTITIONER

## 2023-10-06 PROCEDURE — 3079F DIAST BP 80-89 MM HG: CPT | Mod: CPTII,S$GLB,, | Performed by: NURSE PRACTITIONER

## 2023-10-06 RX ORDER — IBUPROFEN 800 MG/1
800 TABLET ORAL 3 TIMES DAILY
Qty: 30 TABLET | Refills: 5 | Status: SHIPPED | OUTPATIENT
Start: 2023-10-06

## 2023-10-06 RX ORDER — BUTALBITAL, ACETAMINOPHEN AND CAFFEINE 50; 325; 40 MG/1; MG/1; MG/1
1 TABLET ORAL EVERY 4 HOURS PRN
Qty: 30 TABLET | Refills: 0 | Status: SHIPPED | OUTPATIENT
Start: 2023-10-06 | End: 2023-11-05

## 2023-10-06 NOTE — PROGRESS NOTES
Smith Love  10/06/2023  [id number]    Caro Hernandez MD  Patient Care Team:  Caro Hernandez MD as PCP - General (Family Medicine)  Arlet Mcclelland MD as PCP - Family Medicine (Family Medicine)  Vannessa Dempsey NP as Nurse Practitioner (Family Medicine)          Visit Type:an urgent visit for a new problem    Chief Complaint:  No chief complaint on file.      History of Present Illness:    33 yo female presents with co HA for the past two days and sensitivity to palpation to the left temple. Pt reports taking OTC IBP for symptom relief. Pt is anxious and concerned she may have a brain tumor despite no other symptoms.  Pt also reports she is one Missed Menses     History:  Past Medical History:   Diagnosis Date    Asthma     COVID-19 08/2020    Essential hypertension 04/27/2017    Right knee injury 07/27/2020    Staph aureus infection      History reviewed. No pertinent surgical history.  Family History   Problem Relation Age of Onset    Hypertension Maternal Grandmother     Miscarriages / Stillbirths Maternal Grandmother     Hypertension Father     Asthma Father     No Known Problems Mother      Social History     Socioeconomic History    Marital status: Single   Tobacco Use    Smoking status: Never     Passive exposure: Never    Smokeless tobacco: Never   Substance and Sexual Activity    Alcohol use: Yes     Alcohol/week: 1.0 standard drink of alcohol     Types: 1 Glasses of wine per week     Comment: socially    Drug use: No    Sexual activity: Yes     Partners: Male     Birth control/protection: None     Social Determinants of Health     Financial Resource Strain: Low Risk  (4/3/2023)    Overall Financial Resource Strain (CARDIA)     Difficulty of Paying Living Expenses: Not hard at all   Food Insecurity: No Food Insecurity (4/3/2023)    Hunger Vital Sign     Worried About Running Out of Food in the Last Year: Never true     Ran Out of Food in the Last Year: Never true   Transportation  Needs: No Transportation Needs (4/3/2023)    PRAPARE - Transportation     Lack of Transportation (Medical): No     Lack of Transportation (Non-Medical): No   Physical Activity: Insufficiently Active (4/3/2023)    Exercise Vital Sign     Days of Exercise per Week: 3 days     Minutes of Exercise per Session: 30 min   Stress: Stress Concern Present (4/3/2023)    [place] [employer] - Occupational Stress Questionnaire     Feeling of Stress : To some extent   Social Connections: Unknown (4/3/2023)    Social Connection and Isolation Panel [NHANES]     Frequency of Communication with Friends and Family: More than three times a week     Frequency of Social Gatherings with Friends and Family: Once a week     Active Member of Clubs or Organizations: No     Attends Club or Organization Meetings: Never     Marital Status: Living with partner   Housing Stability: Low Risk  (4/3/2023)    Housing Stability Vital Sign     Unable to Pay for Housing in the Last Year: No     Number of Places Lived in the Last Year: 1     Unstable Housing in the Last Year: No     Patient Active Problem List   Diagnosis    Asthma, mild intermittent    Hypertension    Seasonal allergic rhinitis due to pollen    Anxiety     Review of patient's allergies indicates:  No Known Allergies    The following were reviewed at this visit: active problem list, medication list, allergies, family history, social history, and health maintenance.    Medications:  Current Outpatient Medications on File Prior to Visit   Medication Sig Dispense Refill    albuterol (PROVENTIL) 2.5 mg /3 mL (0.083 %) nebulizer solution Take 3 mLs (2.5 mg total) by nebulization every 4 to 6 hours as needed for Wheezing or Shortness of Breath. 250 mL 0    albuterol (VENTOLIN HFA) 90 mcg/actuation inhaler Inhale 2 puffs into the lungs every 6 (six) hours as needed for Wheezing. Inhaler 18 g 1    amLODIPine (NORVASC) 5 MG tablet Take 1 tablet (5 mg total) by mouth once  daily. 90 tablet 1    ascorbic acid, vitamin C, (VITAMIN C) 500 MG tablet Take 500 mg by mouth once daily.      azelastine (ASTELIN) 137 mcg (0.1 %) nasal spray 1 spray (137 mcg total) by Nasal route 2 (two) times daily. 30 mL 2    fluticasone-salmeterol diskus inhaler 250-50 mcg Inhale 1 puff into the lungs 2 (two) times daily. Controller 180 each 5    folic acid (FOLVITE) 400 MCG tablet Take 400 mcg by mouth once daily.      montelukast (SINGULAIR) 10 mg tablet Take 1 tablet (10 mg total) by mouth every evening. 90 tablet 3    zinc gluconate 50 mg tablet Take 50 mg by mouth once daily.      dexbrompheniramine-phenyleph (ALAHIST PE) 2-7.5 mg Tab Take 1 tablet by mouth every 6 (six) hours as needed (sinus congestion). (Patient not taking: Reported on 9/22/2023) 30 tablet 2    diclofenac sodium (VOLTAREN) 1 % Gel Apply 2 g topically 4 (four) times daily. for 10 days 100 g 0    metoclopramide HCl (REGLAN) 10 MG tablet Take 1 tablet (10 mg total) by mouth every 6 (six) hours as needed (headache). (Patient not taking: Reported on 9/8/2023) 30 tablet 0     No current facility-administered medications on file prior to visit.       Medications have been reviewed and reconciled with patient at this visit.  Barriers to medications reviewed with patient.    Adverse reactions to current medications reviewed with patient..    Over the counter medications reviewed and reconciled with patient.    Exam:  Wt Readings from Last 3 Encounters:   10/06/23 93.4 kg (205 lb 14.6 oz)   09/22/23 92.5 kg (204 lb)   09/08/23 92.7 kg (204 lb 5.9 oz)     Temp Readings from Last 3 Encounters:   10/06/23 96.7 °F (35.9 °C) (Tympanic)   09/22/23 98.1 °F (36.7 °C) (Oral)   09/08/23 97.8 °F (36.6 °C) (Tympanic)     BP Readings from Last 3 Encounters:   10/06/23 122/88   09/22/23 122/84   09/08/23 128/84     Pulse Readings from Last 3 Encounters:   10/06/23 82   09/22/23 81   09/08/23 98     Body mass index is 33.23 kg/m².      Review of Systems    Constitutional:  Negative for fever.   Respiratory:  Negative for cough, shortness of breath and wheezing.    Cardiovascular:  Negative for chest pain and palpitations.   Gastrointestinal:  Negative for nausea.   Neurological:  Positive for headaches. Negative for speech change and weakness.   All other systems reviewed and are negative.    Physical Exam  Vitals and nursing note reviewed.   Constitutional:       Appearance: Normal appearance. She is obese.   HENT:      Head: Normocephalic and atraumatic.      Right Ear: Tympanic membrane, ear canal and external ear normal.      Left Ear: Tympanic membrane, ear canal and external ear normal.      Nose: Nose normal.      Mouth/Throat:      Mouth: Mucous membranes are moist.      Pharynx: Oropharynx is clear.   Eyes:      Extraocular Movements: Extraocular movements intact.      Conjunctiva/sclera: Conjunctivae normal.      Pupils: Pupils are equal, round, and reactive to light.   Cardiovascular:      Rate and Rhythm: Normal rate and regular rhythm.      Pulses: Normal pulses.      Heart sounds: Normal heart sounds.   Pulmonary:      Effort: Pulmonary effort is normal.      Breath sounds: Normal breath sounds.   Abdominal:      General: Bowel sounds are normal.      Palpations: Abdomen is soft.   Musculoskeletal:         General: Normal range of motion.      Cervical back: Normal range of motion and neck supple.   Skin:     General: Skin is warm and dry.      Capillary Refill: Capillary refill takes less than 2 seconds.   Neurological:      General: No focal deficit present.      Mental Status: She is alert and oriented to person, place, and time.   Psychiatric:         Mood and Affect: Mood normal.         Behavior: Behavior normal.         Thought Content: Thought content normal.         Judgment: Judgment normal.         Laboratory Reviewed ({Yes)  Lab Results   Component Value Date    WBC 6.30 09/11/2015    HGB 14.4 09/11/2015    HCT 41.3 09/11/2015      09/11/2015    CHOL 106 (L) 03/12/2021    TRIG 30 03/12/2021    HDL 42 03/12/2021    ALT 13 03/12/2021    AST 14 03/12/2021     03/12/2021    K 3.9 03/12/2021     03/12/2021    CREATININE 0.8 03/12/2021    BUN 10 03/12/2021    CO2 26 03/12/2021    TSH 2.035 03/12/2021    GLUF 148 (H) 08/29/2018    HGBA1C 4.7 03/12/2021       Diagnoses and all orders for this visit:    Nonintractable headache, unspecified chronicity pattern, unspecified headache type  -     ibuprofen (ADVIL,MOTRIN) 800 MG tablet; Take 1 tablet (800 mg total) by mouth 3 (three) times daily.  -     butalbital-acetaminophen-caffeine -40 mg (FIORICET, ESGIC) -40 mg per tablet; Take 1 tablet by mouth every 4 (four) hours as needed for Pain.  -     CT Head Without Contrast; Future    Missed menses  -     POCT Urine Pregnancy      Start IBP for HA if unrelieved take fiorcet      Care Plan/Goals: Reviewed    Goals    None     Diagnoses and all orders for this visit:    Nonintractable headache, unspecified chronicity pattern, unspecified headache type  -     ibuprofen (ADVIL,MOTRIN) 800 MG tablet; Take 1 tablet (800 mg total) by mouth 3 (three) times daily.  -     butalbital-acetaminophen-caffeine -40 mg (FIORICET, ESGIC) -40 mg per tablet; Take 1 tablet by mouth every 4 (four) hours as needed for Pain.  -     CT Head Without Contrast; Future    Missed menses  -     POCT Urine Pregnancy         Follow up: Follow up in about 6 months (around 4/6/2024) for with scarlett ESPINOSA in 6 months.    After visit summary was printed and given to patient upon discharge today.  Patient goals and care plan are included in After Visit Summary.

## 2023-10-16 ENCOUNTER — PATIENT MESSAGE (OUTPATIENT)
Dept: FAMILY MEDICINE | Facility: CLINIC | Age: 33
End: 2023-10-16
Payer: COMMERCIAL

## 2023-10-18 DIAGNOSIS — J45.909 NOT WELL CONTROLLED ASTHMA WITHOUT COMPLICATION: ICD-10-CM

## 2023-10-18 DIAGNOSIS — R06.2 WHEEZING: ICD-10-CM

## 2023-10-18 RX ORDER — FLUTICASONE PROPIONATE AND SALMETEROL 250; 50 UG/1; UG/1
1 POWDER RESPIRATORY (INHALATION) 2 TIMES DAILY
Qty: 180 EACH | Refills: 5 | Status: SHIPPED | OUTPATIENT
Start: 2023-10-18 | End: 2024-10-17

## 2023-10-18 RX ORDER — ALBUTEROL SULFATE 90 UG/1
2 AEROSOL, METERED RESPIRATORY (INHALATION) EVERY 6 HOURS PRN
Qty: 18 G | Refills: 1 | Status: SHIPPED | OUTPATIENT
Start: 2023-10-18

## 2023-10-18 NOTE — TELEPHONE ENCOUNTER
Requesting medications were ordered sent to  [facility] [id number] [place] [address]

## 2023-10-19 ENCOUNTER — OFFICE VISIT (OUTPATIENT)
Dept: URGENT CARE | Facility: CLINIC | Age: 33
End: 2023-10-19
Payer: COMMERCIAL

## 2023-10-19 VITALS
HEART RATE: 96 BPM | HEIGHT: 66 IN | RESPIRATION RATE: 18 BRPM | DIASTOLIC BLOOD PRESSURE: 94 MMHG | OXYGEN SATURATION: 98 % | BODY MASS INDEX: 32.95 KG/M2 | TEMPERATURE: 98 F | SYSTOLIC BLOOD PRESSURE: 120 MMHG | WEIGHT: 205 LBS

## 2023-10-19 DIAGNOSIS — R09.81 SINUS CONGESTION: ICD-10-CM

## 2023-10-19 DIAGNOSIS — Z20.822 EXPOSURE TO COVID-19 VIRUS: ICD-10-CM

## 2023-10-19 DIAGNOSIS — H65.93 BILATERAL OTITIS MEDIA WITH EFFUSION: Primary | ICD-10-CM

## 2023-10-19 LAB
CTP QC/QA: YES
SARS-COV-2 AG RESP QL IA.RAPID: NEGATIVE

## 2023-10-19 PROCEDURE — 99213 OFFICE O/P EST LOW 20 MIN: CPT | Mod: S$GLB,,, | Performed by: NURSE PRACTITIONER

## 2023-10-19 PROCEDURE — 87811 SARS-COV-2 COVID19 W/OPTIC: CPT | Mod: QW,S$GLB,, | Performed by: NURSE PRACTITIONER

## 2023-10-19 PROCEDURE — 99213 PR OFFICE/OUTPT VISIT, EST, LEVL III, 20-29 MIN: ICD-10-PCS | Mod: S$GLB,,, | Performed by: NURSE PRACTITIONER

## 2023-10-19 PROCEDURE — 87811 SARS CORONAVIRUS 2 ANTIGEN POCT, MANUAL READ: ICD-10-PCS | Mod: QW,S$GLB,, | Performed by: NURSE PRACTITIONER

## 2023-10-19 NOTE — PATIENT INSTRUCTIONS
Patient Instructions   Increase fluids   Rest activity ad norbert   Tylenol 650 mg every 4-6 hrs as needed for fever headache body aches   Supportive care measures   Viral infections usually resolve in 7-10 days; If symptoms persist or worsen follow up UC or PCP   Daily allergy meds   Repeat Covid testing as needed

## 2023-10-19 NOTE — PROGRESS NOTES
"Subjective:      Patient ID: Smith Love is a 32 y.o. female.    Vitals:  height is 5' 6" (1.676 m) and weight is 93 kg (205 lb). Her oral temperature is 98.4 °F (36.9 °C). Her blood pressure is 120/94 (abnormal) and her pulse is 96. Her respiration is 18 and oxygen saturation is 98%.     Chief Complaint: Sinus Problem    Patient presents to clinic with sinus congestion, onset 3 days. Sinus pressure in her head, and ear pressure. No recorded fever.     Sinus Problem  This is a new problem. The current episode started in the past 7 days. The problem has been gradually worsening since onset. Associated symptoms include congestion, ear pain and sinus pressure.       HENT:  Positive for ear pain, congestion and sinus pressure.       Objective:     Vitals:    10/19/23 1013 10/19/23 1032 10/19/23 1040 10/19/23 1041   BP: (!) 141/98 (!) 144/98 (!) 124/98 (!) 120/94   BP Location: Left arm Left arm  Left arm   Patient Position: Sitting Sitting  Sitting   BP Method: Medium (Automatic)   Large (Automatic)   Pulse: 96      Resp: 18      Temp: 98.4 °F (36.9 °C)      TempSrc: Oral      SpO2: 98%      Weight: 93 kg (205 lb)      Height: 5' 6" (1.676 m)          Physical Exam   Constitutional: She is oriented to person, place, and time. She appears well-developed. She is cooperative.  Non-toxic appearance. She does not appear ill. No distress.   HENT:   Head: Normocephalic and atraumatic.   Ears:   Right Ear: Hearing, external ear and ear canal normal. A middle ear effusion is present.   Left Ear: Hearing, external ear and ear canal normal. A middle ear effusion is present.   Nose: Congestion present. No mucosal edema, rhinorrhea or nasal deformity. No epistaxis. Right sinus exhibits no maxillary sinus tenderness and no frontal sinus tenderness. Left sinus exhibits no maxillary sinus tenderness and no frontal sinus tenderness.   Mouth/Throat: Uvula is midline, oropharynx is clear and moist and mucous membranes are " normal. Mucous membranes are moist. No trismus in the jaw. Normal dentition. No uvula swelling. No oropharyngeal exudate, posterior oropharyngeal edema or posterior oropharyngeal erythema.   Eyes: Conjunctivae and lids are normal. Pupils are equal, round, and reactive to light. No scleral icterus. Extraocular movement intact   Neck: Trachea normal and phonation normal. Neck supple. No edema present. No erythema present. No neck rigidity present.   Cardiovascular: Normal rate, regular rhythm, normal heart sounds and normal pulses.   Pulmonary/Chest: Effort normal and breath sounds normal. No respiratory distress. She has no decreased breath sounds. She has no rhonchi.   Abdominal: Normal appearance.   Musculoskeletal: Normal range of motion.         General: No deformity. Normal range of motion.   Neurological: She is alert and oriented to person, place, and time. She exhibits normal muscle tone. Coordination normal.   Skin: Skin is warm, dry, intact, not diaphoretic and not pale.   Psychiatric: Her speech is normal and behavior is normal. Judgment and thought content normal.   Nursing note and vitals reviewed.      Assessment:     1. Bilateral otitis media with effusion    2. Sinus congestion    3. Exposure to COVID-19 virus      Results for orders placed or performed in visit on 10/19/23   SARS Coronavirus 2 Antigen, POCT Manual Read   Result Value Ref Range    SARS Coronavirus 2 Antigen Negative Negative     Acceptable Yes        Plan:       Additional MDM:   Differential Diagnosis:   Other: The following diagnoses were also considered and will be evaluated: uri, allergic rhinitis and viral syndrome covid 19. Patient presents with minimal symptoms of congestion and ear pressure pain  At this time minimal symptoms;  Concerned for elevated bp reading during intake;   Several repeat readings with larger cuff  with noted decrease to 120/90+; patient advised best time to recheck is 6 hrs post admin of bp  med; Reviewed meds and substances that can elevated bp and to take into consideration   Negative covid   Patient to monitor uri symptoms and bp and follow up with PCP as needed    f/u testing at [facility] or PCP as needed   Agreeable to plan and verbalized understanding     Patient stable for discharge and home management of condition        Bilateral otitis media with effusion    Sinus congestion  -     SARS Coronavirus 2 Antigen, POCT Manual Read    Exposure to COVID-19 virus  -     SARS Coronavirus 2 Antigen, POCT Manual Read      Patient Instructions     Patient Instructions   Increase fluids   Rest activity ad norbert   Tylenol 650 mg every 4-6 hrs as needed for fever headache body aches   Supportive care measures   Viral infections usually resolve in 7-10 days; If symptoms persist or worsen follow up UC or PCP   Daily allergy meds   Repeat Covid testing as needed

## 2023-10-26 ENCOUNTER — HOSPITAL ENCOUNTER (EMERGENCY)
Facility: HOSPITAL | Age: 33
Discharge: HOME | End: 2023-10-26
Attending: EMERGENCY MEDICINE
Payer: COMMERCIAL

## 2023-10-26 ENCOUNTER — PATIENT MESSAGE (OUTPATIENT)
Dept: FAMILY MEDICINE | Facility: CLINIC | Age: 33
End: 2023-10-26
Payer: COMMERCIAL

## 2023-10-26 VITALS
HEART RATE: 65 BPM | SYSTOLIC BLOOD PRESSURE: 130 MMHG | OXYGEN SATURATION: 99 % | TEMPERATURE: 98 F | RESPIRATION RATE: 16 BRPM | BODY MASS INDEX: 33.09 KG/M2 | WEIGHT: 205 LBS | DIASTOLIC BLOOD PRESSURE: 92 MMHG

## 2023-10-26 DIAGNOSIS — R42 DIZZINESS: ICD-10-CM

## 2023-10-26 DIAGNOSIS — R06.83 LOUD SNORING: Primary | ICD-10-CM

## 2023-10-26 DIAGNOSIS — R00.1 BRADYCARDIA: ICD-10-CM

## 2023-10-26 LAB
ALBUMIN SERPL BCP-MCNC: 4.1 G/DL (ref 3.5–5.2)
ALP SERPL-CCNC: 79 U/L (ref 55–135)
ALT SERPL W/O P-5'-P-CCNC: 17 U/L (ref 10–44)
ANION GAP SERPL CALC-SCNC: 14 MMOL/L (ref 8–16)
AST SERPL-CCNC: 9 U/L (ref 10–40)
BASOPHILS # BLD AUTO: 0.03 K/UL (ref 0–0.2)
BASOPHILS NFR BLD: 0.3 % (ref 0–1.9)
BILIRUB SERPL-MCNC: 0.8 MG/DL (ref 0.1–1)
BNP SERPL-MCNC: 50 PG/ML (ref 0–99)
BUN SERPL-MCNC: 13 MG/DL (ref 6–20)
CALCIUM SERPL-MCNC: 8.7 MG/DL (ref 8.7–10.5)
CHLORIDE SERPL-SCNC: 103 MMOL/L (ref 95–110)
CO2 SERPL-SCNC: 20 MMOL/L (ref 23–29)
CREAT SERPL-MCNC: 0.9 MG/DL (ref 0.5–1.4)
DIFFERENTIAL METHOD: ABNORMAL
EOSINOPHIL # BLD AUTO: 0 K/UL (ref 0–0.5)
EOSINOPHIL NFR BLD: 0.1 % (ref 0–8)
ERYTHROCYTE [DISTWIDTH] IN BLOOD BY AUTOMATED COUNT: 11.4 % (ref 11.5–14.5)
EST. GFR  (NO RACE VARIABLE): >60 ML/MIN/1.73 M^2
GLUCOSE SERPL-MCNC: 237 MG/DL (ref 70–110)
HCT VFR BLD AUTO: 42.2 % (ref 37–48.5)
HGB BLD-MCNC: 14.6 G/DL (ref 12–16)
IMM GRANULOCYTES # BLD AUTO: 0.04 K/UL (ref 0–0.04)
IMM GRANULOCYTES NFR BLD AUTO: 0.4 % (ref 0–0.5)
LYMPHOCYTES # BLD AUTO: 1.7 K/UL (ref 1–4.8)
LYMPHOCYTES NFR BLD: 15.8 % (ref 18–48)
MAGNESIUM SERPL-MCNC: 1.9 MG/DL (ref 1.6–2.6)
MCH RBC QN AUTO: 29.7 PG (ref 27–31)
MCHC RBC AUTO-ENTMCNC: 34.6 G/DL (ref 32–36)
MCV RBC AUTO: 86 FL (ref 82–98)
MONOCYTES # BLD AUTO: 1 K/UL (ref 0.3–1)
MONOCYTES NFR BLD: 8.9 % (ref 4–15)
NEUTROPHILS # BLD AUTO: 8 K/UL (ref 1.8–7.7)
NEUTROPHILS NFR BLD: 74.5 % (ref 38–73)
NRBC BLD-RTO: 0 /100 WBC
PLATELET # BLD AUTO: 252 K/UL (ref 150–450)
PMV BLD AUTO: 11.2 FL (ref 9.2–12.9)
POTASSIUM SERPL-SCNC: 4 MMOL/L (ref 3.5–5.1)
PROT SERPL-MCNC: 7.7 G/DL (ref 6–8.4)
RBC # BLD AUTO: 4.92 M/UL (ref 4–5.4)
SODIUM SERPL-SCNC: 137 MMOL/L (ref 136–145)
TROPONIN I SERPL DL<=0.01 NG/ML-MCNC: <0.006 NG/ML (ref 0–0.03)
TSH SERPL DL<=0.005 MIU/L-ACNC: 2.42 UIU/ML (ref 0.4–4)
WBC # BLD AUTO: 10.7 K/UL (ref 3.9–12.7)

## 2023-10-26 PROCEDURE — 83880 ASSAY OF NATRIURETIC PEPTIDE: CPT | Performed by: EMERGENCY MEDICINE

## 2023-10-26 PROCEDURE — 99284 EMERGENCY DEPT VISIT MOD MDM: CPT

## 2023-10-26 PROCEDURE — 84443 ASSAY THYROID STIM HORMONE: CPT | Performed by: EMERGENCY MEDICINE

## 2023-10-26 PROCEDURE — 85025 COMPLETE CBC W/AUTO DIFF WBC: CPT | Performed by: EMERGENCY MEDICINE

## 2023-10-26 PROCEDURE — 93010 EKG 12-LEAD: ICD-10-PCS | Mod: ,,, | Performed by: INTERNAL MEDICINE

## 2023-10-26 PROCEDURE — 80053 COMPREHEN METABOLIC PANEL: CPT | Performed by: EMERGENCY MEDICINE

## 2023-10-26 PROCEDURE — 83735 ASSAY OF MAGNESIUM: CPT | Performed by: EMERGENCY MEDICINE

## 2023-10-26 PROCEDURE — 84484 ASSAY OF TROPONIN QUANT: CPT | Performed by: EMERGENCY MEDICINE

## 2023-10-26 PROCEDURE — 93010 ELECTROCARDIOGRAM REPORT: CPT | Mod: ,,, | Performed by: INTERNAL MEDICINE

## 2023-10-26 PROCEDURE — 93005 ELECTROCARDIOGRAM TRACING: CPT

## 2023-10-26 NOTE — ED PROVIDER NOTES
SCRIBE #1 NOTE: I, Demond Damon, am scribing for, and in the presence of, King Acosta MD. I have scribed the entire note.      History      Chief Complaint   Patient presents with    Bradycardia     Bradycardia this am denies any other compliants       Review of patient's allergies indicates:  No Known Allergies     HPI   HPI    10/26/2023, 11:23 AM   History obtained from the patient      History of Present Illness: Smith Love is a 32 y.o. female patient with a PMHx of HTN who presents to the Emergency Department for bradycardia. Pt states that her Apple Watch was reading that she was bradycardic at 6 AM this morning, with a HR in the 40s. Pt notes that she does snore frequently at night. Symptoms are constant and moderate in severity. No mitigating or exacerbating factors reported. Associated sxs include anxiety and dizziness. Patient denies any fever, chills, n/v/d, SOB, CP, weakness, numbness, headache, and all other sxs at this time. No prior Tx reported. No further complaints or concerns at this time.     Arrival mode: Personal vehicle    PCP: Caro Hernandez MD       Past Medical History:  Past Medical History:   Diagnosis Date    Asthma     COVID-19 08/2020    Essential hypertension 04/27/2017    Right knee injury 07/27/2020    Staph aureus infection        Past Surgical History:  No past surgical history on file.      Family History:  Family History   Problem Relation Age of Onset    Hypertension Maternal Grandmother     Miscarriages / Stillbirths Maternal Grandmother     Hypertension Father     Asthma Father     No Known Problems Mother        Social History:  Social History     Tobacco Use    Smoking status: Never     Passive exposure: Never    Smokeless tobacco: Never   Substance and Sexual Activity    Alcohol use: Yes     Alcohol/week: 1.0 standard drink of alcohol     Types: 1 Glasses of wine per week     Comment: socially    Drug use: No    Sexual activity: Yes     Partners: Male      Birth control/protection: None       ROS   Review of Systems   Constitutional:  Negative for chills and fever.   HENT:  Negative for sore throat.    Respiratory:  Negative for shortness of breath.    Cardiovascular:  Negative for chest pain.   Gastrointestinal:  Negative for diarrhea, nausea and vomiting.   Genitourinary:  Negative for dysuria.   Musculoskeletal:  Negative for back pain.   Skin:  Negative for rash.   Neurological:  Positive for dizziness. Negative for weakness, numbness and headaches.   Hematological:  Does not bruise/bleed easily.   Psychiatric/Behavioral:  The patient is nervous/anxious.    All other systems reviewed and are negative.    Physical Exam      Initial Vitals   BP Pulse Resp Temp SpO2   10/26/23 1105 10/26/23 1105 10/26/23 1105 10/26/23 1109 10/26/23 1105   (!) 168/102 98 18 98.3 °F (36.8 °C) 100 %      MAP       --                 Physical Exam  Nursing Notes and Vital Signs Reviewed.  Constitutional: Patient is in no acute distress. Well-developed and well-nourished.  Head: Atraumatic. Normocephalic.  Eyes: PERRL. EOM intact. Conjunctivae are not pale. No scleral icterus.  ENT: Mucous membranes are moist. Oropharynx is clear and symmetric.    Neck: Supple. Full ROM.   Cardiovascular: Regular rate. Regular rhythm. No murmurs, rubs, or gallops. Distal pulses are 2+ and symmetric.  Pulmonary/Chest: No respiratory distress. Clear to auscultation bilaterally. No wheezing or rales.  Abdominal: Soft and non-distended.  There is no tenderness.  No rebound, guarding, or rigidity.   Musculoskeletal: Moves all extremities. No obvious deformities. No edema.  Skin: Warm and dry.  Neurological:  Alert, awake, and appropriate.  Normal speech.  No acute focal neurological deficits are appreciated.  Psychiatric: Normal affect. Good eye contact. Appropriate in content.    ED Course    Procedures  ED Vital Signs:  Vitals:    10/26/23 1105 10/26/23 1109   BP: (!) 168/102    Pulse: 98    Resp: 18     Temp:  98.3 °F (36.8 °C)   SpO2: 100%    Weight:  93 kg (205 lb 0.4 oz)       Abnormal Lab Results:  Labs Reviewed   CBC W/ AUTO DIFFERENTIAL - Abnormal; Notable for the following components:       Result Value    RDW 11.4 (*)     Gran # (ANC) 8.0 (*)     Gran % 74.5 (*)     Lymph % 15.8 (*)     All other components within normal limits   COMPREHENSIVE METABOLIC PANEL - Abnormal; Notable for the following components:    CO2 20 (*)     Glucose 237 (*)     AST 9 (*)     All other components within normal limits   B-TYPE NATRIURETIC PEPTIDE   TROPONIN I   TSH   MAGNESIUM        All Lab Results:  Results for orders placed or performed during the hospital encounter of 10/26/23   CBC auto differential   Result Value Ref Range    WBC 10.70 3.90 - 12.70 K/uL    RBC 4.92 4.00 - 5.40 M/uL    Hemoglobin 14.6 12.0 - 16.0 g/dL    Hematocrit 42.2 37.0 - 48.5 %    MCV 86 82 - 98 fL    MCH 29.7 27.0 - 31.0 pg    MCHC 34.6 32.0 - 36.0 g/dL    RDW 11.4 (L) 11.5 - 14.5 %    Platelets 252 150 - 450 K/uL    MPV 11.2 9.2 - 12.9 fL    Immature Granulocytes 0.4 0.0 - 0.5 %    Gran # (ANC) 8.0 (H) 1.8 - 7.7 K/uL    Immature Grans (Abs) 0.04 0.00 - 0.04 K/uL    Lymph # 1.7 1.0 - 4.8 K/uL    Mono # 1.0 0.3 - 1.0 K/uL    Eos # 0.0 0.0 - 0.5 K/uL    Baso # 0.03 0.00 - 0.20 K/uL    nRBC 0 0 /100 WBC    Gran % 74.5 (H) 38.0 - 73.0 %    Lymph % 15.8 (L) 18.0 - 48.0 %    Mono % 8.9 4.0 - 15.0 %    Eosinophil % 0.1 0.0 - 8.0 %    Basophil % 0.3 0.0 - 1.9 %    Differential Method Automated    Comprehensive metabolic panel   Result Value Ref Range    Sodium 137 136 - 145 mmol/L    Potassium 4.0 3.5 - 5.1 mmol/L    Chloride 103 95 - 110 mmol/L    CO2 20 (L) 23 - 29 mmol/L    Glucose 237 (H) 70 - 110 mg/dL    BUN 13 6 - 20 mg/dL    Creatinine 0.9 0.5 - 1.4 mg/dL    Calcium 8.7 8.7 - 10.5 mg/dL    Total Protein 7.7 6.0 - 8.4 g/dL    Albumin 4.1 3.5 - 5.2 g/dL    Total Bilirubin 0.8 0.1 - 1.0 mg/dL    Alkaline Phosphatase 79 55 - 135 U/L    AST 9 (L) 10  - 40 U/L    ALT 17 10 - 44 U/L    eGFR >60 >60 mL/min/1.73 m^2    Anion Gap 14 8 - 16 mmol/L   Brain natriuretic peptide   Result Value Ref Range    BNP 50 0 - 99 pg/mL   Troponin I   Result Value Ref Range    Troponin I <0.006 0.000 - 0.026 ng/mL   TSH   Result Value Ref Range    TSH 2.418 0.400 - 4.000 uIU/mL   Magnesium   Result Value Ref Range    Magnesium 1.9 1.6 - 2.6 mg/dL     Imaging Results:  Imaging Results    None        The EKG was ordered, reviewed, and independently interpreted by the ED provider.  Interpretation time: 11:03  Rate: 83 BPM  Rhythm: normal sinus rhythm  Interpretation: Possible anterior infarct, age undetermined. No STEMI.           The Emergency Provider reviewed the vital signs and test results, which are outlined above.    ED Discussion     1:16 PM: Reassessed pt at this time. Discussed with pt all pertinent ED information and results. Discussed pt dx and plan of tx. Gave pt all f/u and return to the ED instructions. All questions and concerns were addressed at this time. Pt expresses understanding of information and instructions, and is comfortable with plan to discharge. Pt is stable for discharge.    I discussed with patient and/or family/caretaker that evaluation in the ED does not suggest any emergent or life threatening medical conditions requiring immediate intervention beyond what was provided in the ED, and I believe patient is safe for discharge.  Regardless, an unremarkable evaluation in the ED does not preclude the development or presence of a serious of life threatening condition. As such, patient was instructed to return immediately for any worsening or change in current symptoms.         ED Medication(s):  Medications - No data to display     Follow-up Information       The [facility]. Schedule an appointment as soon as possible for a visit in 2 days.    Specialty: Cardiology  Why: For re-evaluation and further treatment  Contact information:  [id number]  [facility] [id number] [phone]  Additional information:  Please park on the [address] side and use the Clinic entrance. Check in on the 3rd floor, to the right.             [facility] - Emergency Dept.. Go today.    Specialty: Emergency Medicine  Why: If symptoms worsen, For re-evaluation and further treatment, As needed  Contact information:  [address]  [facility] [address] [phone]                         New Prescriptions    No medications on file         Medical Decision Making    Medical Decision Making  Differential diagnosis includes asymptomatic bradycardia, sleep apnea, arrhythmia, electrolyte abnormality, hypothyroidism    Amount and/or Complexity of Data Reviewed  Labs: ordered. Decision-making details documented in ED Course.  ECG/medicine tests: ordered and independent interpretation performed. Decision-making details documented in ED Course.                Scribe Attestation:   Scribe #1: I performed the above scribed service and the documentation accurately describes the services I performed. I attest to the accuracy of the note.    Attending:   Physician Attestation Statement for Scribe #1: I, King Acosta MD, personally performed the services described in this documentation, as scribed by Demond Damon, in my presence, and it is both accurate and complete.          Clinical Impression       ICD-10-CM ICD-9-CM   1. Loud snoring  R06.83 786.09   2. Bradycardia  R00.1 427.89   3. Dizziness  R42 780.4       Disposition:   Disposition: Discharged  Condition: Stable         King Acosta MD  10/26/23 1912

## 2023-10-26 NOTE — TELEPHONE ENCOUNTER
"Please advise per pt's portal message:    "Hi Dr. Hernandez,      I noticed on my apple watch that my resting heart rate was 46 when I woke up this morning around 6am. Ive been monitoring my watch and its consistently been very low in the 50s. Is this a cause for concern?      Ive never seen it this low, typically stays within 60-70 bpm.      Looking forward to hearing from you.   " Render In Strict Bullet Format?: No Detail Level: Zone Initiate Treatment: betamethasone dipropionate 0.05 % topical cream \\nQuantity: 45.0 g  Days Supply: 30\\nSig: Apply to worst flares of eczema on the body bid prn x up to 2 weeks per month. Do not use on face

## 2023-10-27 ENCOUNTER — OFFICE VISIT (OUTPATIENT)
Dept: CARDIOLOGY | Facility: CLINIC | Age: 33
End: 2023-10-27
Payer: COMMERCIAL

## 2023-10-27 ENCOUNTER — PATIENT MESSAGE (OUTPATIENT)
Dept: OBSTETRICS AND GYNECOLOGY | Facility: CLINIC | Age: 33
End: 2023-10-27

## 2023-10-27 ENCOUNTER — OFFICE VISIT (OUTPATIENT)
Dept: FAMILY MEDICINE | Facility: CLINIC | Age: 33
End: 2023-10-27
Attending: FAMILY MEDICINE
Payer: COMMERCIAL

## 2023-10-27 ENCOUNTER — OFFICE VISIT (OUTPATIENT)
Dept: OBSTETRICS AND GYNECOLOGY | Facility: CLINIC | Age: 33
End: 2023-10-27
Payer: COMMERCIAL

## 2023-10-27 ENCOUNTER — TELEPHONE (OUTPATIENT)
Dept: OBSTETRICS AND GYNECOLOGY | Facility: CLINIC | Age: 33
End: 2023-10-27
Payer: COMMERCIAL

## 2023-10-27 VITALS
SYSTOLIC BLOOD PRESSURE: 142 MMHG | HEIGHT: 66 IN | DIASTOLIC BLOOD PRESSURE: 98 MMHG | BODY MASS INDEX: 33.17 KG/M2 | WEIGHT: 206.38 LBS

## 2023-10-27 VITALS
BODY MASS INDEX: 32.81 KG/M2 | WEIGHT: 204.13 LBS | DIASTOLIC BLOOD PRESSURE: 92 MMHG | HEIGHT: 66 IN | SYSTOLIC BLOOD PRESSURE: 142 MMHG

## 2023-10-27 DIAGNOSIS — R00.1 BRADYCARDIA: ICD-10-CM

## 2023-10-27 DIAGNOSIS — Z01.419 ENCOUNTER FOR GYNECOLOGICAL EXAMINATION WITHOUT ABNORMAL FINDING: Primary | ICD-10-CM

## 2023-10-27 DIAGNOSIS — I10 HYPERTENSION, UNSPECIFIED TYPE: ICD-10-CM

## 2023-10-27 DIAGNOSIS — R94.31 EKG ABNORMALITIES: ICD-10-CM

## 2023-10-27 DIAGNOSIS — M54.50 ACUTE MIDLINE LOW BACK PAIN WITHOUT SCIATICA: ICD-10-CM

## 2023-10-27 DIAGNOSIS — Z12.4 CERVICAL CANCER SCREENING: ICD-10-CM

## 2023-10-27 DIAGNOSIS — I10 PRIMARY HYPERTENSION: Primary | ICD-10-CM

## 2023-10-27 DIAGNOSIS — Z31.69 ENCOUNTER FOR PRECONCEPTION CONSULTATION: ICD-10-CM

## 2023-10-27 DIAGNOSIS — R73.09 ELEVATED GLUCOSE: Primary | ICD-10-CM

## 2023-10-27 LAB
BILIRUB SERPL-MCNC: NEGATIVE MG/DL
BLOOD URINE, POC: NEGATIVE
CLARITY, POC UA: CLEAR
COLOR, POC UA: NORMAL
GLUCOSE UR QL STRIP: NORMAL
KETONES UR QL STRIP: NEGATIVE
LEUKOCYTE ESTERASE URINE, POC: NEGATIVE
NITRITE, POC UA: NEGATIVE
PH, POC UA: 6.5
PROTEIN, POC: NORMAL
SPECIFIC GRAVITY, POC UA: 1.03
UROBILINOGEN, POC UA: NORMAL

## 2023-10-27 PROCEDURE — 3052F PR MOST RECENT HEMOGLOBIN A1C LEVEL 8.0 - < 9.0%: ICD-10-PCS | Mod: CPTII,95,, | Performed by: FAMILY MEDICINE

## 2023-10-27 PROCEDURE — 99213 OFFICE O/P EST LOW 20 MIN: CPT | Mod: 95,,, | Performed by: FAMILY MEDICINE

## 2023-10-27 PROCEDURE — 88141 PR  CYTOPATH CERV/VAG INTERPRET: ICD-10-PCS | Mod: ,,, | Performed by: PATHOLOGY

## 2023-10-27 PROCEDURE — 3052F HG A1C>EQUAL 8.0%<EQUAL 9.0%: CPT | Mod: CPTII,95,, | Performed by: FAMILY MEDICINE

## 2023-10-27 PROCEDURE — 1160F RVW MEDS BY RX/DR IN RCRD: CPT | Mod: CPTII,S$GLB,, | Performed by: INTERNAL MEDICINE

## 2023-10-27 PROCEDURE — 3008F BODY MASS INDEX DOCD: CPT | Mod: CPTII,S$GLB,, | Performed by: INTERNAL MEDICINE

## 2023-10-27 PROCEDURE — 3008F PR BODY MASS INDEX (BMI) DOCUMENTED: ICD-10-PCS | Mod: CPTII,S$GLB,, | Performed by: INTERNAL MEDICINE

## 2023-10-27 PROCEDURE — 3080F PR MOST RECENT DIASTOLIC BLOOD PRESSURE >= 90 MM HG: ICD-10-PCS | Mod: CPTII,S$GLB,, | Performed by: INTERNAL MEDICINE

## 2023-10-27 PROCEDURE — 3077F SYST BP >= 140 MM HG: CPT | Mod: CPTII,S$GLB,, | Performed by: NURSE PRACTITIONER

## 2023-10-27 PROCEDURE — 99204 PR OFFICE/OUTPT VISIT, NEW, LEVL IV, 45-59 MIN: ICD-10-PCS | Mod: S$GLB,,, | Performed by: INTERNAL MEDICINE

## 2023-10-27 PROCEDURE — 3077F SYST BP >= 140 MM HG: CPT | Mod: CPTII,S$GLB,, | Performed by: INTERNAL MEDICINE

## 2023-10-27 PROCEDURE — 3077F PR MOST RECENT SYSTOLIC BLOOD PRESSURE >= 140 MM HG: ICD-10-PCS | Mod: CPTII,S$GLB,, | Performed by: INTERNAL MEDICINE

## 2023-10-27 PROCEDURE — 99999 PR PBB SHADOW E&M-EST. PATIENT-LVL IV: CPT | Mod: PBBFAC,,, | Performed by: INTERNAL MEDICINE

## 2023-10-27 PROCEDURE — 99213 PR OFFICE/OUTPT VISIT, EST, LEVL III, 20-29 MIN: ICD-10-PCS | Mod: 95,,, | Performed by: FAMILY MEDICINE

## 2023-10-27 PROCEDURE — 3008F BODY MASS INDEX DOCD: CPT | Mod: CPTII,S$GLB,, | Performed by: NURSE PRACTITIONER

## 2023-10-27 PROCEDURE — 87624 HPV HI-RISK TYP POOLED RSLT: CPT | Performed by: NURSE PRACTITIONER

## 2023-10-27 PROCEDURE — 3080F DIAST BP >= 90 MM HG: CPT | Mod: CPTII,S$GLB,, | Performed by: INTERNAL MEDICINE

## 2023-10-27 PROCEDURE — 1160F PR REVIEW ALL MEDS BY PRESCRIBER/CLIN PHARMACIST DOCUMENTED: ICD-10-PCS | Mod: CPTII,S$GLB,, | Performed by: INTERNAL MEDICINE

## 2023-10-27 PROCEDURE — 99999 PR PBB SHADOW E&M-EST. PATIENT-LVL IV: ICD-10-PCS | Mod: PBBFAC,,, | Performed by: NURSE PRACTITIONER

## 2023-10-27 PROCEDURE — 88141 CYTOPATH C/V INTERPRET: CPT | Mod: ,,, | Performed by: PATHOLOGY

## 2023-10-27 PROCEDURE — 81003 PR URINALYSIS, AUTO, W/O SCOPE: ICD-10-PCS | Mod: QW,S$GLB,, | Performed by: NURSE PRACTITIONER

## 2023-10-27 PROCEDURE — 99395 PR PREVENTIVE VISIT,EST,18-39: ICD-10-PCS | Mod: S$GLB,,, | Performed by: NURSE PRACTITIONER

## 2023-10-27 PROCEDURE — 3080F DIAST BP >= 90 MM HG: CPT | Mod: CPTII,S$GLB,, | Performed by: NURSE PRACTITIONER

## 2023-10-27 PROCEDURE — 99999 PR PBB SHADOW E&M-EST. PATIENT-LVL IV: ICD-10-PCS | Mod: PBBFAC,,, | Performed by: INTERNAL MEDICINE

## 2023-10-27 PROCEDURE — 1159F PR MEDICATION LIST DOCUMENTED IN MEDICAL RECORD: ICD-10-PCS | Mod: CPTII,S$GLB,, | Performed by: INTERNAL MEDICINE

## 2023-10-27 PROCEDURE — 88175 CYTOPATH C/V AUTO FLUID REDO: CPT | Performed by: PATHOLOGY

## 2023-10-27 PROCEDURE — 99204 OFFICE O/P NEW MOD 45 MIN: CPT | Mod: S$GLB,,, | Performed by: INTERNAL MEDICINE

## 2023-10-27 PROCEDURE — 3008F PR BODY MASS INDEX (BMI) DOCUMENTED: ICD-10-PCS | Mod: CPTII,S$GLB,, | Performed by: NURSE PRACTITIONER

## 2023-10-27 PROCEDURE — 99999 PR PBB SHADOW E&M-EST. PATIENT-LVL IV: CPT | Mod: PBBFAC,,, | Performed by: NURSE PRACTITIONER

## 2023-10-27 PROCEDURE — 99395 PREV VISIT EST AGE 18-39: CPT | Mod: S$GLB,,, | Performed by: NURSE PRACTITIONER

## 2023-10-27 PROCEDURE — 3077F PR MOST RECENT SYSTOLIC BLOOD PRESSURE >= 140 MM HG: ICD-10-PCS | Mod: CPTII,S$GLB,, | Performed by: NURSE PRACTITIONER

## 2023-10-27 PROCEDURE — 1159F MED LIST DOCD IN RCRD: CPT | Mod: CPTII,S$GLB,, | Performed by: INTERNAL MEDICINE

## 2023-10-27 PROCEDURE — 81003 URINALYSIS AUTO W/O SCOPE: CPT | Mod: QW,S$GLB,, | Performed by: NURSE PRACTITIONER

## 2023-10-27 PROCEDURE — 3080F PR MOST RECENT DIASTOLIC BLOOD PRESSURE >= 90 MM HG: ICD-10-PCS | Mod: CPTII,S$GLB,, | Performed by: NURSE PRACTITIONER

## 2023-10-27 RX ORDER — HYDROCHLOROTHIAZIDE 25 MG/1
25 TABLET ORAL DAILY
Qty: 30 TABLET | Refills: 11 | Status: SHIPPED | OUTPATIENT
Start: 2023-10-27

## 2023-10-27 NOTE — PROGRESS NOTES
Subjective:   Patient ID:  Smith Love is a 32 y.o. 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 Had some panic.   No dizziness faint and chest pain. No fatigue and SOB. No orthopnea.   Snores. Occasional drinking. Coffee. 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    /100 mmHG        No results found for this or any previous visit.     No results found for this or any previous visit.       Past Medical History:   Diagnosis Date    Asthma     COVID-19 08/2020    Essential hypertension 04/27/2017    Right knee injury 07/27/2020    Staph aureus infection        History reviewed. No pertinent surgical history.    Social History     Tobacco Use    Smoking status: Never     Passive exposure: Never    Smokeless tobacco: Never   Substance Use Topics    Alcohol use: Yes     Alcohol/week: 1.0 standard drink of alcohol     Types: 1 Glasses of wine per week     Comment: socially    Drug use: No       Family History   Problem Relation Age of Onset    No Known Problems Mother     Hypertension Father     Asthma Father     Heart failure Paternal Uncle     Hypertension Maternal Grandmother     Miscarriages / Stillbirths Maternal Grandmother        Review of Systems   Constitutional: Negative for decreased appetite, diaphoresis, fever, malaise/fatigue and night sweats.   HENT:  Negative for nosebleeds.    Eyes:  Negative for blurred vision and double vision.   Cardiovascular:  Negative for chest pain, claudication, dyspnea on exertion, irregular heartbeat, leg swelling, near-syncope, orthopnea, palpitations, paroxysmal nocturnal dyspnea and  syncope.   Respiratory:  Positive for snoring. Negative for cough, shortness of breath, sleep disturbances due to breathing, sputum production and wheezing.    Endocrine: Negative for cold intolerance and polyuria.   Hematologic/Lymphatic: Does not bruise/bleed easily.   Skin:  Negative for rash.   Musculoskeletal:  Negative for back pain, falls, joint pain, joint swelling and neck pain.   Gastrointestinal:  Negative for abdominal pain, heartburn, nausea and vomiting.   Genitourinary:  Negative for dysuria, frequency and hematuria.   Neurological:  Negative for difficulty with concentration, dizziness, focal weakness, headaches, light-headedness, numbness, seizures and weakness.   Psychiatric/Behavioral:  Negative for depression, memory loss and substance abuse. The patient does not have insomnia.    Allergic/Immunologic: Negative for HIV exposure and hives.       Objective:   Physical Exam  HENT:      Head: Normocephalic.   Eyes:      Pupils: Pupils are equal, round, and reactive to light.   Neck:      Thyroid: No thyromegaly.      Vascular: Normal carotid pulses. No carotid bruit or JVD.   Cardiovascular:      Rate and Rhythm: Normal rate and regular rhythm. No extrasystoles are present.     Chest Wall: PMI is not displaced.      Pulses: Normal pulses.      Heart sounds: Normal heart sounds. No murmur heard.     No gallop. No S3 sounds.   Pulmonary:      Effort: No respiratory distress.      Breath sounds: Normal breath sounds. No stridor.   Abdominal:      General: Bowel sounds are normal.      Palpations: Abdomen is soft.      Tenderness: There is no abdominal tenderness. There is no rebound.   Musculoskeletal:         General: Normal range of motion.   Skin:     Findings: No rash.   Neurological:      Mental Status: She is alert and oriented to person, place, and time.   Psychiatric:         Behavior: Behavior normal.         Lab Results   Component Value Date    CHOL 106 (L) 03/12/2021    CHOL 109 (L)  "08/29/2018     Lab Results   Component Value Date    HDL 42 03/12/2021    HDL 33 (L) 08/29/2018     Lab Results   Component Value Date    LDLCALC 58.0 (L) 03/12/2021    LDLCALC 67.2 08/29/2018     Lab Results   Component Value Date    TRIG 30 03/12/2021    TRIG 44 08/29/2018     Lab Results   Component Value Date    CHOLHDL 39.6 03/12/2021    CHOLHDL 30.3 08/29/2018       Chemistry        Component Value Date/Time     10/26/2023 1140    K 4.0 10/26/2023 1140     10/26/2023 1140    CO2 20 (L) 10/26/2023 1140    BUN 13 10/26/2023 1140    CREATININE 0.9 10/26/2023 1140     (H) 10/26/2023 1140        Component Value Date/Time    CALCIUM 8.7 10/26/2023 1140    ALKPHOS 79 10/26/2023 1140    AST 9 (L) 10/26/2023 1140    ALT 17 10/26/2023 1140    BILITOT 0.8 10/26/2023 1140    ESTGFRAFRICA >60.0 03/12/2021 1017    EGFRNONAA >60.0 03/12/2021 1017          Lab Results   Component Value Date    HGBA1C 4.7 03/12/2021     Lab Results   Component Value Date    TSH 2.418 10/26/2023     No results found for: "INR", "PROTIME"  Lab Results   Component Value Date    WBC 10.70 10/26/2023    HGB 14.6 10/26/2023    HCT 42.2 10/26/2023    MCV 86 10/26/2023     10/26/2023     BNP  @LABRCNTIP(BNP,BNPTRIAGEBLO)@  Estimated Creatinine Clearance: 104.4 mL/min (based on SCr of 0.9 mg/dL).  No results found in the last 24 hours.  No results found in the last 24 hours.  No results found in the last 24 hours.    Assessment:      1. Primary hypertension    2. EKG abnormalities    3. Bradycardia        Plan:   Echo for HTN and abnl EKG  BARDY for reported bradycardia  ADD HCTZ 25 MG FOR HTN  CONTINUE AMLODIPINE   CHECK BP AT HOME   Counseled DASH  Check Lipid profile with PCP in 6 months  Recommend heart-healthy diet, weight control and regular exercise.  Miguelina. Risk modification.   I have reviewed all pertinent labs and cardiac studies independently. Plans and recommendations have been formulated under my direct " supervision. All questions answered and patient voiced understanding.   If symptoms persist go to the ED  F/U WITH PCP

## 2023-10-27 NOTE — PROGRESS NOTES
Smith Love    Chief Complaint   Patient presents with    Hypertension       History of Present Illness:   The patient location is: [place] (in car)  The chief complaint leading to consultation is: Hypertension    Visit type: audiovisual    Face to Face time with patient: 25 of 25 minutes of total time spent on the encounter, which includes face to face time and non-face to face time preparing to see the patient (eg, review of tests), Obtaining and/or reviewing separately obtained history, Documenting clinical information in the electronic or other health record, Independently interpreting results (not separately reported) and communicating results to the patient/family/caregiver, or Care coordination (not separately reported).         Each patient to whom he or she provides medical services by telemedicine is:  (1) informed of the relationship between the physician and patient and the respective role of any other health care provider with respect to management of the patient; and (2) notified that he or she may decline to receive medical services by telemedicine and may withdraw from such care at any time.    Notes:      Ms. Love visit with me today. She states she went to ER for low heart rate and was advised she had normal heart rate but had elevated blood pressure while there. She states she is concerned about EKG reading and fasting high glucose noted on Patient Portal. She states she thought abnormal findings were related to asthma as she recently had visit with Urgent Care and was prescribed Medrol dose pack, which she states she finishes taking in 2 days. She states she saw GYN today with urine glucose noted. She states she has appointment with cardiologist Dr. Powers today.    She states she has not exercised in 2 weeks.    Otherwise, she denies having fever, chills, fatigue, appetite changes; shortness of breath, cough; chest pain, leg swelling; abdominal pain, nausea, vomiting, diarrhea,  constipation; unusual urinary symptoms; polydipsia, polyphagia, polyuria, hot or cold intolerance; back pain; occasional headache; anxiety, homicidal or suicidal thoughts.              Current Outpatient Medications   Medication Sig    albuterol (PROVENTIL) 2.5 mg /3 mL (0.083 %) nebulizer solution Take 3 mLs (2.5 mg total) by nebulization every 4 to 6 hours as needed for Wheezing or Shortness of Breath.    albuterol (VENTOLIN HFA) 90 mcg/actuation inhaler Inhale 2 puffs into the lungs every 6 (six) hours as needed for Wheezing. Inhaler    amLODIPine (NORVASC) 5 MG tablet Take 1 tablet (5 mg total) by mouth once daily.    ascorbic acid, vitamin C, (VITAMIN C) 500 MG tablet Take 500 mg by mouth once daily.    azelastine (ASTELIN) 137 mcg (0.1 %) nasal spray 1 spray (137 mcg total) by Nasal route 2 (two) times daily.    butalbital-acetaminophen-caffeine -40 mg (FIORICET, ESGIC) -40 mg per tablet Take 1 tablet by mouth every 4 (four) hours as needed for Pain.    fluticasone-salmeterol diskus inhaler 250-50 mcg Inhale 1 puff into the lungs 2 (two) times daily. Controller    ibuprofen (ADVIL,MOTRIN) 800 MG tablet Take 1 tablet (800 mg total) by mouth 3 (three) times daily.    montelukast (SINGULAIR) 10 mg tablet Take 1 tablet (10 mg total) by mouth every evening.    zinc gluconate 50 mg tablet Take 50 mg by mouth once daily.    diclofenac sodium (VOLTAREN) 1 % Gel Apply 2 g topically 4 (four) times daily. for 10 days    folic acid (FOLVITE) 400 MCG tablet Take 400 mcg by mouth once daily.         Review of Systems   Constitutional:  Negative for activity change, appetite change, chills, fatigue, fever and unexpected weight change.   HENT:  Negative for hearing loss, rhinorrhea and trouble swallowing.    Eyes:  Negative for discharge and visual disturbance.   Respiratory:  Positive for wheezing. Negative for cough and shortness of breath.    Cardiovascular:  Positive for palpitations. Negative for chest pain  and leg swelling.   Gastrointestinal:  Negative for abdominal pain, blood in stool, constipation, diarrhea and vomiting.   Endocrine: Negative for polydipsia and polyuria.   Genitourinary:  Negative for difficulty urinating, dysuria, hematuria and menstrual problem.   Musculoskeletal:  Negative for arthralgias, joint swelling and neck pain.   Neurological:  Positive for headaches.   Psychiatric/Behavioral:  Positive for dysphoric mood. Negative for confusion and suicidal ideas. The patient is not nervous/anxious.        Objective:  Physical Exam  Vitals reviewed.   Constitutional:       General: She is not in acute distress.     Appearance: Normal appearance. She is obese. She is not ill-appearing, toxic-appearing or diaphoretic.      Comments: Pleasant.   Pulmonary:      Effort: Pulmonary effort is normal. No respiratory distress.   Musculoskeletal:         General: Normal range of motion.      Comments: She moves around her space without problems during virtual visit.   Neurological:      General: No focal deficit present.      Mental Status: She is alert and oriented to person, place, and time.   Psychiatric:         Mood and Affect: Mood normal.         Behavior: Behavior normal.         Thought Content: Thought content normal.         Judgment: Judgment normal.         ASSESSMENT:  1. Elevated glucose    2. Bradycardia    3. Hypertension, unspecified type        PLAN:  Smith was seen today for hypertension.    Diagnoses and all orders for this visit:    Elevated glucose  -     Hemoglobin A1C; Future  -     Basic Metabolic Panel; Future    Bradycardia    Hypertension, unspecified type      Patient advised to have fasting A1c, glucose in 1 month (w/steroids out of her system) and call me for results.  Continue current medications, follow low sodium, low cholesterol, low carb diet, daily walks.  Keep follow up with specialists.  See me sooner if no improvement or worsening symptoms noted.

## 2023-10-27 NOTE — TELEPHONE ENCOUNTER
Returned pt's phone call. Pt verified name and ; answered her questions about urine color, glucose and protein.  Pt is fasting.  Verbalized understanding. GEORGES Sylvester-BC

## 2023-10-27 NOTE — PROGRESS NOTES
Subjective:       Patient ID: Smith Love is a 32 y.o. female.    Chief Complaint:  Well Woman      History of Present Illness  HPI  Annual Exam-Premenopausal  G0 presents for annual exam.  The patient is sexually active with her ; they have been trying to conceive for almost a year. Positive OPKs.  They took a break from tests for a while.   did see a urology in July or August -- low sperm count.  Has not followed up for rpt count.    GYN screening history: last pap: approximate date 2020 and was normal. The patient wears seatbelts: yes. The patient participates in regular exercise: yes. Has the patient ever been transfused or tattooed?: no. The patient reports that there is not domestic violence in her life.    Glucose 237 yesterday in ED and bradycardia-- fasting; currently taking dose pack for URI -- old rx she had at home.    28-29d cycles; menses 4-5d; one heavy day in the middle; pads-overnight changing 3-4x/day for cleanliness; no clots; mild cramping sometimes; mainly back aches.  Heating pads; sometimes takes tylenol. Heat helps most.    Has gained about 20# in the last year.  No bladder or bowel issues.  Experiencing low back and abdominal cramping; possibly PMS.    GYN & OB History  Patient's last menstrual period was 10/06/2023.   Date of Last Pap: 10/28/2023    OB History    Para Term  AB Living   0 0 0 0 0 0   SAB IAB Ectopic Multiple Live Births   0 0 0 0 0       Review of Systems  Review of Systems   Constitutional:  Positive for unexpected weight change. Negative for activity change, appetite change, chills, fatigue and fever.   HENT:  Negative for nasal congestion and mouth sores.    Respiratory:  Negative for cough, shortness of breath and wheezing.    Cardiovascular:  Negative for chest pain.   Gastrointestinal:  Negative for abdominal pain, constipation, diarrhea, nausea and vomiting.   Endocrine: Negative for hair loss and hot flashes.    Genitourinary:  Positive for dysmenorrhea and menorrhagia. Negative for bladder incontinence, decreased libido, dyspareunia, dysuria, frequency, genital sores, hematuria, hot flashes, menstrual problem, pelvic pain, urgency, vaginal discharge, vaginal pain, urinary incontinence, postcoital bleeding, postmenopausal bleeding, vaginal dryness and vaginal odor.   Musculoskeletal:  Negative for back pain.   Integumentary:  Negative for breast mass, nipple discharge, breast skin changes and breast tenderness.   Neurological:  Negative for headaches.   Hematological:  Negative for adenopathy.   Psychiatric/Behavioral:  Negative for depression and sleep disturbance. The patient is nervous/anxious.    All other systems reviewed and are negative.  Breast: Negative for breast self exam, lump, mass, mastodynia, nipple discharge, skin changes and tenderness          Objective:      Physical Exam:   Constitutional: She is oriented to person, place, and time. She appears well-developed and well-nourished. No distress.    HENT:   Head: Normocephalic and atraumatic.   Nose: Nose normal.    Eyes: Pupils are equal, round, and reactive to light. Conjunctivae and EOM are normal. Right eye exhibits no discharge. Left eye exhibits no discharge.     Cardiovascular:  Normal rate, regular rhythm and normal heart sounds.      Exam reveals no gallop, no friction rub, no clubbing, no cyanosis and no edema.       No murmur heard.   Pulmonary/Chest: Effort normal and breath sounds normal. No respiratory distress. She has no decreased breath sounds. She has no wheezes. She has no rhonchi. She has no rales. She exhibits no tenderness. Right breast exhibits no inverted nipple, no mass, no nipple discharge, no skin change, no tenderness, no bleeding and no swelling. Left breast exhibits no inverted nipple, no mass, no nipple discharge, no skin change, no tenderness, no bleeding and no swelling. Breasts are symmetrical.        Abdominal: Soft. Bowel  sounds are normal. She exhibits no distension. There is no abdominal tenderness. There is no rebound and no guarding. Hernia confirmed negative in the right inguinal area and confirmed negative in the left inguinal area.     Genitourinary:    Inguinal canal, uterus, right adnexa and left adnexa normal.   Rectum:      No external hemorrhoid.      Pelvic exam was performed with patient in the lithotomy position.   The external female genitalia was normal.   No external genitalia lesions identified,Genitalia hair distrobution normal .   Labial bartholins normal.There is no rash, tenderness, lesion or injury on the right labia. There is no rash, tenderness, lesion or injury on the left labia. Cervix is normal. Right adnexum displays no mass, no tenderness and no fullness. Left adnexum displays no mass, no tenderness and no fullness. There is vaginal discharge (white) in the vagina. No erythema, tenderness or bleeding in the vagina.    No foreign body in the vagina.      No signs of injury in the vagina.   Vagina was moist.Cervix exhibits no motion tenderness, no lesion, no discharge, no friability, no lesion, no tenderness and no polyp.    pap smear completedUerus contour normal  Uterus is not enlarged and not tender. Uterus size: 8 cm.Normal urethral meatus.Urethral Meatus exhibits: urethral lesion and prolapsedUrethra findings: no urethral mass, no tenderness and no urethral scarringBladder findings: no bladder distention and no bladder tenderness          Musculoskeletal: Normal range of motion and moves all extremeties.      Lymphadenopathy: No inguinal adenopathy noted on the right or left side.    Neurological: She is alert and oriented to person, place, and time.    Skin: Skin is warm and dry. No rash noted. She is not diaphoretic. No cyanosis or erythema. No pallor. Nails show no clubbing.    Psychiatric: She has a normal mood and affect. Her speech is normal and behavior is normal. Judgment and thought content  normal.             Assessment:        1. Encounter for gynecological examination without abnormal finding    2. Encounter for preconception consultation    3. Cervical cancer screening    4. Acute midline low back pain without sciatica               Plan:   U/a collected -- + glucose; has f/u scheduled with PCP  F/u with urology  F/u with OB/GYN after that  Labs ordered -- will collect day 21    Reviewed updated recommendations for pap smears (every 3 years) in low risk patients.  Recommend annual pelvic exams.  Reviewed recommendations for annual CBE.  Next pap due in 2026.   RTC 1 year or sooner prn.  To PCP for other health maintenance.      Encounter for gynecological examination without abnormal finding  -     Liquid-Based Pap Smear, Screening  -     HPV High Risk Genotypes, PCR    Encounter for preconception consultation  -     PROGESTERONE; Future; Expected date: 10/27/2023  -     Estradiol; Future; Expected date: 10/27/2023  -     Follicle Stimulating Hormone; Future  -     Luteinizing Hormone; Future; Expected date: 11/03/2023  -     TESTOSTERONE; Future; Expected date: 10/27/2023  -     DHEA-Sulfate; Future; Expected date: 10/27/2023    Cervical cancer screening  -     Liquid-Based Pap Smear, Screening  -     HPV High Risk Genotypes, PCR    Acute midline low back pain without sciatica  -     POCT URINE DIPSTICK WITHOUT MICROSCOPE

## 2023-10-28 PROBLEM — R00.1 BRADYCARDIA: Status: ACTIVE | Noted: 2023-10-28

## 2023-10-30 ENCOUNTER — TELEPHONE (OUTPATIENT)
Dept: CARDIOLOGY | Facility: HOSPITAL | Age: 33
End: 2023-10-30
Payer: COMMERCIAL

## 2023-10-31 ENCOUNTER — PATIENT MESSAGE (OUTPATIENT)
Dept: OBSTETRICS AND GYNECOLOGY | Facility: CLINIC | Age: 33
End: 2023-10-31
Payer: COMMERCIAL

## 2023-10-31 LAB
HPV HR 12 DNA SPEC QL NAA+PROBE: POSITIVE
HPV16 AG SPEC QL: NEGATIVE
HPV18 DNA SPEC QL NAA+PROBE: NEGATIVE

## 2023-11-06 ENCOUNTER — PATIENT MESSAGE (OUTPATIENT)
Dept: CARDIOLOGY | Facility: HOSPITAL | Age: 33
End: 2023-11-06
Payer: COMMERCIAL

## 2023-11-06 ENCOUNTER — OFFICE VISIT (OUTPATIENT)
Dept: OBSTETRICS AND GYNECOLOGY | Facility: CLINIC | Age: 33
End: 2023-11-06
Payer: COMMERCIAL

## 2023-11-06 DIAGNOSIS — R87.612 LGSIL ON PAP SMEAR OF CERVIX: Primary | ICD-10-CM

## 2023-11-06 DIAGNOSIS — B97.7 HPV IN FEMALE: ICD-10-CM

## 2023-11-06 LAB
FINAL PATHOLOGIC DIAGNOSIS: ABNORMAL
Lab: ABNORMAL

## 2023-11-06 PROCEDURE — 1159F PR MEDICATION LIST DOCUMENTED IN MEDICAL RECORD: ICD-10-PCS | Mod: CPTII,95,, | Performed by: NURSE PRACTITIONER

## 2023-11-06 PROCEDURE — 99213 PR OFFICE/OUTPT VISIT, EST, LEVL III, 20-29 MIN: ICD-10-PCS | Mod: 95,,, | Performed by: NURSE PRACTITIONER

## 2023-11-06 PROCEDURE — 1160F PR REVIEW ALL MEDS BY PRESCRIBER/CLIN PHARMACIST DOCUMENTED: ICD-10-PCS | Mod: CPTII,95,, | Performed by: NURSE PRACTITIONER

## 2023-11-06 PROCEDURE — 1159F MED LIST DOCD IN RCRD: CPT | Mod: CPTII,95,, | Performed by: NURSE PRACTITIONER

## 2023-11-06 PROCEDURE — 99213 OFFICE O/P EST LOW 20 MIN: CPT | Mod: 95,,, | Performed by: NURSE PRACTITIONER

## 2023-11-06 PROCEDURE — 1160F RVW MEDS BY RX/DR IN RCRD: CPT | Mod: CPTII,95,, | Performed by: NURSE PRACTITIONER

## 2023-11-06 NOTE — PROGRESS NOTES
Subjective:       Patient ID: Smith Love is a 32 y.o. female.    Chief Complaint:  Abnormal Pap Smear      History of Present Illness  HPI  The patient location is: [place], [place] at home  The chief complaint leading to consultation is: abnormal pap    Visit type: audiovisual    Face to Face time with patient: 15 minutes of total time spent on the encounter, which includes face to face time and non-face to face time preparing to see the patient (eg, review of tests), Obtaining and/or reviewing separately obtained history, Documenting clinical information in the electronic or other health record, Independently interpreting results (not separately reported) and communicating results to the patient/family/caregiver, or Care coordination (not separately reported).         Each patient to whom he or she provides medical services by telemedicine is:  (1) informed of the relationship between the physician and patient and the respective role of any other health care provider with respect to management of the patient; and (2) notified that he or she may decline to receive medical services by telemedicine and may withdraw from such care at any time.    Notes:   G0 present for abnormal pap  LSIL, + other types of HPV  Has completed gardasil    GYN & OB History  Patient's last menstrual period was 2023 (exact date).   Date of Last Pap: 2023    OB History    Para Term  AB Living   0 0 0 0 0 0   SAB IAB Ectopic Multiple Live Births   0 0 0 0 0       Review of Systems  Review of Systems   Genitourinary:  Negative for menstrual problem.           Objective:      Physical Exam:   Constitutional: She is oriented to person, place, and time. She appears well-developed and well-nourished. No distress.    HENT:   Head: Normocephalic and atraumatic.    Eyes: Pupils are equal, round, and reactive to light. Conjunctivae and EOM are normal.      Pulmonary/Chest: Effort normal.                 "  Musculoskeletal: Normal range of motion.       Neurological: She is alert and oriented to person, place, and time.    Skin: She is not diaphoretic.    Psychiatric: She has a normal mood and affect. Her behavior is normal. Judgment and thought content normal.           Assessment:        1. LGSIL on Pap smear of cervix    2. HPV in female               Plan:   Discussed pap results with pt in detail  Discussed colpo procedure  Instructed to take NSAID when on the way    The patient is given a full explanation of the ubiquitous nature of HPV.  Some research suggests that at least three out of four people who have sex will get a genital HPV infection at some time in their lives.  Sexual contact with an infected partner, regardless of the sex of the partner, is the most common way the virus is spread.  Most often there are no signs or symptoms of genital HPV infection.  Low risk viruses can cause genital warts, but they do not cause abnormal pap smears.  High risk viruses can cause abnormal pap smears, but they do not cause genital warts.  In most women, the immune system destroys the virus before it causes cancer.  But in some women, HPV is not destroyed and can lead to precancer or cancer in those women.  There is no "cure" for HPV.  Smoking, however, can make it more difficult for your body to clear the virus.    Continue annual well woman exam.    LGSIL on Pap smear of cervix    HPV in female             "

## 2023-11-09 ENCOUNTER — OFFICE VISIT (OUTPATIENT)
Dept: FAMILY MEDICINE | Facility: CLINIC | Age: 33
End: 2023-11-09
Payer: COMMERCIAL

## 2023-11-09 ENCOUNTER — LAB VISIT (OUTPATIENT)
Dept: LAB | Facility: HOSPITAL | Age: 33
End: 2023-11-09
Attending: NURSE PRACTITIONER
Payer: COMMERCIAL

## 2023-11-09 VITALS
RESPIRATION RATE: 18 BRPM | HEART RATE: 97 BPM | HEIGHT: 66 IN | OXYGEN SATURATION: 98 % | DIASTOLIC BLOOD PRESSURE: 85 MMHG | WEIGHT: 204.38 LBS | TEMPERATURE: 98 F | BODY MASS INDEX: 32.85 KG/M2 | SYSTOLIC BLOOD PRESSURE: 120 MMHG

## 2023-11-09 DIAGNOSIS — Z00.00 HEALTHCARE MAINTENANCE: ICD-10-CM

## 2023-11-09 DIAGNOSIS — I10 HYPERTENSION, UNSPECIFIED TYPE: ICD-10-CM

## 2023-11-09 DIAGNOSIS — J34.89 SINUS PRESSURE: Primary | ICD-10-CM

## 2023-11-09 DIAGNOSIS — F41.9 ANXIETY: ICD-10-CM

## 2023-11-09 LAB
ESTIMATED AVG GLUCOSE: 189 MG/DL (ref 68–131)
HBA1C MFR BLD: 8.2 % (ref 4–5.6)

## 2023-11-09 PROCEDURE — 99999 PR PBB SHADOW E&M-EST. PATIENT-LVL V: ICD-10-PCS | Mod: PBBFAC,,, | Performed by: NURSE PRACTITIONER

## 2023-11-09 PROCEDURE — 3008F PR BODY MASS INDEX (BMI) DOCUMENTED: ICD-10-PCS | Mod: CPTII,S$GLB,, | Performed by: NURSE PRACTITIONER

## 2023-11-09 PROCEDURE — 1160F RVW MEDS BY RX/DR IN RCRD: CPT | Mod: CPTII,S$GLB,, | Performed by: NURSE PRACTITIONER

## 2023-11-09 PROCEDURE — 83036 HEMOGLOBIN GLYCOSYLATED A1C: CPT | Performed by: NURSE PRACTITIONER

## 2023-11-09 PROCEDURE — 99999 PR PBB SHADOW E&M-EST. PATIENT-LVL V: CPT | Mod: PBBFAC,,, | Performed by: NURSE PRACTITIONER

## 2023-11-09 PROCEDURE — 36415 COLL VENOUS BLD VENIPUNCTURE: CPT | Mod: PO | Performed by: NURSE PRACTITIONER

## 2023-11-09 PROCEDURE — 3074F PR MOST RECENT SYSTOLIC BLOOD PRESSURE < 130 MM HG: ICD-10-PCS | Mod: CPTII,S$GLB,, | Performed by: NURSE PRACTITIONER

## 2023-11-09 PROCEDURE — 3074F SYST BP LT 130 MM HG: CPT | Mod: CPTII,S$GLB,, | Performed by: NURSE PRACTITIONER

## 2023-11-09 PROCEDURE — 1159F MED LIST DOCD IN RCRD: CPT | Mod: CPTII,S$GLB,, | Performed by: NURSE PRACTITIONER

## 2023-11-09 PROCEDURE — 99214 OFFICE O/P EST MOD 30 MIN: CPT | Mod: S$GLB,,, | Performed by: NURSE PRACTITIONER

## 2023-11-09 PROCEDURE — 99214 PR OFFICE/OUTPT VISIT, EST, LEVL IV, 30-39 MIN: ICD-10-PCS | Mod: S$GLB,,, | Performed by: NURSE PRACTITIONER

## 2023-11-09 PROCEDURE — 1160F PR REVIEW ALL MEDS BY PRESCRIBER/CLIN PHARMACIST DOCUMENTED: ICD-10-PCS | Mod: CPTII,S$GLB,, | Performed by: NURSE PRACTITIONER

## 2023-11-09 PROCEDURE — 3079F DIAST BP 80-89 MM HG: CPT | Mod: CPTII,S$GLB,, | Performed by: NURSE PRACTITIONER

## 2023-11-09 PROCEDURE — 3079F PR MOST RECENT DIASTOLIC BLOOD PRESSURE 80-89 MM HG: ICD-10-PCS | Mod: CPTII,S$GLB,, | Performed by: NURSE PRACTITIONER

## 2023-11-09 PROCEDURE — 1159F PR MEDICATION LIST DOCUMENTED IN MEDICAL RECORD: ICD-10-PCS | Mod: CPTII,S$GLB,, | Performed by: NURSE PRACTITIONER

## 2023-11-09 PROCEDURE — 3008F BODY MASS INDEX DOCD: CPT | Mod: CPTII,S$GLB,, | Performed by: NURSE PRACTITIONER

## 2023-11-09 NOTE — PROGRESS NOTES
Smith Love  11/09/2023  [id number]    Caro Hernandez MD  Patient Care Team:  Caro Hernandez MD as PCP - General (Family Medicine)  rAlet Mcclelland MD as PCP - Family Medicine (Family Medicine)  Vannessa Dempsey NP as Nurse Practitioner (Family Medicine)          Visit Type:a scheduled routine follow-up visit    Chief Complaint:  Chief Complaint   Patient presents with    Follow-up       History of Present Illness:        History:  Past Medical History:   Diagnosis Date    Asthma     COVID-19 08/2020    Essential hypertension 04/27/2017    LGSIL on Pap smear of cervix 11/6/2023    Right knee injury 07/27/2020    Staph aureus infection      History reviewed. No pertinent surgical history.  Family History   Problem Relation Age of Onset    No Known Problems Mother     Hypertension Father     Asthma Father     Heart failure Paternal Uncle     Hypertension Maternal Grandmother     Miscarriages / Stillbirths Maternal Grandmother      Social History     Socioeconomic History    Marital status: Single   Tobacco Use    Smoking status: Never     Passive exposure: Never    Smokeless tobacco: Never   Substance and Sexual Activity    Alcohol use: Yes     Alcohol/week: 1.0 standard drink of alcohol     Types: 1 Glasses of wine per week     Comment: socially    Drug use: No    Sexual activity: Yes     Partners: Male     Birth control/protection: None     Social Determinants of Health     Financial Resource Strain: Low Risk  (11/6/2023)    Overall Financial Resource Strain (CARDIA)     Difficulty of Paying Living Expenses: Not hard at all   Food Insecurity: No Food Insecurity (11/6/2023)    Hunger Vital Sign     Worried About Running Out of Food in the Last Year: Never true     Ran Out of Food in the Last Year: Never true   Transportation Needs: No Transportation Needs (11/6/2023)    PRAPARE - Transportation     Lack of Transportation (Medical): No     Lack of Transportation (Non-Medical): No   Physical  Activity: Insufficiently Active (11/6/2023)    Exercise Vital Sign     Days of Exercise per Week: 3 days     Minutes of Exercise per Session: 30 min   Stress: Stress Concern Present (11/6/2023)    [employer] [place] [employer] - Occupational Stress Questionnaire     Feeling of Stress : Rather much   Social Connections: Unknown (11/6/2023)    Social Connection and Isolation Panel [NHANES]     Frequency of Communication with Friends and Family: More than three times a week     Frequency of Social Gatherings with Friends and Family: Once a week     Active Member of Clubs or Organizations: No     Attends Club or Organization Meetings: Never     Marital Status: Living with partner   Housing Stability: Low Risk  (11/6/2023)    Housing Stability Vital Sign     Unable to Pay for Housing in the Last Year: No     Number of Places Lived in the Last Year: 1     Unstable Housing in the Last Year: No     Patient Active Problem List   Diagnosis    Asthma, mild intermittent    Hypertension    Seasonal allergic rhinitis due to pollen    Anxiety    EKG abnormalities    Bradycardia    HPV in female    LGSIL on Pap smear of cervix     Review of patient's allergies indicates:  No Known Allergies    The following were reviewed at this visit: active problem list, medication list, allergies, family history, social history, and health maintenance.    Medications:  Current Outpatient Medications on File Prior to Visit   Medication Sig Dispense Refill    albuterol (PROVENTIL) 2.5 mg /3 mL (0.083 %) nebulizer solution Take 3 mLs (2.5 mg total) by nebulization every 4 to 6 hours as needed for Wheezing or Shortness of Breath. 250 mL 0    albuterol (VENTOLIN HFA) 90 mcg/actuation inhaler Inhale 2 puffs into the lungs every 6 (six) hours as needed for Wheezing. Inhaler 18 g 1    amLODIPine (NORVASC) 5 MG tablet Take 1 tablet (5 mg total) by mouth once daily. 90 tablet 1    azelastine (ASTELIN) 137 mcg (0.1 %) nasal spray 1 spray (137  mcg total) by Nasal route 2 (two) times daily. 30 mL 2    fluticasone-salmeterol diskus inhaler 250-50 mcg Inhale 1 puff into the lungs 2 (two) times daily. Controller 180 each 5    ibuprofen (ADVIL,MOTRIN) 800 MG tablet Take 1 tablet (800 mg total) by mouth 3 (three) times daily. 30 tablet 5    montelukast (SINGULAIR) 10 mg tablet Take 1 tablet (10 mg total) by mouth every evening. 90 tablet 3    zinc gluconate 50 mg tablet Take 50 mg by mouth once daily.      ascorbic acid, vitamin C, (VITAMIN C) 500 MG tablet Take 500 mg by mouth once daily.      diclofenac sodium (VOLTAREN) 1 % Gel Apply 2 g topically 4 (four) times daily. for 10 days 100 g 0    folic acid (FOLVITE) 400 MCG tablet Take 400 mcg by mouth once daily.      hydroCHLOROthiazide (HYDRODIURIL) 25 MG tablet Take 1 tablet (25 mg total) by mouth once daily. (Patient not taking: Reported on 11/9/2023) 30 tablet 11     No current facility-administered medications on file prior to visit.       Medications have been reviewed and reconciled with patient at this visit.  Barriers to medications reviewed with patient.    Adverse reactions to current medications reviewed with patient..    Over the counter medications reviewed and reconciled with patient.    Exam:  Wt Readings from Last 3 Encounters:   11/09/23 92.7 kg (204 lb 5.9 oz)   10/27/23 93.6 kg (206 lb 5.6 oz)   10/27/23 92.6 kg (204 lb 2.3 oz)     Temp Readings from Last 3 Encounters:   11/09/23 98.4 °F (36.9 °C) (Tympanic)   10/26/23 98.3 °F (36.8 °C)   10/19/23 98.4 °F (36.9 °C) (Oral)     BP Readings from Last 3 Encounters:   11/09/23 120/85   10/27/23 (!) 142/98   10/27/23 (!) 142/92     Pulse Readings from Last 3 Encounters:   11/09/23 97   10/26/23 65   10/19/23 96     Body mass index is 32.99 kg/m².      Review of Systems   Constitutional:  Negative for chills, fever and malaise/fatigue.   HENT:  Positive for congestion and sinus pain. Negative for ear pain and sore throat.    Respiratory:  Negative  for cough, sputum production, shortness of breath and wheezing.    Neurological:  Negative for headaches.     Physical Exam  Vitals and nursing note reviewed.   Constitutional:       Appearance: Normal appearance. She is obese.   HENT:      Head: Normocephalic and atraumatic.      Right Ear: Tympanic membrane, ear canal and external ear normal.      Left Ear: Tympanic membrane, ear canal and external ear normal.      Nose: Congestion and rhinorrhea present.      Mouth/Throat:      Mouth: Mucous membranes are moist.      Pharynx: Oropharynx is clear.   Eyes:      Extraocular Movements: Extraocular movements intact.      Conjunctiva/sclera: Conjunctivae normal.      Pupils: Pupils are equal, round, and reactive to light.   Cardiovascular:      Rate and Rhythm: Normal rate and regular rhythm.      Pulses: Normal pulses.      Heart sounds: Normal heart sounds.   Pulmonary:      Effort: Pulmonary effort is normal. No respiratory distress.      Breath sounds: No wheezing.   Abdominal:      General: Bowel sounds are normal.      Palpations: Abdomen is soft.   Musculoskeletal:         General: Normal range of motion.      Cervical back: Normal range of motion.   Skin:     General: Skin is warm.      Capillary Refill: Capillary refill takes less than 2 seconds.   Neurological:      General: No focal deficit present.      Mental Status: She is alert and oriented to person, place, and time.   Psychiatric:         Mood and Affect: Mood normal.         Behavior: Behavior normal.         Thought Content: Thought content normal.         Judgment: Judgment normal.         Laboratory Reviewed ({Yes)  Lab Results   Component Value Date    WBC 10.70 10/26/2023    HGB 14.6 10/26/2023    HCT 42.2 10/26/2023     10/26/2023    CHOL 106 (L) 03/12/2021    TRIG 30 03/12/2021    HDL 42 03/12/2021    ALT 17 10/26/2023    AST 9 (L) 10/26/2023     10/26/2023    K 4.0 10/26/2023     10/26/2023    CREATININE 0.9 10/26/2023     BUN 13 10/26/2023    CO2 20 (L) 10/26/2023    TSH 2.418 10/26/2023    GLUF 148 (H) 08/29/2018    HGBA1C 4.7 03/12/2021       Smith was seen today for follow-up.    Diagnoses and all orders for this visit:    Sinus pressure    Hypertension, unspecified type    Healthcare maintenance  -     HEMOGLOBIN A1C; Future    Anxiety        PLAN   A1C  OTC coricidin HBP for sinus congestion  3. Start a low-fat diet.    Exercise 45 minutes a day for 4-5 days a week.    4. The current medical regimen is effective;  continue present plan and medications.         Care Plan/Goals: Reviewed    Goals    None     Smith was seen today for follow-up.    Diagnoses and all orders for this visit:    Sinus pressure    Hypertension, unspecified type    Healthcare maintenance  -     HEMOGLOBIN A1C; Future    Anxiety         Follow up: Follow up in about 3 months (around 2/9/2024) for with scarlett ESPINOSA in 3 months.    After visit summary was printed and given to patient upon discharge today.  Patient goals and care plan are included in After Visit Summary.

## 2023-11-10 ENCOUNTER — PATIENT MESSAGE (OUTPATIENT)
Dept: FAMILY MEDICINE | Facility: CLINIC | Age: 33
End: 2023-11-10
Payer: COMMERCIAL

## 2023-11-10 DIAGNOSIS — E11.9 TYPE 2 DIABETES MELLITUS WITHOUT COMPLICATION, WITHOUT LONG-TERM CURRENT USE OF INSULIN: Primary | ICD-10-CM

## 2023-11-10 RX ORDER — SEMAGLUTIDE 1.34 MG/ML
0.25 INJECTION, SOLUTION SUBCUTANEOUS
Qty: 3 ML | Refills: 2 | Status: SHIPPED | OUTPATIENT
Start: 2023-11-10 | End: 2023-12-14

## 2023-11-10 RX ORDER — TIRZEPATIDE 2.5 MG/.5ML
2.5 INJECTION, SOLUTION SUBCUTANEOUS
Qty: 4 PEN | Refills: 5 | Status: SHIPPED | OUTPATIENT
Start: 2023-11-10 | End: 2023-12-14

## 2023-11-13 ENCOUNTER — PATIENT MESSAGE (OUTPATIENT)
Dept: FAMILY MEDICINE | Facility: CLINIC | Age: 33
End: 2023-11-13
Payer: COMMERCIAL

## 2023-11-13 DIAGNOSIS — E11.9 TYPE 2 DIABETES MELLITUS WITHOUT COMPLICATION, WITHOUT LONG-TERM CURRENT USE OF INSULIN: Primary | ICD-10-CM

## 2023-11-14 ENCOUNTER — PATIENT MESSAGE (OUTPATIENT)
Dept: FAMILY MEDICINE | Facility: CLINIC | Age: 33
End: 2023-11-14
Payer: COMMERCIAL

## 2023-11-14 ENCOUNTER — TELEPHONE (OUTPATIENT)
Dept: DIABETES | Facility: CLINIC | Age: 33
End: 2023-11-14
Payer: COMMERCIAL

## 2023-11-14 RX ORDER — INSULIN PUMP SYRINGE, 3 ML
EACH MISCELLANEOUS
Qty: 1 EACH | Refills: 0 | Status: SHIPPED | OUTPATIENT
Start: 2023-11-14 | End: 2024-11-13

## 2023-11-14 NOTE — TELEPHONE ENCOUNTER
Called patient to assist with scheduling a new patient appointment. Patient declined stating she wants to go the natural first with trying to lose weight

## 2023-11-17 ENCOUNTER — HOSPITAL ENCOUNTER (EMERGENCY)
Facility: HOSPITAL | Age: 33
Discharge: HOME | End: 2023-11-17
Attending: EMERGENCY MEDICINE
Payer: COMMERCIAL

## 2023-11-17 VITALS
RESPIRATION RATE: 20 BRPM | TEMPERATURE: 98 F | BODY MASS INDEX: 33.41 KG/M2 | HEART RATE: 102 BPM | DIASTOLIC BLOOD PRESSURE: 92 MMHG | OXYGEN SATURATION: 98 % | HEIGHT: 66 IN | SYSTOLIC BLOOD PRESSURE: 149 MMHG | WEIGHT: 207.88 LBS

## 2023-11-17 DIAGNOSIS — R42 DIZZINESS: ICD-10-CM

## 2023-11-17 DIAGNOSIS — I10 ESSENTIAL HYPERTENSION: Primary | ICD-10-CM

## 2023-11-17 DIAGNOSIS — J01.90 ACUTE SINUSITIS, RECURRENCE NOT SPECIFIED, UNSPECIFIED LOCATION: ICD-10-CM

## 2023-11-17 LAB
ALBUMIN SERPL BCP-MCNC: 4.3 G/DL (ref 3.5–5.2)
ALP SERPL-CCNC: 76 U/L (ref 55–135)
ALT SERPL W/O P-5'-P-CCNC: 21 U/L (ref 10–44)
ANION GAP SERPL CALC-SCNC: 12 MMOL/L (ref 8–16)
AST SERPL-CCNC: 16 U/L (ref 10–40)
B-HCG UR QL: NEGATIVE
BASOPHILS # BLD AUTO: 0.03 K/UL (ref 0–0.2)
BASOPHILS NFR BLD: 0.5 % (ref 0–1.9)
BILIRUB SERPL-MCNC: 0.7 MG/DL (ref 0.1–1)
BILIRUB UR QL STRIP: NEGATIVE
BUN SERPL-MCNC: 7 MG/DL (ref 6–20)
CALCIUM SERPL-MCNC: 9.8 MG/DL (ref 8.7–10.5)
CHLORIDE SERPL-SCNC: 103 MMOL/L (ref 95–110)
CLARITY UR: CLEAR
CO2 SERPL-SCNC: 24 MMOL/L (ref 23–29)
COLOR UR: YELLOW
CREAT SERPL-MCNC: 0.9 MG/DL (ref 0.5–1.4)
DIFFERENTIAL METHOD: NORMAL
EOSINOPHIL # BLD AUTO: 0.1 K/UL (ref 0–0.5)
EOSINOPHIL NFR BLD: 2 % (ref 0–8)
ERYTHROCYTE [DISTWIDTH] IN BLOOD BY AUTOMATED COUNT: 11.8 % (ref 11.5–14.5)
EST. GFR  (NO RACE VARIABLE): >60 ML/MIN/1.73 M^2
GLUCOSE SERPL-MCNC: 168 MG/DL (ref 70–110)
GLUCOSE UR QL STRIP: NEGATIVE
HCT VFR BLD AUTO: 40.1 % (ref 37–48.5)
HCV AB SERPL QL IA: NEGATIVE
HEP C VIRUS HOLD SPECIMEN: NORMAL
HGB BLD-MCNC: 13.6 G/DL (ref 12–16)
HGB UR QL STRIP: NEGATIVE
HIV 1+2 AB+HIV1 P24 AG SERPL QL IA: NEGATIVE
IMM GRANULOCYTES # BLD AUTO: 0.02 K/UL (ref 0–0.04)
IMM GRANULOCYTES NFR BLD AUTO: 0.4 % (ref 0–0.5)
KETONES UR QL STRIP: ABNORMAL
LEUKOCYTE ESTERASE UR QL STRIP: NEGATIVE
LYMPHOCYTES # BLD AUTO: 1.8 K/UL (ref 1–4.8)
LYMPHOCYTES NFR BLD: 32.8 % (ref 18–48)
MCH RBC QN AUTO: 29.6 PG (ref 27–31)
MCHC RBC AUTO-ENTMCNC: 33.9 G/DL (ref 32–36)
MCV RBC AUTO: 87 FL (ref 82–98)
MONOCYTES # BLD AUTO: 0.6 K/UL (ref 0.3–1)
MONOCYTES NFR BLD: 11.1 % (ref 4–15)
NEUTROPHILS # BLD AUTO: 2.9 K/UL (ref 1.8–7.7)
NEUTROPHILS NFR BLD: 53.2 % (ref 38–73)
NITRITE UR QL STRIP: NEGATIVE
NRBC BLD-RTO: 0 /100 WBC
PH UR STRIP: 6 [PH] (ref 5–8)
PLATELET # BLD AUTO: 294 K/UL (ref 150–450)
PMV BLD AUTO: 11.3 FL (ref 9.2–12.9)
POTASSIUM SERPL-SCNC: 3.5 MMOL/L (ref 3.5–5.1)
PROT SERPL-MCNC: 7.7 G/DL (ref 6–8.4)
PROT UR QL STRIP: NEGATIVE
RBC # BLD AUTO: 4.59 M/UL (ref 4–5.4)
SODIUM SERPL-SCNC: 139 MMOL/L (ref 136–145)
SP GR UR STRIP: 1.01 (ref 1–1.03)
TROPONIN I SERPL DL<=0.01 NG/ML-MCNC: <0.006 NG/ML (ref 0–0.03)
URN SPEC COLLECT METH UR: ABNORMAL
UROBILINOGEN UR STRIP-ACNC: NEGATIVE EU/DL
WBC # BLD AUTO: 5.52 K/UL (ref 3.9–12.7)

## 2023-11-17 PROCEDURE — 81025 URINE PREGNANCY TEST: CPT | Performed by: NURSE PRACTITIONER

## 2023-11-17 PROCEDURE — 85025 COMPLETE CBC W/AUTO DIFF WBC: CPT | Performed by: NURSE PRACTITIONER

## 2023-11-17 PROCEDURE — 87389 HIV-1 AG W/HIV-1&-2 AB AG IA: CPT | Performed by: EMERGENCY MEDICINE

## 2023-11-17 PROCEDURE — 93010 EKG 12-LEAD: ICD-10-PCS | Mod: ,,, | Performed by: INTERNAL MEDICINE

## 2023-11-17 PROCEDURE — 84484 ASSAY OF TROPONIN QUANT: CPT | Performed by: NURSE PRACTITIONER

## 2023-11-17 PROCEDURE — 93010 ELECTROCARDIOGRAM REPORT: CPT | Mod: ,,, | Performed by: INTERNAL MEDICINE

## 2023-11-17 PROCEDURE — 99285 EMERGENCY DEPT VISIT HI MDM: CPT | Mod: 25

## 2023-11-17 PROCEDURE — 93005 ELECTROCARDIOGRAM TRACING: CPT

## 2023-11-17 PROCEDURE — 81003 URINALYSIS AUTO W/O SCOPE: CPT | Performed by: NURSE PRACTITIONER

## 2023-11-17 PROCEDURE — 80053 COMPREHEN METABOLIC PANEL: CPT | Performed by: NURSE PRACTITIONER

## 2023-11-17 PROCEDURE — 86803 HEPATITIS C AB TEST: CPT | Performed by: EMERGENCY MEDICINE

## 2023-11-17 RX ORDER — AMOXICILLIN AND CLAVULANATE POTASSIUM 875; 125 MG/1; MG/1
1 TABLET, FILM COATED ORAL 2 TIMES DAILY
Qty: 14 TABLET | Refills: 0 | Status: SHIPPED | OUTPATIENT
Start: 2023-11-17 | End: 2023-11-24

## 2023-11-18 NOTE — ED NOTES
Smith is a 32 y.o. year old female that is being treated in [facility] until an ER bed becomes available. During this course of treatment Smith was not assigned a specific nurse. The [facility] uses a treatment team approach including 2 or more of the following disciplines: MD, NP, LPN RN, Medical Tech and EMT.     A focused assessment based on chief complaint revealed the following data.     Patient identifiers verified and correct for Smith Love.    LOC: The patient is awake, alert and aware of environment with an appropriate affect, the patient is oriented x 3 and speaking appropriately. Moderate-high anxiety noted. Presenting s/s include: trembling hands, rapid pressured speech and vocally, SOB reporting ongoing anxiety and nervousness.   APPEARANCE: Patient resting comfortably and in no acute distress, patient is clean and well groomed, patient's clothing is properly fastened.  SKIN: The patient was not undressed for a full head to toe skin assessment. The skin is warm and dry, color consistent with ethnicity, patient has normal skin turgor and moist mucus membranes, skin appears intact with no breakdown or bruising noted.  MUSCULOSKELETAL: Patient moving all extremities spontaneously.  RESPIRATORY: Airway is open and patent, respirations are spontaneous.  CARDIAC: Patient denies chest pain, SOB.   ABDOMEN: Soft and non tender to palpation.

## 2023-11-18 NOTE — ED PROVIDER NOTES
"SCRIBE #1 NOTE: I, Fang Dela Cruz, am scribing for, and in the presence of, Heri Coronel MD. I have scribed the entire note.       History     Chief Complaint   Patient presents with    Dizziness     Multiple episodes of dizziness in the past week; denies cardiac hx     Review of patient's allergies indicates:  No Known Allergies      History of Present Illness     HPI    11/17/2023, 10:31 PM  History obtained from the patient      History of Present Illness: Smith Love is a 32 y.o. female patient with a PMHx of asthma and essential HTN who presents to the Emergency Department for evaluation of dizziness. Pt states she was standing and talking to someone, when she felt dizzy all of the sudden and felt "unsteady." Pt states this was a singular episode and has since resolved. She has been anxious lately. Pt also complains of sinus pressure/fullness which onset 1.5 weeks ago. Symptoms are constant and moderate in severity. No mitigating or exacerbating factors reported. Associated sxs include occasional ear pain, pain behind eyes, sinus pressure, and sinus fullness. Patient denies any fever, SOB, nausea, HA, and all other sxs at this time. Prior Tx includes Flonase and saline. No further complaints or concerns at this time.       Arrival mode: Personal vehicle    PCP: Caro Hernandez MD        Past Medical History:  Past Medical History:   Diagnosis Date    Asthma     COVID-19 08/2020    Essential hypertension 04/27/2017    LGSIL on Pap smear of cervix 11/6/2023    Right knee injury 07/27/2020    Staph aureus infection        Past Surgical History:  No past surgical history on file.      Family History:  Family History   Problem Relation Age of Onset    No Known Problems Mother     Hypertension Father     Asthma Father     Heart failure Paternal Uncle     Hypertension Maternal Grandmother     Miscarriages / Stillbirths Maternal Grandmother        Social History:  Social History     Tobacco Use    " Smoking status: Never     Passive exposure: Never    Smokeless tobacco: Never   Substance and Sexual Activity    Alcohol use: Yes     Alcohol/week: 1.0 standard drink of alcohol     Types: 1 Glasses of wine per week     Comment: socially    Drug use: No    Sexual activity: Yes     Partners: Male     Birth control/protection: None        Review of Systems     Review of Systems   Constitutional:  Negative for fever.   HENT:  Positive for ear pain (occasional) and sinus pressure. Negative for sore throat.         (+) sinus fullness   Eyes:         (+) pain behind eyes   Respiratory:  Negative for shortness of breath.    Cardiovascular:  Negative for chest pain.   Gastrointestinal:  Negative for nausea.   Genitourinary:  Negative for dysuria.   Musculoskeletal:  Negative for back pain.   Skin:  Negative for rash.   Neurological:  Positive for dizziness. Negative for weakness and headaches.   Hematological:  Does not bruise/bleed easily.   Psychiatric/Behavioral:          (+) anxiety   All other systems reviewed and are negative.       Physical Exam     Initial Vitals [11/17/23 1804]   BP Pulse Resp Temp SpO2   (!) 150/102 102 20 98.2 °F (36.8 °C) 98 %      MAP       --          Physical Exam  Nursing Notes and Vital Signs Reviewed.  Constitutional: Patient is in no acute distress. Well-developed and well-nourished.  Head: Atraumatic. Normocephalic.  Eyes: PERRL. EOM intact. Conjunctivae are not pale. No scleral icterus.  ENT: Mucous membranes are moist.  Tympanic membranes normal bilaterally  Neck: Supple. Full ROM.   Cardiovascular: Regular rate. Regular rhythm. No murmurs, rubs, or gallops. Distal pulses are 2+ and symmetric.  Pulmonary/Chest: No respiratory distress. Clear to auscultation bilaterally. No wheezing or rales.  Abdominal: Soft and non-distended.  There is no tenderness.  No rebound, guarding, or rigidity.   Genitourinary: No CVA tenderness  Musculoskeletal: Moves all extremities. No obvious deformities.  "No edema.  Skin: Warm and dry.  Neurological:  Alert, awake, and appropriate.  Normal speech.  No acute focal neurological deficits are appreciated.  Psychiatric: Normal affect. Good eye contact. Appropriate in content.     ED Course   Procedures  ED Vital Signs:  Vitals:    11/17/23 1804 11/17/23 2004 11/17/23 2007   BP: (!) 150/102 (!) 165/85 (!) 149/92   Pulse: 102     Resp: 20     Temp: 98.2 °F (36.8 °C)     TempSrc: Oral     SpO2: 98%     Weight: 94.3 kg (207 lb 14.3 oz)     Height: 5' 6" (1.676 m)         Abnormal Lab Results:  Labs Reviewed   COMPREHENSIVE METABOLIC PANEL - Abnormal; Notable for the following components:       Result Value    Glucose 168 (*)     All other components within normal limits    Narrative:     Release to patient->Immediate   URINALYSIS, REFLEX TO URINE CULTURE - Abnormal; Notable for the following components:    Ketones, UA 1+ (*)     All other components within normal limits    Narrative:     Specimen Source->Urine   HIV 1 / 2 ANTIBODY    Narrative:     Release to patient->Immediate   HEPATITIS C ANTIBODY    Narrative:     Release to patient->Immediate   HEP C VIRUS HOLD SPECIMEN    Narrative:     Release to patient->Immediate   CBC W/ AUTO DIFFERENTIAL    Narrative:     Release to patient->Immediate   TROPONIN I    Narrative:     Release to patient->Immediate   PREGNANCY TEST, URINE RAPID    Narrative:     Specimen Source->Urine        All Lab Results:  Results for orders placed or performed during the hospital encounter of 11/17/23   HIV 1/2 Ag/Ab (4th Gen)   Result Value Ref Range    HIV 1/2 Ag/Ab Negative Negative   Hepatitis C Antibody   Result Value Ref Range    Hepatitis C Ab Negative Negative   HCV Virus Hold Specimen   Result Value Ref Range    HEP C Virus Hold Specimen Hold for HCV sendout    CBC auto differential   Result Value Ref Range    WBC 5.52 3.90 - 12.70 K/uL    RBC 4.59 4.00 - 5.40 M/uL    Hemoglobin 13.6 12.0 - 16.0 g/dL    Hematocrit 40.1 37.0 - 48.5 %    MCV " 87 82 - 98 fL    MCH 29.6 27.0 - 31.0 pg    MCHC 33.9 32.0 - 36.0 g/dL    RDW 11.8 11.5 - 14.5 %    Platelets 294 150 - 450 K/uL    MPV 11.3 9.2 - 12.9 fL    Immature Granulocytes 0.4 0.0 - 0.5 %    Gran # (ANC) 2.9 1.8 - 7.7 K/uL    Immature Grans (Abs) 0.02 0.00 - 0.04 K/uL    Lymph # 1.8 1.0 - 4.8 K/uL    Mono # 0.6 0.3 - 1.0 K/uL    Eos # 0.1 0.0 - 0.5 K/uL    Baso # 0.03 0.00 - 0.20 K/uL    nRBC 0 0 /100 WBC    Gran % 53.2 38.0 - 73.0 %    Lymph % 32.8 18.0 - 48.0 %    Mono % 11.1 4.0 - 15.0 %    Eosinophil % 2.0 0.0 - 8.0 %    Basophil % 0.5 0.0 - 1.9 %    Differential Method Automated    Comprehensive metabolic panel   Result Value Ref Range    Sodium 139 136 - 145 mmol/L    Potassium 3.5 3.5 - 5.1 mmol/L    Chloride 103 95 - 110 mmol/L    CO2 24 23 - 29 mmol/L    Glucose 168 (H) 70 - 110 mg/dL    BUN 7 6 - 20 mg/dL    Creatinine 0.9 0.5 - 1.4 mg/dL    Calcium 9.8 8.7 - 10.5 mg/dL    Total Protein 7.7 6.0 - 8.4 g/dL    Albumin 4.3 3.5 - 5.2 g/dL    Total Bilirubin 0.7 0.1 - 1.0 mg/dL    Alkaline Phosphatase 76 55 - 135 U/L    AST 16 10 - 40 U/L    ALT 21 10 - 44 U/L    eGFR >60 >60 mL/min/1.73 m^2    Anion Gap 12 8 - 16 mmol/L   Troponin I   Result Value Ref Range    Troponin I <0.006 0.000 - 0.026 ng/mL   Urinalysis, Reflex to Urine Culture Urine, Clean Catch    Specimen: Urine   Result Value Ref Range    Specimen UA Urine, Clean Catch     Color, UA Yellow Yellow, Straw, Shamika    Appearance, UA Clear Clear    pH, UA 6.0 5.0 - 8.0    Specific Gravity, UA 1.010 1.005 - 1.030    Protein, UA Negative Negative    Glucose, UA Negative Negative    Ketones, UA 1+ (A) Negative    Bilirubin (UA) Negative Negative    Occult Blood UA Negative Negative    Nitrite, UA Negative Negative    Urobilinogen, UA Negative <2.0 EU/dL    Leukocytes, UA Negative Negative   Pregnancy, urine rapid (UPT)   Result Value Ref Range    Preg Test, Ur Negative         Imaging Results:  Imaging Results              X-Ray Chest AP Portable  (Final result)  Result time 11/17/23 18:36:30      Final result by Young Angela MD (11/17/23 18:36:30)                   Impression:      No acute abnormality.      Electronically signed by: Young Angela  Date:    11/17/2023  Time:    18:36               Narrative:    EXAMINATION:  XR CHEST AP PORTABLE    CLINICAL HISTORY:  Dizziness and giddiness    TECHNIQUE:  Single frontal view of the chest was performed.    COMPARISON:  None    FINDINGS:  The lungs are clear, with normal appearance of pulmonary vasculature and no pleural effusion or pneumothorax.    The cardiac silhouette is normal in size. The hilar and mediastinal contours are unremarkable.    Bones are intact.                                       The EKG was ordered, reviewed, and independently interpreted by the ED provider.  Interpretation time: 18:03  Rate: 109 BPM  Rhythm: sinus tachycardia  Interpretation: Low voltage QRS. Nonspecific T wave abnormality. No STEMI.             The Emergency Provider reviewed the vital signs and test results, which are outlined above.     ED Discussion   10:55 PM: Reassessed pt at this time. Discussed with pt all pertinent ED information and results. Discussed pt dx and plan of tx. Gave pt all f/u and return to the ED instructions. All questions and concerns were addressed at this time. Pt expresses understanding of information and instructions, and is comfortable with plan to discharge. Pt is stable for discharge.    I discussed with patient and/or family/caretaker that evaluation in the ED does not suggest any emergent or life threatening medical conditions requiring immediate intervention beyond what was provided in the ED, and I believe patient is safe for discharge.  Regardless, an unremarkable evaluation in the ED does not preclude the development or presence of a serious of life threatening condition. As such, patient was instructed to return immediately for any worsening or change in current symptoms.           Medical Decision Making  Acute sinusitis not getting better with conservative management.  Going on 2 weeks.  Associated with a dizzy spell today.  Prescription for Augmentin provided.  Remainder of ED workup within normal limits.  Patient was noted to be hypertensive.  She states that these readings are higher than her normal readings.  Advised her to keep an eye on her blood pressure and continue her HTN medications for now as prescribed    Amount and/or Complexity of Data Reviewed  External Data Reviewed: labs, radiology and ECG.  Labs: ordered. Decision-making details documented in ED Course.  Radiology: ordered. Decision-making details documented in ED Course.  ECG/medicine tests: ordered. Decision-making details documented in ED Course.    Risk  OTC drugs.  Prescription drug management.                ED Medication(s):  Medications - No data to display    New Prescriptions    AMOXICILLIN-CLAVULANATE 875-125MG (AUGMENTIN) 875-125 MG PER TABLET    Take 1 tablet by mouth 2 (two) times daily. for 7 days        Follow-up Information       Call  Caro Hernandez MD.    Specialty: Family Medicine  Contact information:  [address] [facility] [address] [phone]               [facility] - Emergency Dept..    Specialty: Emergency Medicine  Why: As needed, If symptoms worsen  Contact information:  [address] [facility] [address] [phone]                               Scribe Attestation:   Scribe #1: I performed the above scribed service and the documentation accurately describes the services I performed. I attest to the accuracy of the note.     Attending:   Physician Attestation Statement for Scribe #1: I, Heri Coronel MD, personally performed the services described in this documentation, as scribed by Fang Dela Cruz, in my presence, and it is both accurate and complete.       Scribe Attestation:   Scribe #1: I performed the above scribed service and the  documentation accurately describes the services I performed. I attest to the accuracy of the note.         Clinical Impression       ICD-10-CM ICD-9-CM   1. Essential hypertension  I10 401.9   2. Dizziness  R42 780.4   3. Acute sinusitis, recurrence not specified, unspecified location  J01.90 461.9       Disposition:   Disposition: Discharged  Condition: Stable        Heri Coronel MD  11/18/23 0128

## 2023-11-18 NOTE — FIRST PROVIDER EVALUATION
"Medical screening examination initiated.  I have conducted a focused provider triage encounter, findings are as follows:    Brief history of present illness:  reports recent dizzy spells. Reports took bp meds today    Vitals:    11/17/23 1804   Pulse: 102   Resp: 20   TempSrc: Oral   SpO2: 98%   Weight: 94.3 kg (207 lb 14.3 oz)   Height: 5' 6" (1.676 m)       Pertinent physical exam:  nad    Brief workup plan:  labs, EKG, imaging, further eval    Preliminary workup initiated; this workup will be continued and followed by the physician or advanced practice provider that is assigned to the patient when roomed.  "

## 2023-11-21 ENCOUNTER — TELEPHONE (OUTPATIENT)
Dept: OBSTETRICS AND GYNECOLOGY | Facility: CLINIC | Age: 33
End: 2023-11-21
Payer: COMMERCIAL

## 2023-11-30 ENCOUNTER — TELEPHONE (OUTPATIENT)
Dept: CARDIOLOGY | Facility: HOSPITAL | Age: 33
End: 2023-11-30
Payer: COMMERCIAL

## 2023-11-30 ENCOUNTER — TELEPHONE (OUTPATIENT)
Dept: CARDIOLOGY | Facility: CLINIC | Age: 33
End: 2023-11-30
Payer: COMMERCIAL

## 2023-11-30 NOTE — TELEPHONE ENCOUNTER
Spoke with pt in regards to rescheduling echo appt.           ----- Message from Bianka Flynn sent at 11/30/2023  9:35 AM CST -----  Contact: Smith Mtz is needing a call back in regards to having her Echo orders updated. Please give her a call back at [phone]

## 2023-12-01 ENCOUNTER — OFFICE VISIT (OUTPATIENT)
Dept: URGENT CARE | Facility: CLINIC | Age: 33
End: 2023-12-01
Payer: COMMERCIAL

## 2023-12-01 VITALS
TEMPERATURE: 100 F | SYSTOLIC BLOOD PRESSURE: 118 MMHG | DIASTOLIC BLOOD PRESSURE: 90 MMHG | BODY MASS INDEX: 31.66 KG/M2 | OXYGEN SATURATION: 97 % | WEIGHT: 197 LBS | HEART RATE: 82 BPM | RESPIRATION RATE: 20 BRPM | HEIGHT: 66 IN

## 2023-12-01 DIAGNOSIS — R73.9 BLOOD GLUCOSE ELEVATED: ICD-10-CM

## 2023-12-01 DIAGNOSIS — R53.83 FATIGUE, UNSPECIFIED TYPE: Primary | ICD-10-CM

## 2023-12-01 DIAGNOSIS — R42 LIGHT HEADEDNESS: ICD-10-CM

## 2023-12-01 LAB
B-HCG UR QL: NEGATIVE
CTP QC/QA: YES
GLUCOSE SERPL-MCNC: 140 MG/DL (ref 70–110)

## 2023-12-01 PROCEDURE — 81025 URINE PREGNANCY TEST: CPT | Mod: S$GLB,,,

## 2023-12-01 PROCEDURE — 81025 POCT URINE PREGNANCY: ICD-10-PCS | Mod: S$GLB,,,

## 2023-12-01 PROCEDURE — 82962 POCT GLUCOSE, HAND-HELD DEVICE: ICD-10-PCS | Mod: S$GLB,,,

## 2023-12-01 PROCEDURE — 82962 GLUCOSE BLOOD TEST: CPT | Mod: S$GLB,,,

## 2023-12-01 PROCEDURE — 99213 OFFICE O/P EST LOW 20 MIN: CPT | Mod: S$GLB,,,

## 2023-12-01 PROCEDURE — 99213 PR OFFICE/OUTPT VISIT, EST, LEVL III, 20-29 MIN: ICD-10-PCS | Mod: S$GLB,,,

## 2023-12-01 NOTE — PROGRESS NOTES
"Subjective:      Patient ID: Smith Love is a 33 y.o. female.    Vitals:  height is 5' 6" (1.676 m) and weight is 89.4 kg (197 lb). Her oral temperature is 99.6 °F (37.6 °C). Her blood pressure is 118/90 (abnormal) and her pulse is 82. Her respiration is 20 and oxygen saturation is 97%.     Chief Complaint: Fatigue    34 yo female presents for episodes of feeling light headed, and generalized fatigue. Patient Was in ER 11/17 for similar symptoms.  Dx sinus infection.  Completed abx Monday.  Diarrhea while taking abx; stopped when abx complete.  Wednesday feeling facial pressure again and extreme fatigue while looking at wedding venues with fiance. States she had a brief episode of dizziness (explained that she felt like she would pass out). Patient states she has not eaten since noon yesterday. States she does have anxiety but has not gotten a refill/been taking Buspar as instructed. Patient concerned because she recently had blood work done and found out her A1C was around 8 and patient has been actively trying to lose weight. "I've lost 10 lbs already, I don't want to be on diabetic medicine. They are going to recheck my levels in January because she said maybe it was due to me being on steroids for my asthma." Denies cp, sob, trouble swallowing, heart palpitations, dizziness, n/v, LOC, body aches, ear drainage. Patient states her period was supposed to start 3-4 days ago and she did have unprotected sex. "The previous month we were actively trying for a baby, but when we got the negative result, we did not attempt to try this month." NKDA.    Fatigue  Associated symptoms include fatigue. Pertinent negatives include no abdominal pain, chest pain, chills, diaphoresis, fever, headaches, nausea, neck pain, numbness, rash, sore throat, vertigo or vomiting. Associated symptoms comments: Little wheezing, hx asthma. Treatments tried: flonase. The treatment provided moderate relief.       Constitution: Positive " for fatigue. Negative for chills, sweating and fever.   HENT:  Positive for sinus pressure. Negative for ear pain and sore throat.    Neck: Negative for neck pain and neck stiffness.   Cardiovascular:  Negative for chest pain, palpitations, sob on exertion and passing out.   Eyes:  Negative for eye itching, eye redness, double vision and blurred vision.   Gastrointestinal:  Negative for abdominal pain, nausea, vomiting and diarrhea.   Genitourinary:  Negative for dysuria, frequency, urgency and flank pain. Missed menses: late.  Skin:  Negative for rash.   Allergic/Immunologic: Negative for sneezing.   Neurological:  Positive for light-headedness. Negative for dizziness, history of vertigo, speech difficulty, headaches, loss of consciousness, numbness and tingling.      Objective:     Vitals:    12/01/23 1011   BP: (!) 118/90   Pulse: 82   Resp: 20   Temp: 99.6 °F (37.6 °C)       Physical Exam   Constitutional: She is oriented to person, place, and time. She appears well-developed. She is cooperative.  Non-toxic appearance. She does not appear ill. No distress. awake  HENT:   Head: Normocephalic and atraumatic.   Ears:   Right Ear: Hearing, tympanic membrane, external ear and ear canal normal. No no drainage, swelling, tenderness or cerumen not present. Tympanic membrane is not erythematous and not bulging. No middle ear effusion. impacted cerumen  Left Ear: Hearing, tympanic membrane, external ear and ear canal normal. No no drainage, swelling, tenderness or cerumen not present. Tympanic membrane is not erythematous and not bulging.  No middle ear effusion. impacted cerumen  Nose: Nose normal. No mucosal edema, rhinorrhea or nasal deformity. No epistaxis. Right sinus exhibits no maxillary sinus tenderness and no frontal sinus tenderness. Left sinus exhibits no maxillary sinus tenderness and no frontal sinus tenderness.   Mouth/Throat: Uvula is midline, oropharynx is clear and moist and mucous membranes are normal.  Mucous membranes are moist. No trismus in the jaw. Normal dentition. No uvula swelling. No oropharyngeal exudate, posterior oropharyngeal edema or posterior oropharyngeal erythema. No tonsillar exudate.   Eyes: Conjunctivae and lids are normal. Pupils are equal, round, and reactive to light. Right eye exhibits no discharge. Left eye exhibits no discharge. No scleral icterus. Extraocular movement intact gaze aligned appropriately periorbital hyperpigmentation  Neck: Trachea normal and phonation normal. Neck supple. No edema present. No erythema present. No neck rigidity present.   Cardiovascular: Normal rate, regular rhythm, normal heart sounds and normal pulses. Exam reveals no distant heart sounds.   Pulmonary/Chest: Effort normal and breath sounds normal. No accessory muscle usage or stridor. No tachypnea and no bradypnea. No respiratory distress. She has no decreased breath sounds. She has no wheezes. She has no rhonchi. She has no rales.   Abdominal: Normal appearance.   Musculoskeletal: Normal range of motion.         General: No deformity. Normal range of motion.   Neurological: She is alert and oriented to person, place, and time. She displays no weakness. She exhibits normal muscle tone. Coordination and gait normal.   Skin: Skin is warm, dry, intact, not diaphoretic, not pale and no rash.   Psychiatric: She experiences Normal attention. Her speech is normal and behavior is normal. Judgment normal. Her mood appears anxious.   Nursing note and vitals reviewed.      Assessment:     1. Fatigue, unspecified type    2. Light headedness    3. Blood glucose elevated      Results for orders placed or performed in visit on 12/01/23   POCT urine pregnancy   Result Value Ref Range    POC Preg Test, Ur Negative Negative     Acceptable Yes    POCT Glucose, Hand-Held Device   Result Value Ref Range    POC Glucose 140 (A) 70 - 110 MG/DL       Plan:       Fatigue, unspecified type  -     POCT urine  pregnancy  -     POCT Glucose, Hand-Held Device    Light headedness  -     POCT Glucose, Hand-Held Device    Blood glucose elevated        Patient Instructions     PLEASE READ YOUR DISCHARGE INSTRUCTIONS ENTIRELY AS IT CONTAINS IMPORTANT INFORMATION.    - Get rest and drink plenty of fluids    - Slowly change from laying, sitting, and standing positions to help lightheadedness/dizziness.    - Sit or lie down immediately when dizziness/lightheaded to avoid further injury.     - Watch for increase pain, fever, vomiting, neck pain or mental confusion.    Please go to the emergency room if you experience chest pain, shortness of breath, funny heart beats, headache, blurred vision, weakness in one arm or leg, slurred speech, numbness, inability to walk or talk, confusion.     Try to avoid common triggers of headaches (stress, menstruation, visual stimuli, weather changes, nitrates, fasting, wine, lack of sleep, smoking, odors, chocolate)    Follow up with PCP as scheduled for further evaluation.    Please return or see your primary care doctor if you develop new or worsening symptoms.       Please arrange follow up with your primary medical clinic as soon as possible. You must understand that you've received an Urgent Care treatment only and that you may be released before all of your medical problems are known or treated. You, the patient, will arrange for follow up as instructed. If your symptoms worsen or fail to improve you should go to the Emergency Room.    WE CANNOT RULE OUT ALL POSSIBLE CAUSES OF YOUR SYMPTOMS IN THE URGENT CARE SETTING PLEASE GO TO THE ER IF YOU FEELS YOUR CONDITION IS WORSENING OR YOU WOULD LIKE EMERGENT EVALUATION.            Medical Decision Making:   History:   Old Medical Records: I decided to obtain old medical records.  Clinical Tests:   Lab Tests: Ordered and Reviewed  The following lab test(s) were unremarkable: UPT       <> Summary of Lab: POCT Glu elevated  Urgent Care  Management:  Discussed results with patient. Encouraged to visit PCP for follow up. Explained differentials of fatigue include, anemia, thyroid dysfunction, anxiety, URI, etc and cannot be fully diagnosed at the clinic. Went into details about blood sugar levels, risk/benefits of 1st line treatment of diabetes, encouraged to continue lifestyle changes and praised patient for taking the step. Explained what Mounjarno injectable medication was for. Recommended that patient follow up for possible need of antianxiety medication (at least for short term use). ER precautions given in detail. Patient agreeable to plan and has no further questions or concerns. She states she believes it is down to anxiety.

## 2023-12-01 NOTE — PATIENT INSTRUCTIONS
PLEASE READ YOUR DISCHARGE INSTRUCTIONS ENTIRELY AS IT CONTAINS IMPORTANT INFORMATION.    - Get rest and drink plenty of fluids    - Slowly change from laying, sitting, and standing positions to help lightheadedness/dizziness.    - Sit or lie down immediately when dizziness/lightheaded to avoid further injury.     - Watch for increase pain, fever, vomiting, neck pain or mental confusion.    Please go to the emergency room if you experience chest pain, shortness of breath, funny heart beats, headache, blurred vision, weakness in one arm or leg, slurred speech, numbness, inability to walk or talk, confusion.     Try to avoid common triggers of headaches (stress, menstruation, visual stimuli, weather changes, nitrates, fasting, wine, lack of sleep, smoking, odors, chocolate)    Follow up with PCP as scheduled for further evaluation.    Please return or see your primary care doctor if you develop new or worsening symptoms.       Please arrange follow up with your primary medical clinic as soon as possible. You must understand that you've received an Urgent Care treatment only and that you may be released before all of your medical problems are known or treated. You, the patient, will arrange for follow up as instructed. If your symptoms worsen or fail to improve you should go to the Emergency Room.    WE CANNOT RULE OUT ALL POSSIBLE CAUSES OF YOUR SYMPTOMS IN THE URGENT CARE SETTING PLEASE GO TO THE ER IF YOU FEELS YOUR CONDITION IS WORSENING OR YOU WOULD LIKE EMERGENT EVALUATION.

## 2023-12-06 ENCOUNTER — HOSPITAL ENCOUNTER (OUTPATIENT)
Dept: CARDIOLOGY | Facility: HOSPITAL | Age: 33
Discharge: HOME | End: 2023-12-06
Attending: INTERNAL MEDICINE
Payer: COMMERCIAL

## 2023-12-06 VITALS
SYSTOLIC BLOOD PRESSURE: 118 MMHG | DIASTOLIC BLOOD PRESSURE: 90 MMHG | HEIGHT: 66 IN | BODY MASS INDEX: 31.66 KG/M2 | WEIGHT: 197 LBS

## 2023-12-06 DIAGNOSIS — I10 PRIMARY HYPERTENSION: ICD-10-CM

## 2023-12-06 DIAGNOSIS — R94.31 EKG ABNORMALITIES: ICD-10-CM

## 2023-12-06 LAB
AORTIC ROOT ANNULUS: 2.67 CM
ASCENDING AORTA: 2.58 CM
AV INDEX (PROSTH): 0.8
AV MEAN GRADIENT: 3 MMHG
AV PEAK GRADIENT: 5 MMHG
AV VALVE AREA BY VELOCITY RATIO: 2.34 CM²
AV VALVE AREA: 2.33 CM²
AV VELOCITY RATIO: 0.8
BSA FOR ECHO PROCEDURE: 2.04 M2
CV ECHO LV RWT: 0.36 CM
DOP CALC AO PEAK VEL: 1.11 M/S
DOP CALC AO VTI: 24.3 CM
DOP CALC LVOT AREA: 2.9 CM2
DOP CALC LVOT DIAMETER: 1.93 CM
DOP CALC LVOT PEAK VEL: 0.89 M/S
DOP CALC LVOT STROKE VOLUME: 56.73 CM3
DOP CALC RVOT PEAK VEL: 0.55 M/S
DOP CALC RVOT VTI: 12 CM
DOP CALCLVOT PEAK VEL VTI: 19.4 CM
E WAVE DECELERATION TIME: 229.17 MSEC
E/A RATIO: 1.11
E/E' RATIO: 8.74 M/S
ECHO LV POSTERIOR WALL: 0.84 CM (ref 0.6–1.1)
FRACTIONAL SHORTENING: 33 % (ref 28–44)
INTERVENTRICULAR SEPTUM: 0.82 CM (ref 0.6–1.1)
IVC DIAMETER: 1.19 CM
IVRT: 119.89 MSEC
LA MAJOR: 4.16 CM
LA MINOR: 4.15 CM
LA WIDTH: 3.6 CM
LEFT ATRIUM SIZE: 3.11 CM
LEFT ATRIUM VOLUME INDEX MOD: 19.1 ML/M2
LEFT ATRIUM VOLUME INDEX: 19.9 ML/M2
LEFT ATRIUM VOLUME MOD: 38.01 CM3
LEFT ATRIUM VOLUME: 39.54 CM3
LEFT INTERNAL DIMENSION IN SYSTOLE: 3.11 CM (ref 2.1–4)
LEFT VENTRICLE DIASTOLIC VOLUME INDEX: 49.9 ML/M2
LEFT VENTRICLE DIASTOLIC VOLUME: 99.31 ML
LEFT VENTRICLE MASS INDEX: 63 G/M2
LEFT VENTRICLE SYSTOLIC VOLUME INDEX: 19.2 ML/M2
LEFT VENTRICLE SYSTOLIC VOLUME: 38.14 ML
LEFT VENTRICULAR INTERNAL DIMENSION IN DIASTOLE: 4.64 CM (ref 3.5–6)
LEFT VENTRICULAR MASS: 125.52 G
LV LATERAL E/E' RATIO: 6.92 M/S
LV SEPTAL E/E' RATIO: 11.86 M/S
LVOT MG: 1.77 MMHG
LVOT MV: 0.62 CM/S
MV PEAK A VEL: 0.75 M/S
MV PEAK E VEL: 0.83 M/S
MV STENOSIS PRESSURE HALF TIME: 66.46 MS
MV VALVE AREA P 1/2 METHOD: 3.31 CM2
PISA TR MAX VEL: 2.08 M/S
PULM VEIN S/D RATIO: 0.88
PV MEAN GRADIENT: 1 MMHG
PV PEAK D VEL: 0.5 M/S
PV PEAK GRADIENT: 2 MMHG
PV PEAK S VEL: 0.44 M/S
PV PEAK VELOCITY: 0.76 M/S
RA MAJOR: 3.99 CM
RA PRESSURE ESTIMATED: 3 MMHG
RA WIDTH: 2.83 CM
RIGHT VENTRICULAR END-DIASTOLIC DIMENSION: 2.77 CM
RV TB RVSP: 5 MMHG
SINUS: 2.75 CM
STJ: 2.53 CM
TDI LATERAL: 0.12 M/S
TDI SEPTAL: 0.07 M/S
TDI: 0.1 M/S
TR MAX PG: 17 MMHG
TRICUSPID ANNULAR PLANE SYSTOLIC EXCURSION: 1.99 CM
TV REST PULMONARY ARTERY PRESSURE: 20 MMHG
Z-SCORE OF LEFT VENTRICULAR DIMENSION IN END DIASTOLE: -2.18
Z-SCORE OF LEFT VENTRICULAR DIMENSION IN END SYSTOLE: -1.03

## 2023-12-06 PROCEDURE — 93306 TTE W/DOPPLER COMPLETE: CPT | Mod: 26,,, | Performed by: INTERNAL MEDICINE

## 2023-12-06 PROCEDURE — 93306 TTE W/DOPPLER COMPLETE: CPT

## 2023-12-06 PROCEDURE — 93306 ECHO (CUPID ONLY): ICD-10-PCS | Mod: 26,,, | Performed by: INTERNAL MEDICINE

## 2023-12-13 ENCOUNTER — OFFICE VISIT (OUTPATIENT)
Dept: URGENT CARE | Facility: CLINIC | Age: 33
End: 2023-12-13
Payer: COMMERCIAL

## 2023-12-13 VITALS
TEMPERATURE: 99 F | BODY MASS INDEX: 33.17 KG/M2 | OXYGEN SATURATION: 100 % | SYSTOLIC BLOOD PRESSURE: 146 MMHG | HEIGHT: 66 IN | HEART RATE: 75 BPM | WEIGHT: 206.38 LBS | DIASTOLIC BLOOD PRESSURE: 106 MMHG | RESPIRATION RATE: 18 BRPM

## 2023-12-13 DIAGNOSIS — R51.9 ACUTE NONINTRACTABLE HEADACHE, UNSPECIFIED HEADACHE TYPE: Primary | ICD-10-CM

## 2023-12-13 DIAGNOSIS — I10 HYPERTENSION, UNSPECIFIED TYPE: ICD-10-CM

## 2023-12-13 LAB
CTP QC/QA: YES
SARS-COV-2 AG RESP QL IA.RAPID: NEGATIVE

## 2023-12-13 PROCEDURE — 99213 OFFICE O/P EST LOW 20 MIN: CPT | Mod: S$GLB,,, | Performed by: PHYSICIAN ASSISTANT

## 2023-12-13 PROCEDURE — 87811 SARS CORONAVIRUS 2 ANTIGEN POCT, MANUAL READ: ICD-10-PCS | Mod: QW,S$GLB,, | Performed by: PHYSICIAN ASSISTANT

## 2023-12-13 PROCEDURE — 87811 SARS-COV-2 COVID19 W/OPTIC: CPT | Mod: QW,S$GLB,, | Performed by: PHYSICIAN ASSISTANT

## 2023-12-13 PROCEDURE — 99213 PR OFFICE/OUTPT VISIT, EST, LEVL III, 20-29 MIN: ICD-10-PCS | Mod: S$GLB,,, | Performed by: PHYSICIAN ASSISTANT

## 2023-12-13 RX ORDER — CLONIDINE HYDROCHLORIDE 0.1 MG/1
0.1 TABLET ORAL
Status: DISCONTINUED | OUTPATIENT
Start: 2023-12-13 | End: 2023-12-13

## 2023-12-13 RX ORDER — CLONIDINE HYDROCHLORIDE 0.1 MG/1
0.1 TABLET ORAL 2 TIMES DAILY
Qty: 60 TABLET | Refills: 11 | Status: SHIPPED | OUTPATIENT
Start: 2023-12-13 | End: 2023-12-13

## 2023-12-13 RX ADMIN — CLONIDINE HYDROCHLORIDE 0.1 MG: 0.1 TABLET ORAL at 05:12

## 2023-12-13 NOTE — LETTER
December 13, 2023      [facility]  [address] , [address]  [facility] [address]  Phone: [phone]  Fax: [phone]       Patient: Smith Love   YOB: 1990  Date of Visit: 12/13/2023    To Whom It May Concern:    Camila Love  was at [facility] on 12/13/2023. The patient may return to work/school on 12/15/23 with no restrictions. If you have any questions or concerns, or if I can be of further assistance, please do not hesitate to contact me.    Sincerely,    Ariella Taveras PA-C

## 2023-12-13 NOTE — PROGRESS NOTES
"  Subjective:      Patient ID: Smith Love is a 33 y.o. female.    Vitals:  height is 5' 6" (1.676 m) and weight is 93.6 kg (206 lb 5.6 oz). Her oral temperature is 98.8 °F (37.1 °C). Her blood pressure is 146/106 (abnormal) and her pulse is 75. Her respiration is 18 and oxygen saturation is 100%.     Chief Complaint: Nasal Congestion    34 y/o female presents with congestion and migraine since Monday. Patient treated with motrin.     ROS   Objective:     Physical Exam   HENT:   Head: Normocephalic.   Nose: Rhinorrhea present.   Mouth/Throat: No posterior oropharyngeal erythema.   Cardiovascular: Normal rate and normal pulses.   Pulmonary/Chest: No respiratory distress. She has no wheezes. She has no rales.   Abdominal: Normal appearance. She exhibits no distension. There is no abdominal tenderness. There is no guarding.   Neurological: She is alert.   Psychiatric: Her mood appears anxious.   Vitals reviewed.      Assessment:     1. Acute nonintractable headache, unspecified headache type    2. Hypertension, unspecified type        Here with covid concerns and a headache. She was negative for covid. She is very anxious about her blood pressure. I took it manually and it continued to go up. I offered her clonidine but she was too anxious to take it. I recommended she follow up with PCP to adjust blood pressure medications. She was instructed to hydrate and return to the clinic if new or worsening symptoms occur.    Plan:       Acute nonintractable headache, unspecified headache type  -     SARS Coronavirus 2 Antigen, POCT Manual Read    Hypertension, unspecified type  -     Discontinue: cloNIDine (CATAPRES) 0.1 MG tablet; Take 1 tablet (0.1 mg total) by mouth 2 (two) times daily.  Dispense: 60 tablet; Refill: 11  -     cloNIDine tablet 0.1 mg                    "

## 2023-12-13 NOTE — PATIENT INSTRUCTIONS
Your test was NEGATIVE for COVID-19 (coronavirus).      You may leave home and/or return to work when the following conditions are met:  24 hours fever free without fever-reducing medications AND  Improved symptoms  You are fully vaccinated or have not had close contact with someone with COVID-19 (within 6 feet for 15 minutes or more)    If you are fully vaccinated and had a close contact, there is no need for quarantine, unless you develop symptoms.      If you are not fully vaccinated and had a close contact:  Retest at 5 to 7 days post-exposure  If possible, it is recommended that you quarantine for 5 days from the time of contact regardless of your test status.  A mask should be worn indoors post quarantine.    If your symptoms worsen or if you have any other concerns, please contact [facility] at [phone].     Sincerely,    Ariella Taveras PA-C

## 2023-12-14 ENCOUNTER — PATIENT MESSAGE (OUTPATIENT)
Dept: OBSTETRICS AND GYNECOLOGY | Facility: CLINIC | Age: 33
End: 2023-12-14
Payer: COMMERCIAL

## 2023-12-14 ENCOUNTER — OFFICE VISIT (OUTPATIENT)
Dept: FAMILY MEDICINE | Facility: CLINIC | Age: 33
End: 2023-12-14
Attending: FAMILY MEDICINE
Payer: COMMERCIAL

## 2023-12-14 VITALS
TEMPERATURE: 98 F | DIASTOLIC BLOOD PRESSURE: 100 MMHG | HEIGHT: 66 IN | BODY MASS INDEX: 32.64 KG/M2 | RESPIRATION RATE: 20 BRPM | WEIGHT: 203.06 LBS | OXYGEN SATURATION: 99 % | HEART RATE: 101 BPM | SYSTOLIC BLOOD PRESSURE: 148 MMHG

## 2023-12-14 DIAGNOSIS — I10 HYPERTENSION, UNSPECIFIED TYPE: Primary | ICD-10-CM

## 2023-12-14 DIAGNOSIS — F41.9 ANXIETY: ICD-10-CM

## 2023-12-14 DIAGNOSIS — E66.9 OBESITY (BMI 30.0-34.9): ICD-10-CM

## 2023-12-14 PROCEDURE — 1159F MED LIST DOCD IN RCRD: CPT | Mod: CPTII,S$GLB,, | Performed by: FAMILY MEDICINE

## 2023-12-14 PROCEDURE — 99215 OFFICE O/P EST HI 40 MIN: CPT | Mod: S$GLB,,, | Performed by: FAMILY MEDICINE

## 2023-12-14 PROCEDURE — 3077F SYST BP >= 140 MM HG: CPT | Mod: CPTII,S$GLB,, | Performed by: FAMILY MEDICINE

## 2023-12-14 PROCEDURE — 99999 PR PBB SHADOW E&M-EST. PATIENT-LVL IV: CPT | Mod: PBBFAC,,, | Performed by: FAMILY MEDICINE

## 2023-12-14 PROCEDURE — 1160F RVW MEDS BY RX/DR IN RCRD: CPT | Mod: CPTII,S$GLB,, | Performed by: FAMILY MEDICINE

## 2023-12-14 PROCEDURE — 3080F DIAST BP >= 90 MM HG: CPT | Mod: CPTII,S$GLB,, | Performed by: FAMILY MEDICINE

## 2023-12-14 PROCEDURE — 3008F BODY MASS INDEX DOCD: CPT | Mod: CPTII,S$GLB,, | Performed by: FAMILY MEDICINE

## 2023-12-14 NOTE — PROGRESS NOTES
Smith Love    Chief Complaint   Patient presents with    Hypertension    Follow-up       History of Present Illness:   Ms. Love comes in today for blood pressure follow-up.  She is accompanied by her fiance Luis Angel Moses.  She states she was evaluated at urgent care on yesterday and tested negative for COVID but her blood pressure was elevated even with taking medications. She states she was told her blood pressure medication may not be giving her 24 hour coverage.  So, she took hydrochlorothiazide 25 mg tablet this morning then took amlodipine 5 mg tablet at 2:00 p.m.    She states she monitors her diet by intermittently fasting.  She states she has not been eating breads, pastas, meets and only drinks water or tea.  She states she exercises twice daily, 5 times per week with strengthening for 30 minutes and walking for 20 minutes.  She states she does not perform home blood pressure checks.  She thinks she has white coat hypertension.  She states she has history of asthma.    She saw Dr. Powers, cardiologist, on October 27, 2023 for primary hypertension, EKG abnormalities, bradycardia with HCTZ 25 mg daily added to help with blood pressure control. She states she was not able to stand increase of amlodipine to 10 mg daily.    She states she has been following with a therapist for anxiety and states she does not like to take blood pressure medication.    She states she is thinking about getting pregnant.      She complains of having left shoulder pain with exercising yesterday evening.  She denies having associated trauma.    Otherwise, she denies having fever, chills, fatigue, appetite changes; shortness of breath, cough, wheezing; chest pain, palpitations, leg swelling; abdominal pain, nausea, vomiting, diarrhea, constipation; unusual urinary symptoms; polydipsia, polyphagia, polyuria, hot or cold intolerance; back pain; acute visual changes, numbness, headache; depression, homicidal or suicidal  thoughts.         Labs:                    WBC                      5.52                11/17/2023                 HGB                      13.6                11/17/2023                 HCT                      40.1                11/17/2023                 PLT                      294                 11/17/2023                 CHOL                     106 (L)             03/12/2021                 TRIG                     30                  03/12/2021                 HDL                      42                  03/12/2021                 ALT                      21                  11/17/2023                 AST                      16                  11/17/2023                 NA                       139                 11/17/2023                 K                        3.5                 11/17/2023                 CL                       103                 11/17/2023                 CREATININE               0.9                 11/17/2023                 BUN                      7                   11/17/2023                 CO2                      24                  11/17/2023                 TSH                      2.418               10/26/2023                 GLUF                     148 (H)             08/29/2018                 HGBA1C                   8.2 (H)             11/09/2023                LDLCALC                  58.0 (L)            03/12/2021                  Current Outpatient Medications   Medication Sig    albuterol (PROVENTIL) 2.5 mg /3 mL (0.083 %) nebulizer solution Take 3 mLs (2.5 mg total) by nebulization every 4 to 6 hours as needed for Wheezing or Shortness of Breath.    albuterol (VENTOLIN HFA) 90 mcg/actuation inhaler Inhale 2 puffs into the lungs every 6 (six) hours as needed for Wheezing. Inhaler    amLODIPine (NORVASC) 5 MG tablet Take 1 tablet (5 mg total) by mouth once daily.    ascorbic acid, vitamin C, (VITAMIN C) 500 MG tablet Take 500 mg by mouth once daily.     azelastine (ASTELIN) 137 mcg (0.1 %) nasal spray 1 spray (137 mcg total) by Nasal route 2 (two) times daily.    fluticasone-salmeterol diskus inhaler 250-50 mcg Inhale 1 puff into the lungs 2 (two) times daily. Controller    folic acid (FOLVITE) 400 MCG tablet Take 400 mcg by mouth once daily.    hydroCHLOROthiazide (HYDRODIURIL) 25 MG tablet Take 1 tablet (25 mg total) by mouth once daily.    ibuprofen (ADVIL,MOTRIN) 800 MG tablet Take 1 tablet (800 mg total) by mouth 3 (three) times daily.    montelukast (SINGULAIR) 10 mg tablet Take 1 tablet (10 mg total) by mouth every evening.    zinc gluconate 50 mg tablet Take 50 mg by mouth once daily.    blood sugar diagnostic Strp 1 each by Misc.(Non-Drug; Combo Route) route 3 (three) times daily. (Patient not taking: Reported on 12/14/2023)    blood-glucose meter (BLOOD GLUCOSE MONITORING) kit Use as instructed (Patient not taking: Reported on 12/14/2023)    diclofenac sodium (VOLTAREN) 1 % Gel Apply 2 g topically 4 (four) times daily. for 10 days         Review of Systems   Constitutional:  Negative for activity change, appetite change, chills, fatigue and fever.   Eyes:  Negative for visual disturbance.   Respiratory:  Negative for cough, shortness of breath and wheezing.    Cardiovascular:  Negative for chest pain, palpitations and leg swelling.        See history of present illness.   Gastrointestinal:  Negative for abdominal pain, constipation, diarrhea, nausea and vomiting.   Endocrine: Negative for cold intolerance, heat intolerance, polydipsia, polyphagia and polyuria.   Genitourinary:  Negative for difficulty urinating.   Musculoskeletal:  Negative for back pain.   Neurological:  Negative for numbness and headaches.   Psychiatric/Behavioral:  Negative for dysphoric mood and suicidal ideas. The patient is nervous/anxious.         Negative for homicidal ideas.       Objective:  Physical Exam  Vitals reviewed.   Constitutional:       General: She is not in acute  distress.     Appearance: Normal appearance. She is obese. She is not ill-appearing, toxic-appearing or diaphoretic.      Comments: Pleasant.   Cardiovascular:      Rate and Rhythm: Normal rate and regular rhythm.      Pulses: Normal pulses.      Heart sounds: No murmur heard.  Pulmonary:      Effort: Pulmonary effort is normal. No respiratory distress.      Breath sounds: Normal breath sounds. No wheezing.   Abdominal:      General: Bowel sounds are normal. There is no distension.      Palpations: Abdomen is soft. There is no mass.      Tenderness: There is no abdominal tenderness. There is no guarding or rebound.   Musculoskeletal:         General: No swelling or tenderness. Normal range of motion.      Cervical back: Normal range of motion and neck supple. No tenderness.      Comments: She is ambulatory without problems. Non tender left shoulder with full range of motion noted.   Lymphadenopathy:      Cervical: No cervical adenopathy.   Skin:     General: Skin is warm.   Neurological:      General: No focal deficit present.      Mental Status: She is alert and oriented to person, place, and time.   Psychiatric:         Mood and Affect: Mood normal.         Behavior: Behavior normal.         Thought Content: Thought content normal.         Judgment: Judgment normal.         ASSESSMENT:  1. Hypertension, unspecified type    2. Anxiety    3. Obesity (BMI 30.0-34.9)        PLAN:  Smith was seen today for hypertension and follow-up.    Diagnoses and all orders for this visit:    Hypertension, unspecified type    Anxiety    Obesity (BMI 30.0-34.9)    No labs today.  Continue current medications, follow low sodium, low cholesterol, low carb diet, daily walks.  I discussed with patient options for treatment of high blood pressure; however, patient desires to continue current medications with stricter lifestyle changes for at least 2 more weeks.  Home blood pressure monitoring advised with blood pressure goal <  140/90.  Keep follow up with specialists.  Patient declines flu shot today.  Follow up in about 2 weeks (around 12/28/2023) for hypertension follow up.      40 minutes of total time spent on the encounter, which includes face to face time and non-face to face time preparing to see the patient (eg, review of tests), Obtaining and/or reviewing separately obtained history, Documenting clinical information in the electronic or other health record, Independently interpreting results (not separately reported) and communicating results to the patient/family/caregiver, or Care coordination (not separately reported).      This note is  generated with speech recognition software and is subject to transcription error and sound alike phrases that may be missed by proofreading.

## 2023-12-15 ENCOUNTER — PATIENT MESSAGE (OUTPATIENT)
Dept: FAMILY MEDICINE | Facility: CLINIC | Age: 33
End: 2023-12-15
Payer: COMMERCIAL

## 2023-12-15 RX ORDER — AMLODIPINE BESYLATE 5 MG/1
5 TABLET ORAL DAILY
Qty: 90 TABLET | Refills: 1 | Status: SHIPPED | OUTPATIENT
Start: 2023-12-15 | End: 2024-03-31

## 2023-12-15 NOTE — TELEPHONE ENCOUNTER
No care due was identified.  [facility] Embedded Care Due Messages. Reference number: [id number].   12/15/2023 8:19:13 AM CST

## 2024-01-01 PROBLEM — E66.811 OBESITY (BMI 30.0-34.9): Status: ACTIVE | Noted: 2024-01-01

## 2024-01-01 PROBLEM — E66.9 OBESITY (BMI 30.0-34.9): Status: ACTIVE | Noted: 2024-01-01

## 2024-01-02 ENCOUNTER — OFFICE VISIT (OUTPATIENT)
Dept: URGENT CARE | Facility: CLINIC | Age: 34
End: 2024-01-02
Payer: COMMERCIAL

## 2024-01-02 VITALS
RESPIRATION RATE: 18 BRPM | TEMPERATURE: 98 F | DIASTOLIC BLOOD PRESSURE: 97 MMHG | HEART RATE: 88 BPM | BODY MASS INDEX: 32.24 KG/M2 | SYSTOLIC BLOOD PRESSURE: 143 MMHG | OXYGEN SATURATION: 100 % | WEIGHT: 200.63 LBS | HEIGHT: 66 IN

## 2024-01-02 DIAGNOSIS — J06.9 VIRAL URI: ICD-10-CM

## 2024-01-02 DIAGNOSIS — R09.81 NASAL CONGESTION: Primary | ICD-10-CM

## 2024-01-02 PROCEDURE — 87502 INFLUENZA DNA AMP PROBE: CPT | Mod: QW,S$GLB,,

## 2024-01-02 PROCEDURE — 99213 OFFICE O/P EST LOW 20 MIN: CPT | Mod: S$GLB,,,

## 2024-01-02 PROCEDURE — 87811 SARS-COV-2 COVID19 W/OPTIC: CPT | Mod: QW,S$GLB,,

## 2024-01-02 NOTE — PROGRESS NOTES
"Subjective:      Patient ID: Smith Love is a 33 y.o. female.    Vitals:  height is 5' 6" (1.676 m) and weight is 91 kg (200 lb 9.9 oz). Her oral temperature is 98 °F (36.7 °C). Her blood pressure is 143/97 (abnormal) and her pulse is 88. Her respiration is 18 and oxygen saturation is 100%.     Chief Complaint: Nasal Congestion    34 y/o female here for nasal congestion/ runny nose, right ear pain, headaches with bodyaches since 12/30. Pt has taken Coricidin Cold and flu and Tylenol with no fever. Pt has also been using Flonase and Astelin.      Constitution: Negative for chills and fever.   HENT:  Positive for ear pain and congestion. Negative for postnasal drip and sore throat.    Respiratory:  Negative for cough and wheezing.    Gastrointestinal:  Negative for nausea and vomiting.   Neurological:  Positive for headaches.      Objective:     Physical Exam   Constitutional: She is oriented to person, place, and time. She appears well-developed. She is cooperative.  Non-toxic appearance. She does not appear ill. No distress.   HENT:   Head: Normocephalic and atraumatic.   Ears:   Right Ear: Hearing, tympanic membrane, external ear and ear canal normal.   Left Ear: Hearing, tympanic membrane, external ear and ear canal normal.   Nose: Congestion present. No mucosal edema or rhinorrhea. Right sinus exhibits no maxillary sinus tenderness and no frontal sinus tenderness. Left sinus exhibits no maxillary sinus tenderness and no frontal sinus tenderness.   Mouth/Throat: Uvula is midline and mucous membranes are normal. No trismus in the jaw. No uvula swelling. No oropharyngeal exudate, posterior oropharyngeal edema or posterior oropharyngeal erythema.   Eyes: Lids are normal.   Neck: Trachea normal and phonation normal. Neck supple. No edema present. No erythema present.   Cardiovascular: Normal rate, regular rhythm, normal heart sounds and normal pulses.   Pulmonary/Chest: Effort normal and breath sounds " normal. No respiratory distress. She has no decreased breath sounds. She has no wheezes. She has no rhonchi.   Abdominal: Normal appearance.   Musculoskeletal: Normal range of motion.         General: Normal range of motion.   Lymphadenopathy:     She has no cervical adenopathy.   Neurological: She is alert and oriented to person, place, and time. She exhibits normal muscle tone.   Skin: Skin is warm, dry, intact and not diaphoretic.   Psychiatric: Her speech is normal and behavior is normal.   Nursing note and vitals reviewed.    Results for orders placed or performed in visit on 01/02/24   POCT Influenza A/B MOLECULAR   Result Value Ref Range    POC Molecular Influenza A Ag Negative Negative, Not Reported    POC Molecular Influenza B Ag Negative Negative, Not Reported     Acceptable Yes    SARS Coronavirus 2 Antigen, POCT Manual Read   Result Value Ref Range    SARS Coronavirus 2 Antigen Negative Negative     Acceptable Yes        Assessment:     1. Nasal congestion    2. Viral URI        Plan:       Nasal congestion  -     POCT Influenza A/B MOLECULAR  -     SARS Coronavirus 2 Antigen, POCT Manual Read    Viral URI    Patient no acute distress.  Vital signs reassuring.  Reviewed negative strep and COVID test results in detail with patient.  Discussed that symptoms are likely due to viral upper respiratory infection or allergies.  Discussed that both are self-limiting.  Discussed OTC medications in detail. Discussed the importance of further evaluation if symptoms worsen. Patient stated verbal understanding.    Patient Instructions     Patient Instructions   PLEASE READ YOUR DISCHARGE INSTRUCTIONS ENTIRELY AS IT CONTAINS IMPORTANT INFORMATION.     Please drink plenty of fluids.     Please get plenty of rest.     Please return here or go to the Emergency Department for any concerns or worsening of condition.     Please take an over the counter antihistamine medication  (allegra/Claritin/Zyrtec) of your choice as directed.     Try an over the counter decongestant like Mucinex D or Sudafed. You buy this behind the pharmacy counter     If you do have Hypertension or palpitations, it is safe to take Coricidin HBP for relief of sinus symptoms.     If not allergic, please take over the counter Tylenol (Acetaminophen) and/or Motrin (Ibuprofen) as directed for control of pain and/or fever.  Please follow up with your primary care doctor or specialist as needed.     Sore throat recommendations: Warm fluids, warm salt water gargles, throat lozenges, tea, honey, soup, rest, hydration.     Use over the counter flonase: one spray each nostril twice daily OR two sprays each nostril once daily.      If you  smoke, please stop smoking.     Please return or see your primary care doctor if you develop new or worsening symptoms.      Please arrange follow up with your primary medical clinic as soon as possible. You must understand that you've received an Urgent Care treatment only and that you may be released before all of your medical problems are known or treated. You, the patient, will arrange for follow up as instructed. If your symptoms worsen or fail to improve you should go to the Emergency Room.

## 2024-01-05 ENCOUNTER — OFFICE VISIT (OUTPATIENT)
Dept: FAMILY MEDICINE | Facility: CLINIC | Age: 34
End: 2024-01-05
Attending: FAMILY MEDICINE
Payer: COMMERCIAL

## 2024-01-05 VITALS
SYSTOLIC BLOOD PRESSURE: 130 MMHG | HEIGHT: 66 IN | RESPIRATION RATE: 18 BRPM | HEART RATE: 99 BPM | DIASTOLIC BLOOD PRESSURE: 98 MMHG | BODY MASS INDEX: 32.31 KG/M2 | TEMPERATURE: 98 F | WEIGHT: 201.06 LBS | OXYGEN SATURATION: 98 %

## 2024-01-05 DIAGNOSIS — I10 HYPERTENSION, UNSPECIFIED TYPE: Primary | ICD-10-CM

## 2024-01-05 DIAGNOSIS — E66.9 OBESITY (BMI 30.0-34.9): ICD-10-CM

## 2024-01-05 PROCEDURE — 3008F BODY MASS INDEX DOCD: CPT | Mod: CPTII,S$GLB,, | Performed by: FAMILY MEDICINE

## 2024-01-05 PROCEDURE — 99999 PR PBB SHADOW E&M-EST. PATIENT-LVL V: CPT | Mod: PBBFAC,,, | Performed by: FAMILY MEDICINE

## 2024-01-05 PROCEDURE — 3075F SYST BP GE 130 - 139MM HG: CPT | Mod: CPTII,S$GLB,, | Performed by: FAMILY MEDICINE

## 2024-01-05 PROCEDURE — 1160F RVW MEDS BY RX/DR IN RCRD: CPT | Mod: CPTII,S$GLB,, | Performed by: FAMILY MEDICINE

## 2024-01-05 PROCEDURE — 1159F MED LIST DOCD IN RCRD: CPT | Mod: CPTII,S$GLB,, | Performed by: FAMILY MEDICINE

## 2024-01-05 PROCEDURE — 99214 OFFICE O/P EST MOD 30 MIN: CPT | Mod: S$GLB,,, | Performed by: FAMILY MEDICINE

## 2024-01-05 PROCEDURE — 3080F DIAST BP >= 90 MM HG: CPT | Mod: CPTII,S$GLB,, | Performed by: FAMILY MEDICINE

## 2024-01-05 RX ORDER — BUSPIRONE HYDROCHLORIDE 5 MG/1
5 TABLET ORAL 3 TIMES DAILY
COMMUNITY
Start: 2024-01-04

## 2024-01-05 RX ORDER — AMLODIPINE BESYLATE 2.5 MG/1
2.5 TABLET ORAL DAILY
Qty: 90 TABLET | Refills: 1 | Status: SHIPPED | OUTPATIENT
Start: 2024-01-05

## 2024-01-05 NOTE — PROGRESS NOTES
Smith Love    Chief Complaint   Patient presents with    Follow-up    Hypertension       History of Present Illness:   Ms. Love comes in today for 2-week follow-up of elevated blood pressure.  She states she is not fasting but has taken medications today. She states she continues to take amlodipine 5 mg daily and hydrochlorothiazide 25 mg daily for blood pressure control without problems.  She states she has only checked her blood pressure once since her last visit with me on December 14, 2023 as she states her meter does not work. She states her blood pressure when checked was 120/89.  However, she states she was evaluated at [facility] on December 22, 2023 with blood pressure of 138/89.  She states she was evaluated at [facility] on January 2, 2024 with occasional light-headedness and blood pressure of 143/97 during her visit.      She states she has been receiving therapy with BEATRIZ Cervantes, therapist, every 2 weeks for anxiety. She states Ms. Cervantes thinks her blood pressure elevations are due to stress and told her Inderal may be good for her for anxiety and elevated blood pressure; however, she states she was started on buspirone for anxiety.    She states she exercises 7 times a week, 45 minutes each time and monitors her diet.  She states she has history of asthma.  She again states she and her desire at some point to conceive and do not use contraception.    Otherwise, she denies having fever, chills, fatigue, appetite changes; shortness of breath, cough, wheezing; chest pain, palpitations, leg swelling; abdominal pain, nausea, vomiting, diarrhea, constipation; unusual urinary symptoms; polydipsia, polyphagia, polyuria, hot or cold intolerance; back pain; numbness, headache; depression, homicidal or suicidal thoughts.             Labs:                    WBC                      5.52                11/17/2023                 HGB                      13.6                 11/17/2023                 HCT                      40.1                11/17/2023                 PLT                      294                 11/17/2023                 CHOL                     106 (L)             03/12/2021                 TRIG                     30                  03/12/2021                 HDL                      42                  03/12/2021                 ALT                      21                  11/17/2023                 AST                      16                  11/17/2023                 NA                       139                 11/17/2023                 K                        3.5                 11/17/2023                 CL                       103                 11/17/2023                 CREATININE               0.9                 11/17/2023                 BUN                      7                   11/17/2023                 CO2                      24                  11/17/2023                 TSH                      2.418               10/26/2023                 GLUF                     148 (H)             08/29/2018                 HGBA1C                   8.2 (H)             11/09/2023                LDLCALC                  58.0 (L)            03/12/2021                Current Outpatient Medications   Medication Sig    albuterol (PROVENTIL) 2.5 mg /3 mL (0.083 %) nebulizer solution Take 3 mLs (2.5 mg total) by nebulization every 4 to 6 hours as needed for Wheezing or Shortness of Breath.    albuterol (VENTOLIN HFA) 90 mcg/actuation inhaler Inhale 2 puffs into the lungs every 6 (six) hours as needed for Wheezing. Inhaler    amLODIPine (NORVASC) 5 MG tablet Take 1 tablet (5 mg total) by mouth once daily.    ascorbic acid, vitamin C, (VITAMIN C) 500 MG tablet Take 500 mg by mouth once daily.    azelastine (ASTELIN) 137 mcg (0.1 %) nasal spray 1 spray (137 mcg total) by Nasal route 2 (two) times daily.    blood sugar diagnostic Strp 1 each by Misc.(Non-Drug;  Combo Route) route 3 (three) times daily.    blood-glucose meter (BLOOD GLUCOSE MONITORING) kit Use as instructed    busPIRone (BUSPAR) 5 MG Tab Take 5 mg by mouth 3 (three) times daily.    fluticasone-salmeterol diskus inhaler 250-50 mcg Inhale 1 puff into the lungs 2 (two) times daily. Controller    folic acid (FOLVITE) 400 MCG tablet Take 400 mcg by mouth once daily.    hydroCHLOROthiazide (HYDRODIURIL) 25 MG tablet Take 1 tablet (25 mg total) by mouth once daily.    ibuprofen (ADVIL,MOTRIN) 800 MG tablet Take 1 tablet (800 mg total) by mouth 3 (three) times daily.    montelukast (SINGULAIR) 10 mg tablet Take 1 tablet (10 mg total) by mouth every evening.    zinc gluconate 50 mg tablet Take 50 mg by mouth once daily.    diclofenac sodium (VOLTAREN) 1 % Gel Apply 2 g topically 4 (four) times daily. for 10 days       Review of Systems   Constitutional:  Negative for activity change, appetite change, chills, fatigue and fever.        Weight 92.1 kg (203 lb 0.7 oz) at December 14, 2023 visit.   Eyes:  Negative for visual disturbance.   Respiratory:  Negative for cough, shortness of breath and wheezing.    Cardiovascular:  Negative for chest pain, palpitations and leg swelling.        See history of present illness.   Gastrointestinal:  Negative for abdominal pain, constipation, diarrhea, nausea and vomiting.   Endocrine: Negative for cold intolerance, heat intolerance, polydipsia, polyphagia and polyuria.   Genitourinary:  Negative for difficulty urinating.   Musculoskeletal:  Negative for back pain.   Neurological:  Positive for light-headedness. Negative for numbness and headaches.   Psychiatric/Behavioral:  Negative for dysphoric mood and suicidal ideas. The patient is nervous/anxious.         Negative for homicidal ideas.       Objective:  Physical Exam  Vitals reviewed.   Constitutional:       General: She is not in acute distress.     Appearance: Normal appearance. She is obese. She is not ill-appearing,  toxic-appearing or diaphoretic.      Comments: Pleasant.   Cardiovascular:      Rate and Rhythm: Normal rate and regular rhythm.      Pulses: Normal pulses.      Heart sounds: No murmur heard.  Pulmonary:      Effort: Pulmonary effort is normal. No respiratory distress.      Breath sounds: Normal breath sounds. No wheezing.   Abdominal:      General: Bowel sounds are normal. There is no distension.      Palpations: Abdomen is soft. There is no mass.      Tenderness: There is no abdominal tenderness. There is no guarding or rebound.   Musculoskeletal:         General: No swelling or tenderness. Normal range of motion.      Cervical back: Normal range of motion and neck supple. No tenderness.      Comments: She is ambulatory without problems.    Lymphadenopathy:      Cervical: No cervical adenopathy.   Skin:     General: Skin is warm.   Neurological:      General: No focal deficit present.      Mental Status: She is alert and oriented to person, place, and time.      Cranial Nerves: No cranial nerve deficit.   Psychiatric:         Mood and Affect: Mood normal.         Behavior: Behavior normal.         Thought Content: Thought content normal.         Judgment: Judgment normal.         ASSESSMENT:  1. Hypertension, unspecified type    2. Obesity (BMI 30.0-34.9)        PLAN:  Smith was seen today for follow-up and hypertension.    Diagnoses and all orders for this visit:    Hypertension, unspecified type  -     amLODIPine (NORVASC) 2.5 MG tablet; Take 1 tablet (2.5 mg total) by mouth once daily.    Obesity (BMI 30.0-34.9)      Continue current medications, follow low sodium, low cholesterol, low carb diet, daily walks.  But, add Amlodipine 2.5 mg daily for improved blood pressure control; medication precautions discussed with patient.  Again, advised patient to use barrier protection for contraception for now during use of Amlodipine for attempt for blood pressure control.  Keep follow up with specialists.  Follow  up in about 6 weeks (around 2/16/2024) for hypertension follow up.    30 minutes of total time spent on the encounter, which includes face to face time and non-face to face time preparing to see the patient (eg, review of tests), Obtaining and/or reviewing separately obtained history, Documenting clinical information in the electronic or other health record, Independently interpreting results (not separately reported) and communicating results to the patient/family/caregiver, or Care coordination (not separately reported).      This note is  generated with speech recognition software and is subject to transcription error and sound alike phrases that may be missed by proofreading.

## 2024-02-16 ENCOUNTER — OFFICE VISIT (OUTPATIENT)
Dept: FAMILY MEDICINE | Facility: CLINIC | Age: 34
End: 2024-02-16
Attending: FAMILY MEDICINE
Payer: COMMERCIAL

## 2024-02-16 VITALS
HEIGHT: 66 IN | BODY MASS INDEX: 32.03 KG/M2 | SYSTOLIC BLOOD PRESSURE: 122 MMHG | TEMPERATURE: 98 F | DIASTOLIC BLOOD PRESSURE: 89 MMHG | RESPIRATION RATE: 18 BRPM | HEART RATE: 86 BPM | WEIGHT: 199.31 LBS

## 2024-02-16 DIAGNOSIS — I10 HYPERTENSION, UNSPECIFIED TYPE: ICD-10-CM

## 2024-02-16 DIAGNOSIS — R73.09 ELEVATED HEMOGLOBIN A1C: Primary | ICD-10-CM

## 2024-02-16 DIAGNOSIS — E66.9 OBESITY (BMI 30.0-34.9): ICD-10-CM

## 2024-02-16 PROCEDURE — 3074F SYST BP LT 130 MM HG: CPT | Mod: CPTII,S$GLB,, | Performed by: FAMILY MEDICINE

## 2024-02-16 PROCEDURE — 99999 PR PBB SHADOW E&M-EST. PATIENT-LVL V: CPT | Mod: PBBFAC,,, | Performed by: FAMILY MEDICINE

## 2024-02-16 PROCEDURE — 3079F DIAST BP 80-89 MM HG: CPT | Mod: CPTII,S$GLB,, | Performed by: FAMILY MEDICINE

## 2024-02-16 PROCEDURE — 99214 OFFICE O/P EST MOD 30 MIN: CPT | Mod: S$GLB,,, | Performed by: FAMILY MEDICINE

## 2024-02-16 PROCEDURE — 3008F BODY MASS INDEX DOCD: CPT | Mod: CPTII,S$GLB,, | Performed by: FAMILY MEDICINE

## 2024-02-16 NOTE — PROGRESS NOTES
Smith Love    Chief Complaint   Patient presents with    Hypertension    Follow-up       History of Present Illness:   Ms. Love comes in today for 6-week hypertension follow-up.  She states she is not fasting but has taken medications today.  She states she has been taking amlodipine 2.5 mg daily for blood pressure control without problems since January 5, 2024.  She states she performs home blood pressure checks daily with level of 117/82 with pulse 71 on yesterday and level of 132/80 last week with using wrist monitor.  She states she exercises 5 times a week, 30 minutes each time with walking and 30 minutes with strengthening.  She states she monitors her diet.    She complains of having slight headache but feels likely due to start of menses.    Otherwise, she denies having fever, chills, fatigue, appetite changes; shortness of breath, cough, wheezing; chest pain, palpitations, leg swelling; abdominal pain, nausea, vomiting, diarrhea, constipation; unusual urinary symptoms; polydipsia, polyphagia, polyuria, hot or cold intolerance; back pain; acute visual changes, numbness; anxiety, depression, homicidal or suicidal thoughts.               Labs:                    WBC                      5.52                11/17/2023                 HGB                      13.6                11/17/2023                 HCT                      40.1                11/17/2023                 PLT                      294                 11/17/2023                 CHOL                     106 (L)             03/12/2021                 TRIG                     30                  03/12/2021                 HDL                      42                  03/12/2021                 ALT                      21                  11/17/2023                 AST                      16                  11/17/2023                 NA                       139                 11/17/2023                 K                        3.5                  11/17/2023                 CL                       103                 11/17/2023                 CREATININE               0.9                 11/17/2023                 BUN                      7                   11/17/2023                 CO2                      24                  11/17/2023                 TSH                      2.418               10/26/2023                 GLUF                     148 (H)             08/29/2018                 HGBA1C                   8.2 (H)             11/09/2023           LDLCALC                  58.0 (L)            03/12/2021                  Current Outpatient Medications   Medication Sig    albuterol (PROVENTIL) 2.5 mg /3 mL (0.083 %) nebulizer solution Take 3 mLs (2.5 mg total) by nebulization every 4 to 6 hours as needed for Wheezing or Shortness of Breath.    albuterol (VENTOLIN HFA) 90 mcg/actuation inhaler Inhale 2 puffs into the lungs every 6 (six) hours as needed for Wheezing. Inhaler    amLODIPine (NORVASC) 2.5 MG tablet Take 1 tablet (2.5 mg total) by mouth once daily.    amLODIPine (NORVASC) 5 MG tablet Take 1 tablet (5 mg total) by mouth once daily.    ascorbic acid, vitamin C, (VITAMIN C) 500 MG tablet Take 500 mg by mouth once daily.    azelastine (ASTELIN) 137 mcg (0.1 %) nasal spray 1 spray (137 mcg total) by Nasal route 2 (two) times daily.    blood sugar diagnostic Strp 1 each by Misc.(Non-Drug; Combo Route) route 3 (three) times daily.    blood-glucose meter (BLOOD GLUCOSE MONITORING) kit Use as instructed    busPIRone (BUSPAR) 5 MG Tab Take 5 mg by mouth 3 (three) times daily.    fluticasone-salmeterol diskus inhaler 250-50 mcg Inhale 1 puff into the lungs 2 (two) times daily. Controller    hydroCHLOROthiazide (HYDRODIURIL) 25 MG tablet Take 1 tablet (25 mg total) by mouth once daily.    ibuprofen (ADVIL,MOTRIN) 800 MG tablet Take 1 tablet (800 mg total) by mouth 3 (three) times daily.    montelukast (SINGULAIR) 10 mg tablet Take 1  tablet (10 mg total) by mouth every evening.    zinc gluconate 50 mg tablet Take 50 mg by mouth once daily.    diclofenac sodium (VOLTAREN) 1 % Gel Apply 2 g topically 4 (four) times daily. for 10 days         Review of Systems   Constitutional:  Negative for activity change, appetite change, chills, fatigue and fever.   Eyes:  Negative for visual disturbance.   Respiratory:  Negative for cough, shortness of breath and wheezing.    Cardiovascular:  Negative for chest pain, palpitations and leg swelling.   Gastrointestinal:  Negative for abdominal pain, constipation, diarrhea, nausea and vomiting.   Endocrine: Negative for cold intolerance, heat intolerance, polydipsia, polyphagia and polyuria.   Genitourinary:  Negative for difficulty urinating and menstrual problem.   Musculoskeletal:  Negative for back pain.   Neurological:  Positive for headaches. Negative for numbness.   Psychiatric/Behavioral:  Negative for dysphoric mood and suicidal ideas. The patient is not nervous/anxious.         Negative for homicidal ideas.       Objective:  Physical Exam  Vitals reviewed.   Constitutional:       General: She is not in acute distress.     Appearance: Normal appearance. She is obese. She is not ill-appearing, toxic-appearing or diaphoretic.      Comments: Pleasant.   Cardiovascular:      Rate and Rhythm: Normal rate and regular rhythm.      Pulses: Normal pulses.      Heart sounds: No murmur heard.  Pulmonary:      Effort: Pulmonary effort is normal. No respiratory distress.      Breath sounds: Normal breath sounds. No wheezing.   Abdominal:      General: Bowel sounds are normal. There is no distension.      Palpations: Abdomen is soft. There is no mass.      Tenderness: There is no abdominal tenderness. There is no guarding or rebound.   Musculoskeletal:         General: No swelling or tenderness. Normal range of motion.      Cervical back: Normal range of motion and neck supple. No tenderness.      Comments: She is  ambulatory without problems.    Lymphadenopathy:      Cervical: No cervical adenopathy.   Skin:     General: Skin is warm.   Neurological:      General: No focal deficit present.      Mental Status: She is alert and oriented to person, place, and time.   Psychiatric:         Mood and Affect: Mood normal.         Behavior: Behavior normal.         Thought Content: Thought content normal.         Judgment: Judgment normal.         ASSESSMENT:  1. Elevated hemoglobin A1c    2. Hypertension, unspecified type    3. Obesity (BMI 30.0-34.9)        PLAN:  Smith was seen today for hypertension and follow-up.    Diagnoses and all orders for this visit:    Elevated hemoglobin A1c  -     Hemoglobin A1C; Future    Hypertension, unspecified type    Obesity (BMI 30.0-34.9)      Patient desires A1c in March 2024. Patient advised to call for results.  Continue current medications, follow low sodium, low cholesterol, low carb diet, daily walks.  Keep follow up with specialists.  Follow up in about 6 months (around 8/16/2024) for physical.

## 2024-03-05 ENCOUNTER — OFFICE VISIT (OUTPATIENT)
Dept: FAMILY MEDICINE | Facility: CLINIC | Age: 34
End: 2024-03-05
Attending: FAMILY MEDICINE
Payer: COMMERCIAL

## 2024-03-05 VITALS
RESPIRATION RATE: 18 BRPM | OXYGEN SATURATION: 99 % | HEART RATE: 83 BPM | BODY MASS INDEX: 32.17 KG/M2 | HEIGHT: 66 IN | SYSTOLIC BLOOD PRESSURE: 128 MMHG | DIASTOLIC BLOOD PRESSURE: 86 MMHG | WEIGHT: 200.19 LBS | TEMPERATURE: 98 F

## 2024-03-05 DIAGNOSIS — R68.84 JAW PAIN: ICD-10-CM

## 2024-03-05 DIAGNOSIS — F41.1 GAD (GENERALIZED ANXIETY DISORDER): Primary | ICD-10-CM

## 2024-03-05 DIAGNOSIS — F45.8 BRUXISM (TEETH GRINDING): ICD-10-CM

## 2024-03-05 DIAGNOSIS — E66.9 OBESITY (BMI 30.0-34.9): ICD-10-CM

## 2024-03-05 PROCEDURE — 3074F SYST BP LT 130 MM HG: CPT | Mod: CPTII,S$GLB,, | Performed by: FAMILY MEDICINE

## 2024-03-05 PROCEDURE — 3008F BODY MASS INDEX DOCD: CPT | Mod: CPTII,S$GLB,, | Performed by: FAMILY MEDICINE

## 2024-03-05 PROCEDURE — 3079F DIAST BP 80-89 MM HG: CPT | Mod: CPTII,S$GLB,, | Performed by: FAMILY MEDICINE

## 2024-03-05 PROCEDURE — 99999 PR PBB SHADOW E&M-EST. PATIENT-LVL V: CPT | Mod: PBBFAC,,, | Performed by: FAMILY MEDICINE

## 2024-03-05 PROCEDURE — 99214 OFFICE O/P EST MOD 30 MIN: CPT | Mod: S$GLB,,, | Performed by: FAMILY MEDICINE

## 2024-03-05 NOTE — LETTER
March 5, 2024      [facility]  [address] [place] [address]  Phone: [phone]  Fax: [phone]       Patient: Smith Love   YOB: 1990  Date of Visit: 03/05/2024    To Whom It May Concern:    Camila Love  was at [facility] on 03/05/2024. The patient may return to work tomorrow ( 03/06/2024)  with no restrictions. If you have any questions or concerns, or if I can be of further assistance, please do not hesitate to contact me.    Sincerely,    Dr. Caro Hernandez MD

## 2024-03-05 NOTE — PROGRESS NOTES
Smith Love    Chief Complaint   Patient presents with    Panic Attack       History of Present Illness:   Ms. Love comes in today with complaint of having panic attacks.  She states she recently started having panic especially when she is in larger group settings (movie theater, [employer] basketball game).  She states she experiences hand numbness, tingling; teeth chattering; and cold sensitivity which lasts minutes.  She states she focuses and breathes, and anxiety goes away. She states she currently sees a therapist Melinda Waters LCSW for ERROL every other week with last visit on February 19, 2024.  She states she also follows with NP Miryam Cervantes for ERROL every 6 months with last visit on January 18, 2024.  She states she takes BuSpar 5 mg twice daily instead of prescribed 3 times daily dosing for ERROL.    She states she grinds her teeth and has pain in her right jaw.  She states she was told by her dentist she may need a filling replaced. She states she has not taken medication for pain relief.    She denies having fever, chills, fatigue, appetite changes; shortness of breath, cough, wheezing; chest pain, palpitations, leg swelling abdominal pain, nausea, vomiting, diarrhea, constipation; unusual urinary symptoms; polydipsia, polyphagia, polyuria, hot intolerance; back pain; headache; depression, homicidal or suicidal thoughts.         Labs:       Component                Value               Date                       WBC                      5.52                11/17/2023                 HGB                      13.6                11/17/2023                 HCT                      40.1                11/17/2023                 PLT                      294                 11/17/2023                 CHOL                     106 (L)             03/12/2021                 TRIG                     30                  03/12/2021                 HDL                      42                   03/12/2021                 ALT                      21                  11/17/2023                 AST                      16                  11/17/2023                 NA                       139                 11/17/2023                 K                        3.5                 11/17/2023                 CL                       103                 11/17/2023                 CREATININE               0.9                 11/17/2023                 BUN                      7                   11/17/2023                 CO2                      24                  11/17/2023                 TSH                      2.418               10/26/2023                 GLUF                     148 (H)             08/29/2018                 HGBA1C                   8.2 (H)             11/09/2023               LDLCALC                  58.0 (L)            03/12/2021                  Current Outpatient Medications   Medication Sig    albuterol (PROVENTIL) 2.5 mg /3 mL (0.083 %) nebulizer solution Take 3 mLs (2.5 mg total) by nebulization every 4 to 6 hours as needed for Wheezing or Shortness of Breath.    albuterol (VENTOLIN HFA) 90 mcg/actuation inhaler Inhale 2 puffs into the lungs every 6 (six) hours as needed for Wheezing. Inhaler    amLODIPine (NORVASC) 2.5 MG tablet Take 1 tablet (2.5 mg total) by mouth once daily.    amLODIPine (NORVASC) 5 MG tablet Take 1 tablet (5 mg total) by mouth once daily.    azelastine (ASTELIN) 137 mcg (0.1 %) nasal spray 1 spray (137 mcg total) by Nasal route 2 (two) times daily.    blood sugar diagnostic Strp 1 each by Misc.(Non-Drug; Combo Route) route 3 (three) times daily.    blood-glucose meter (BLOOD GLUCOSE MONITORING) kit Use as instructed    busPIRone (BUSPAR) 5 MG Tab Take 5 mg by mouth 3 (three) times daily.    fluticasone-salmeterol diskus inhaler 250-50 mcg Inhale 1 puff into the lungs 2 (two) times daily. Controller    hydroCHLOROthiazide (HYDRODIURIL) 25 MG tablet Take 1  tablet (25 mg total) by mouth once daily.    ibuprofen (ADVIL,MOTRIN) 800 MG tablet Take 1 tablet (800 mg total) by mouth 3 (three) times daily.    montelukast (SINGULAIR) 10 mg tablet Take 1 tablet (10 mg total) by mouth every evening.    ascorbic acid, vitamin C, (VITAMIN C) 500 MG tablet Take 500 mg by mouth once daily.    diclofenac sodium (VOLTAREN) 1 % Gel Apply 2 g topically 4 (four) times daily. for 10 days       Review of Systems   Constitutional:  Negative for activity change, appetite change, chills, fatigue and fever.   Respiratory:  Negative for cough, shortness of breath and wheezing.    Cardiovascular:  Negative for chest pain, palpitations and leg swelling.   Gastrointestinal:  Negative for abdominal pain, constipation, diarrhea, nausea and vomiting.   Endocrine: Positive for cold intolerance. Negative for heat intolerance, polydipsia, polyphagia and polyuria.   Genitourinary:  Negative for difficulty urinating.   Musculoskeletal:  Negative for back pain.   Neurological:  Positive for numbness. Negative for headaches.   Psychiatric/Behavioral:  Negative for dysphoric mood and suicidal ideas. The patient is nervous/anxious.         Negative for homicidal ideas.       Objective:  Physical Exam  Vitals reviewed.   Constitutional:       General: She is not in acute distress.     Appearance: Normal appearance. She is obese. She is not ill-appearing, toxic-appearing or diaphoretic.      Comments: Pleasant.   HENT:      Head: Normocephalic and atraumatic.      Comments: Non tender sinuses.     Right Ear: Tympanic membrane, ear canal and external ear normal. There is no impacted cerumen.      Left Ear: Tympanic membrane, ear canal and external ear normal. There is no impacted cerumen.      Nose: Nose normal. No congestion or rhinorrhea.      Mouth/Throat:      Mouth: Mucous membranes are moist.      Pharynx: Oropharynx is clear. No oropharyngeal exudate or posterior oropharyngeal erythema.   Cardiovascular:       Rate and Rhythm: Normal rate and regular rhythm.      Pulses: Normal pulses.      Heart sounds: No murmur heard.  Pulmonary:      Effort: Pulmonary effort is normal. No respiratory distress.      Breath sounds: Normal breath sounds. No wheezing.   Abdominal:      General: Bowel sounds are normal. There is no distension.      Palpations: Abdomen is soft. There is no mass.      Tenderness: There is no abdominal tenderness. There is no guarding or rebound.   Musculoskeletal:         General: No swelling or tenderness. Normal range of motion.      Cervical back: Normal range of motion and neck supple. No tenderness.      Comments: She is ambulatory without problems.  Non tender jaws with full range of motion noted.   Lymphadenopathy:      Cervical: No cervical adenopathy.   Skin:     General: Skin is warm.   Neurological:      General: No focal deficit present.      Mental Status: She is alert and oriented to person, place, and time.   Psychiatric:         Mood and Affect: Mood normal.         Behavior: Behavior normal.         Thought Content: Thought content normal.         Judgment: Judgment normal.         ASSESSMENT:  1. ERROL (generalized anxiety disorder)    2. Bruxism (teeth grinding)    3. Jaw pain    4. Obesity (BMI 30.0-34.9)        PLAN:  Smith was seen today for panic attack.    Diagnoses and all orders for this visit:    ERROL (generalized anxiety disorder)    Bruxism (teeth grinding)    Jaw pain    Obesity (BMI 30.0-34.9)      No labs today.  Continue current medications (including take Buspar TID as directed), follow low sodium, low cholesterol, low carb diet, daily walks.  Take Ibuprofen 800 mg TID with food for 7 to 14 days.  Keep follow up with specialists (including see dentist for evaluation and may need nightguard).  Work excuse for today with return tomorrow.  Per her inquiry, okay to take over-the-counter magnesium for knee popping.  Follow up if symptoms worsen or fail to improve.    35  minutes of total time spent on the encounter, which includes face to face time and non-face to face time preparing to see the patient (eg, review of tests), Obtaining and/or reviewing separately obtained history, Documenting clinical information in the electronic or other health record, Independently interpreting results (not separately reported) and communicating results to the patient/family/caregiver, or Care coordination (not separately reported).      This note is  generated with speech recognition software and is subject to transcription error and sound alike phrases that may be missed by proofreading.

## 2024-03-19 ENCOUNTER — OFFICE VISIT (OUTPATIENT)
Dept: OPHTHALMOLOGY | Facility: CLINIC | Age: 34
End: 2024-03-19
Payer: COMMERCIAL

## 2024-03-19 DIAGNOSIS — H04.123 DRY EYES, BILATERAL: Primary | ICD-10-CM

## 2024-03-19 PROCEDURE — 1159F MED LIST DOCD IN RCRD: CPT | Mod: CPTII,S$GLB,, | Performed by: OPTOMETRIST

## 2024-03-19 PROCEDURE — 99999 PR PBB SHADOW E&M-EST. PATIENT-LVL III: CPT | Mod: PBBFAC,,, | Performed by: OPTOMETRIST

## 2024-03-19 PROCEDURE — 92002 INTRM OPH EXAM NEW PATIENT: CPT | Mod: S$GLB,,, | Performed by: OPTOMETRIST

## 2024-03-19 NOTE — PROGRESS NOTES
SUBJECTIVE  Smith Love is 33 y.o. female  Corrected distance visual acuity was 20/20 -1 in the right eye and 20/20 in the left eye.   Chief Complaint   Patient presents with    Dry Eye     Pt new to [facility] today. Pt states her eyes has been feeling tired and heavy. Pt states she's on the computer all day and not sure if her eyes are dry. Pt states she will schedule an appointment to come back to get her complete eye exam.           HPI     Dry Eye     Additional comments: Pt new to [facility] today. Pt states her eyes has been   feeling tired and heavy. Pt states she's on the computer all day and not   sure if her eyes are dry. Pt states she will schedule an appointment to   come back to get her complete eye exam.            Comments      1. HTN 2020  2. Great Grandmother DM losing eye sight            Last edited by Austin Stokes on 3/19/2024  8:28 AM.         Assessment /Plan :  1. Dry eyes, bilateral   Increase water intake, Worksheet given. Discussed Dry Eyes in detail including Artificial Tears, lubricants, and Omega 3 Fish Oils.     RTC for scheduled appointment

## 2024-03-27 ENCOUNTER — OFFICE VISIT (OUTPATIENT)
Dept: DERMATOLOGY | Facility: CLINIC | Age: 34
End: 2024-03-27
Payer: COMMERCIAL

## 2024-03-27 VITALS — BODY MASS INDEX: 32.17 KG/M2 | WEIGHT: 200.19 LBS | HEIGHT: 66 IN

## 2024-03-27 DIAGNOSIS — L21.9 SEBORRHEIC DERMATITIS: Primary | ICD-10-CM

## 2024-03-27 PROCEDURE — 99204 OFFICE O/P NEW MOD 45 MIN: CPT | Mod: S$GLB,,, | Performed by: STUDENT IN AN ORGANIZED HEALTH CARE EDUCATION/TRAINING PROGRAM

## 2024-03-27 PROCEDURE — 99999 PR PBB SHADOW E&M-EST. PATIENT-LVL III: CPT | Mod: PBBFAC,,, | Performed by: STUDENT IN AN ORGANIZED HEALTH CARE EDUCATION/TRAINING PROGRAM

## 2024-03-27 PROCEDURE — 1160F RVW MEDS BY RX/DR IN RCRD: CPT | Mod: CPTII,S$GLB,, | Performed by: STUDENT IN AN ORGANIZED HEALTH CARE EDUCATION/TRAINING PROGRAM

## 2024-03-27 PROCEDURE — 3008F BODY MASS INDEX DOCD: CPT | Mod: CPTII,S$GLB,, | Performed by: STUDENT IN AN ORGANIZED HEALTH CARE EDUCATION/TRAINING PROGRAM

## 2024-03-27 PROCEDURE — G2211 COMPLEX E/M VISIT ADD ON: HCPCS | Mod: S$GLB,,, | Performed by: STUDENT IN AN ORGANIZED HEALTH CARE EDUCATION/TRAINING PROGRAM

## 2024-03-27 PROCEDURE — 1159F MED LIST DOCD IN RCRD: CPT | Mod: CPTII,S$GLB,, | Performed by: STUDENT IN AN ORGANIZED HEALTH CARE EDUCATION/TRAINING PROGRAM

## 2024-03-27 RX ORDER — CLOBETASOL PROPIONATE 0.46 MG/ML
SOLUTION TOPICAL DAILY
Qty: 50 ML | Refills: 3 | Status: SHIPPED | OUTPATIENT
Start: 2024-03-27

## 2024-03-27 RX ORDER — FLUCONAZOLE 200 MG/1
200 TABLET ORAL WEEKLY
Qty: 4 TABLET | Refills: 0 | Status: SHIPPED | OUTPATIENT
Start: 2024-03-27 | End: 2024-04-26

## 2024-03-27 RX ORDER — KETOCONAZOLE 20 MG/ML
SHAMPOO, SUSPENSION TOPICAL
Qty: 120 ML | Refills: 6 | Status: SHIPPED | OUTPATIENT
Start: 2024-03-28

## 2024-03-27 RX ORDER — MOMETASONE FUROATE 1 MG/G
CREAM TOPICAL DAILY
Qty: 45 G | Refills: 1 | Status: SHIPPED | OUTPATIENT
Start: 2024-03-27

## 2024-03-27 NOTE — PROGRESS NOTES
Subjective:       Patient ID:  Smith Love is a 33 y.o. female who presents for   Chief Complaint   Patient presents with    Hair/Scalp Problem    Dry Skin     History of Present Illness: The patient presents with chief complaint of persistent rash and flaking.  Location: throughout the scalp, eyebrows, ears, around the nose and discoloration on the forehead  Duration: off and on for years, recently has been persistent  Signs/Symptoms: thick flaking, scaling, itching dandruff and discoloration  Prior treatments: has used ketoconazole shampoo in the past with good results. Currently using tea tree oil and coconut oil.       Hair/Scalp Problem    Dry Skin        Review of Systems   Constitutional:  Negative for fever and chills.   Skin:  Positive for rash and dry skin.        Objective:    Physical Exam   Constitutional: She appears well-developed and well-nourished. No distress.   Neurological: She is alert and oriented to person, place, and time. She is not disoriented.   Psychiatric: She has a normal mood and affect.   Skin:   Areas Examined (abnormalities noted in diagram):   Scalp / Hair Palpated and Inspected  Head / Face Inspection Performed  Neck Inspection Performed              Diagram Legend     Erythematous scaling macule/papule c/w actinic keratosis       Vascular papule c/w angioma      Pigmented verrucoid papule/plaque c/w seborrheic keratosis      Yellow umbilicated papule c/w sebaceous hyperplasia      Irregularly shaped tan macule c/w lentigo     1-2 mm smooth white papules consistent with Milia      Movable subcutaneous cyst with punctum c/w epidermal inclusion cyst      Subcutaneous movable cyst c/w pilar cyst      Firm pink to brown papule c/w dermatofibroma      Pedunculated fleshy papule(s) c/w skin tag(s)      Evenly pigmented macule c/w junctional nevus     Mildly variegated pigmented, slightly irregular-bordered macule c/w mildly atypical nevus      Flesh colored to evenly  pigmented papule c/w intradermal nevus       Pink pearly papule/plaque c/w basal cell carcinoma      Erythematous hyperkeratotic cursted plaque c/w SCC      Surgical scar with no sign of skin cancer recurrence      Open and closed comedones      Inflammatory papules and pustules      Verrucoid papule consistent consistent with wart     Erythematous eczematous patches and plaques     Dystrophic onycholytic nail with subungual debris c/w onychomycosis     Umbilicated papule    Erythematous-base heme-crusted tan verrucoid plaque consistent with inflamed seborrheic keratosis     Erythematous Silvery Scaling Plaque c/w Psoriasis     See annotation      Assessment / Plan:        Seborrheic dermatitis  -     ketoconazole (NIZORAL) 2 % shampoo; Apply topically twice a week.  Dispense: 120 mL; Refill: 6  -     fluconazole (DIFLUCAN) 200 MG Tab; Take 1 tablet (200 mg total) by mouth once a week.  Dispense: 4 tablet; Refill: 0  -     mometasone 0.1% (ELOCON) 0.1 % cream; Apply topically once daily.  Dispense: 45 g; Refill: 1  -     clobetasoL (TEMOVATE) 0.05 % external solution; Apply topically once daily. Apply to the scalp  Dispense: 50 mL; Refill: 3             Follow up in about 3 months (around 6/27/2024).

## 2024-03-29 NOTE — TELEPHONE ENCOUNTER
No care due was identified.  [facility] Embedded Care Due Messages. Reference number: [id number].   3/29/2024 8:06:50 AM CDT

## 2024-03-31 RX ORDER — AMLODIPINE BESYLATE 5 MG/1
5 TABLET ORAL DAILY
Qty: 90 TABLET | Refills: 1 | Status: SHIPPED | OUTPATIENT
Start: 2024-03-31

## 2024-03-31 NOTE — TELEPHONE ENCOUNTER
Refill Routing Note   Medication(s) are not appropriate for processing by [facility] for the following reason(s):        New or recently adjusted medication: dose increased to 7.5 mg QD 1/5/24    [facility] action(s):  Defer               Appointments  past 12m or future 3m with PCP    Date Provider   Last Visit   3/5/2024 Caro Hernandez MD   Next Visit   8/22/2024 Caro Hernandez MD   ED visits in past 90 days: 0        Note composed:10:39 PM 03/30/2024

## 2024-05-21 ENCOUNTER — OFFICE VISIT (OUTPATIENT)
Dept: URGENT CARE | Facility: CLINIC | Age: 34
End: 2024-05-21
Payer: COMMERCIAL

## 2024-05-21 VITALS
RESPIRATION RATE: 18 BRPM | BODY MASS INDEX: 32.14 KG/M2 | DIASTOLIC BLOOD PRESSURE: 90 MMHG | HEIGHT: 66 IN | HEART RATE: 91 BPM | OXYGEN SATURATION: 99 % | SYSTOLIC BLOOD PRESSURE: 120 MMHG | WEIGHT: 200 LBS | TEMPERATURE: 98 F

## 2024-05-21 DIAGNOSIS — K59.00 CONSTIPATION, UNSPECIFIED CONSTIPATION TYPE: ICD-10-CM

## 2024-05-21 DIAGNOSIS — R10.9 ABDOMINAL PAIN, UNSPECIFIED ABDOMINAL LOCATION: Primary | ICD-10-CM

## 2024-05-21 LAB
B-HCG UR QL: NEGATIVE
BILIRUB UR QL STRIP: NEGATIVE
CLARITY UR: CLEAR
COLOR UR: NORMAL
CTP QC/QA: YES
GLUCOSE UR QL STRIP: NEGATIVE
KETONES UR QL STRIP: NEGATIVE
LEUKOCYTE ESTERASE UR QL STRIP: NEGATIVE
PH, POC UA: 5
POC BLOOD, URINE: NEGATIVE
POC NITRATES, URINE: NEGATIVE
PROT UR QL STRIP: NEGATIVE
SP GR UR STRIP: 1 (ref 1–1.03)
UROBILINOGEN UR STRIP-ACNC: NORMAL (ref 0.1–1.1)

## 2024-05-21 PROCEDURE — 81025 URINE PREGNANCY TEST: CPT | Mod: S$GLB,,, | Performed by: FAMILY MEDICINE

## 2024-05-21 PROCEDURE — 99213 OFFICE O/P EST LOW 20 MIN: CPT | Mod: S$GLB,,, | Performed by: FAMILY MEDICINE

## 2024-05-21 PROCEDURE — 74018 RADEX ABDOMEN 1 VIEW: CPT | Mod: S$GLB,,, | Performed by: RADIOLOGY

## 2024-05-21 PROCEDURE — 81003 URINALYSIS AUTO W/O SCOPE: CPT | Mod: QW,S$GLB,, | Performed by: FAMILY MEDICINE

## 2024-05-21 NOTE — PATIENT INSTRUCTIONS
Thank you for allowing our team to take care of you today.  Your diagnosis is abdominal pain and constipation.  Attached is a general handout on constipation.  Continue to stay hydrated drinking water as you do already.  Make sure you are getting fiber in the diet. You can add metamucil supplement daily.  For now, also add Miralax daily. Followup instructions on the container. Take until followup with primary care provider.   Continue your management and testing as appropriate with them.  Continue weight loss as you have been doing which will help your blood sugar.   If any worsening symptoms before seeing them, get a medical provider evaluation. Followup here as needed.

## 2024-05-21 NOTE — PROGRESS NOTES
"Subjective:      Patient ID: Smith Love is a 33 y.o. female.    Vitals:  height is 5' 6" (1.676 m) and weight is 90.7 kg (200 lb). Her oral temperature is 97.9 °F (36.6 °C). Her blood pressure is 120/90 (abnormal) and her pulse is 91. Her respiration is 18 and oxygen saturation is 99%.     Chief Complaint: Abdominal Pain    32 yo female presents to clinic with abdominal pain, onset yesterday. Patient states that she is feeling it a little bit in her back also. She has some gassiness and belching. She had a large bm today and felt a big relief. Only a little soreness remains. Anxious person. Thinking maybe she has irritable bowel. Just came off period and it was normal. No vaginal symptoms. No nausea or vomiting or diarrhea. No blood in the stool. No fever or chills. Just had pap end of 2023 and normal except hpv. Labs good except hba1c 8. Losing weight. Changed to high protein diet.     Abdominal Pain  This is a new problem. The current episode started yesterday. The pain is located in the generalized abdominal region. The quality of the pain is cramping. The abdominal pain radiates to the back. Associated symptoms include constipation. Pertinent negatives include no nausea or vomiting. The pain is relieved by Bowel movements.       Constitution: Negative.   HENT: Negative.     Cardiovascular: Negative.    Respiratory: Negative.     Gastrointestinal:  Positive for abdominal pain and constipation. Negative for abdominal trauma, nausea and vomiting.   Genitourinary: Negative.    Musculoskeletal: Negative.    Skin: Negative.    Neurological: Negative.    Psychiatric/Behavioral:  Positive for nervous/anxious. The patient is nervous/anxious.       Objective:     Physical Exam   Constitutional: She is oriented to person, place, and time. No distress.   HENT:   Ears:   Right Ear: Tympanic membrane, external ear and ear canal normal.   Left Ear: Tympanic membrane, external ear and ear canal normal.   Nose: Nose " normal.   Mouth/Throat: Mucous membranes are moist. Oropharynx is clear.   Eyes: Conjunctivae are normal. Pupils are equal, round, and reactive to light.   Neck: Neck supple.   Cardiovascular: Normal rate, regular rhythm, normal heart sounds and normal pulses.   Pulmonary/Chest: Effort normal and breath sounds normal.   Abdominal: Normal appearance and bowel sounds are normal. She exhibits no distension and no mass. Soft. There is no abdominal tenderness. There is no left CVA tenderness and no right CVA tenderness. No hernia.   Musculoskeletal:         General: No swelling.   Neurological: no focal deficit. She is alert and oriented to person, place, and time.   Skin: Skin is warm.   Psychiatric: Her behavior is normal. Mood, judgment and thought content normal.   Nursing note and vitals reviewed.      Assessment:     1. Abdominal pain, unspecified abdominal location    2. Constipation, unspecified constipation type        Plan:       Abdominal pain, unspecified abdominal location  -     POCT Urinalysis, Dipstick, Automated, W/O Scope  -     POCT urine pregnancy  -     X-Ray Abdomen AP 1 View; Future; Expected date: 05/21/2024    Constipation, unspecified constipation type      Results for orders placed or performed in visit on 05/21/24   POCT Urinalysis, Dipstick, Automated, W/O Scope   Result Value Ref Range    POC Blood, Urine Negative Negative    POC Bilirubin, Urine Negative Negative    POC Urobilinogen, Urine norm 0.1 - 1.1    POC Ketones, Urine Negative Negative    POC Protein, Urine Negative Negative    POC Nitrates, Urine Negative Negative    POC Glucose, Urine Negative Negative    pH, UA 5.0     POC Specific Gravity, Urine 1.005 1.003 - 1.029    POC Leukocytes, Urine Negative Negative    Color, UA Light Yellow Light Yellow, Yellow    Clarity, UA Clear Clear   POCT urine pregnancy   Result Value Ref Range    POC Preg Test, Ur Negative Negative     Acceptable Yes      Review of patient's  allergies indicates:  No Known Allergies      SUMMARY: See hpi. UA and UPT negative. Xray shows stool and nonspecific intestinal gas on the right. Clinically constipation is her current source. Possibly ibs. She also mentioned hba1c elevated but she is working to decrease this. Followup with pcp. Followup here as needed. Handout on Constipation given.     Patient Instructions   Thank you for allowing our team to take care of you today.  Your diagnosis is abdominal pain and constipation.  Attached is a general handout on constipation.  Continue to stay hydrated drinking water as you do already.  Make sure you are getting fiber in the diet. You can add metamucil supplement daily.  For now, also add Miralax daily. Followup instructions on the container. Take until followup with primary care provider.   Continue your management and testing as appropriate with them.  Continue weight loss as you have been doing which will help your blood sugar.   If any worsening symptoms before seeing them, get a medical provider evaluation. Followup here as needed.

## 2024-06-28 DIAGNOSIS — I10 HYPERTENSION, UNSPECIFIED TYPE: ICD-10-CM

## 2024-06-28 RX ORDER — AMLODIPINE BESYLATE 2.5 MG/1
2.5 TABLET ORAL
Qty: 90 TABLET | Refills: 0 | Status: SHIPPED | OUTPATIENT
Start: 2024-06-28

## 2024-06-28 NOTE — TELEPHONE ENCOUNTER
No care due was identified.  [facility] Embedded Care Due Messages. Reference number: [id number].   6/28/2024 7:02:33 AM CDT

## 2024-07-02 RX ORDER — HYDROCHLOROTHIAZIDE 25 MG/1
25 TABLET ORAL DAILY
Qty: 30 TABLET | Refills: 11 | Status: SHIPPED | OUTPATIENT
Start: 2024-07-02

## 2024-07-03 DIAGNOSIS — J45.909 NOT WELL CONTROLLED ASTHMA WITHOUT COMPLICATION: ICD-10-CM

## 2024-07-03 RX ORDER — FLUTICASONE PROPIONATE AND SALMETEROL 250; 50 UG/1; UG/1
1 POWDER RESPIRATORY (INHALATION) 2 TIMES DAILY
Qty: 180 EACH | Refills: 5 | Status: SHIPPED | OUTPATIENT
Start: 2024-07-03 | End: 2025-07-03

## 2024-07-03 NOTE — TELEPHONE ENCOUNTER
Refill Routing Note   Medication(s) are not appropriate for processing by [facility] for the following reason(s):        No active prescription written by provider    [facility] action(s):  Defer               Appointments  past 12m or future 3m with PCP    Date Provider   Last Visit   3/5/2024 Caro Hernandez MD   Next Visit   8/22/2024 Caro Hernandez MD   ED visits in past 90 days: 0        Note composed:12:35 PM 07/03/2024

## 2024-07-08 ENCOUNTER — OFFICE VISIT (OUTPATIENT)
Dept: FAMILY MEDICINE | Facility: CLINIC | Age: 34
End: 2024-07-08
Attending: FAMILY MEDICINE
Payer: COMMERCIAL

## 2024-07-08 VITALS
TEMPERATURE: 96 F | HEART RATE: 95 BPM | RESPIRATION RATE: 18 BRPM | DIASTOLIC BLOOD PRESSURE: 76 MMHG | SYSTOLIC BLOOD PRESSURE: 124 MMHG | BODY MASS INDEX: 31.5 KG/M2 | WEIGHT: 196 LBS | HEIGHT: 66 IN

## 2024-07-08 DIAGNOSIS — E66.9 OBESITY (BMI 30.0-34.9): ICD-10-CM

## 2024-07-08 DIAGNOSIS — J45.901 MILD ASTHMA WITH ACUTE EXACERBATION, UNSPECIFIED WHETHER PERSISTENT: ICD-10-CM

## 2024-07-08 DIAGNOSIS — J30.2 SEASONAL ALLERGIC RHINITIS, UNSPECIFIED TRIGGER: ICD-10-CM

## 2024-07-08 PROCEDURE — 99214 OFFICE O/P EST MOD 30 MIN: CPT | Mod: S$GLB,,, | Performed by: FAMILY MEDICINE

## 2024-07-08 PROCEDURE — 99999 PR PBB SHADOW E&M-EST. PATIENT-LVL V: CPT | Mod: PBBFAC,,, | Performed by: FAMILY MEDICINE

## 2024-07-08 PROCEDURE — 1160F RVW MEDS BY RX/DR IN RCRD: CPT | Mod: CPTII,S$GLB,, | Performed by: FAMILY MEDICINE

## 2024-07-08 PROCEDURE — G2211 COMPLEX E/M VISIT ADD ON: HCPCS | Mod: S$GLB,,, | Performed by: FAMILY MEDICINE

## 2024-07-08 PROCEDURE — 3008F BODY MASS INDEX DOCD: CPT | Mod: CPTII,S$GLB,, | Performed by: FAMILY MEDICINE

## 2024-07-08 PROCEDURE — 3074F SYST BP LT 130 MM HG: CPT | Mod: CPTII,S$GLB,, | Performed by: FAMILY MEDICINE

## 2024-07-08 PROCEDURE — 3078F DIAST BP <80 MM HG: CPT | Mod: CPTII,S$GLB,, | Performed by: FAMILY MEDICINE

## 2024-07-08 PROCEDURE — 1159F MED LIST DOCD IN RCRD: CPT | Mod: CPTII,S$GLB,, | Performed by: FAMILY MEDICINE

## 2024-07-08 RX ORDER — METHYLPREDNISOLONE 4 MG/1
TABLET ORAL
Qty: 1 EACH | Refills: 0 | Status: SHIPPED | OUTPATIENT
Start: 2024-07-08

## 2024-08-06 ENCOUNTER — PATIENT MESSAGE (OUTPATIENT)
Dept: FAMILY MEDICINE | Facility: CLINIC | Age: 34
End: 2024-08-06
Payer: COMMERCIAL

## 2024-08-06 ENCOUNTER — TELEPHONE (OUTPATIENT)
Dept: FAMILY MEDICINE | Facility: CLINIC | Age: 34
End: 2024-08-06
Payer: COMMERCIAL

## 2024-08-06 ENCOUNTER — OFFICE VISIT (OUTPATIENT)
Dept: URGENT CARE | Facility: CLINIC | Age: 34
End: 2024-08-06
Payer: COMMERCIAL

## 2024-08-06 VITALS
HEIGHT: 66 IN | WEIGHT: 191 LBS | TEMPERATURE: 99 F | SYSTOLIC BLOOD PRESSURE: 121 MMHG | RESPIRATION RATE: 20 BRPM | OXYGEN SATURATION: 98 % | BODY MASS INDEX: 30.7 KG/M2 | HEART RATE: 108 BPM | DIASTOLIC BLOOD PRESSURE: 84 MMHG

## 2024-08-06 DIAGNOSIS — R09.81 NASAL CONGESTION: ICD-10-CM

## 2024-08-06 DIAGNOSIS — J45.909 ASTHMA, UNSPECIFIED ASTHMA SEVERITY, UNSPECIFIED WHETHER COMPLICATED, UNSPECIFIED WHETHER PERSISTENT: ICD-10-CM

## 2024-08-06 DIAGNOSIS — U07.1 COVID-19 VIRUS DETECTED: ICD-10-CM

## 2024-08-06 DIAGNOSIS — U07.1 COVID-19: Primary | ICD-10-CM

## 2024-08-06 LAB
CTP QC/QA: YES
CTP QC/QA: YES
POC MOLECULAR INFLUENZA A AGN: NEGATIVE
POC MOLECULAR INFLUENZA B AGN: NEGATIVE
SARS-COV-2 AG RESP QL IA.RAPID: POSITIVE

## 2024-08-06 PROCEDURE — 87811 SARS-COV-2 COVID19 W/OPTIC: CPT | Mod: QW,,, | Performed by: FAMILY MEDICINE

## 2024-08-06 PROCEDURE — 99214 OFFICE O/P EST MOD 30 MIN: CPT | Mod: ,,, | Performed by: FAMILY MEDICINE

## 2024-08-06 PROCEDURE — 87502 INFLUENZA DNA AMP PROBE: CPT | Mod: QW,,, | Performed by: FAMILY MEDICINE

## 2024-08-06 RX ORDER — ALBUTEROL SULFATE 0.83 MG/ML
2.5 SOLUTION RESPIRATORY (INHALATION) EVERY 6 HOURS PRN
Qty: 50 EACH | Refills: 0 | Status: SHIPPED | OUTPATIENT
Start: 2024-08-06

## 2024-08-06 RX ORDER — FLUTICASONE FUROATE, UMECLIDINIUM BROMIDE AND VILANTEROL TRIFENATATE 200; 62.5; 25 UG/1; UG/1; UG/1
1 POWDER RESPIRATORY (INHALATION)
COMMUNITY
Start: 2024-07-18 | End: 2025-01-13

## 2024-08-06 RX ORDER — CETIRIZINE HYDROCHLORIDE 10 MG/1
TABLET ORAL
COMMUNITY
Start: 2024-07-18 | End: 2025-07-13

## 2024-08-06 RX ORDER — ACETAMINOPHEN 500 MG
1000 TABLET ORAL
Status: COMPLETED | OUTPATIENT
Start: 2024-08-06 | End: 2024-08-06

## 2024-08-06 RX ORDER — METHYLPREDNISOLONE 4 MG/1
TABLET ORAL
Qty: 21 EACH | Refills: 0 | Status: SHIPPED | OUTPATIENT
Start: 2024-08-06 | End: 2024-08-27

## 2024-08-06 RX ADMIN — Medication 1000 MG: at 08:08

## 2024-09-26 DIAGNOSIS — I10 HYPERTENSION, UNSPECIFIED TYPE: ICD-10-CM

## 2024-09-26 RX ORDER — AMLODIPINE BESYLATE 2.5 MG/1
2.5 TABLET ORAL
Qty: 90 TABLET | Refills: 3 | Status: SHIPPED | OUTPATIENT
Start: 2024-09-26

## 2024-09-26 NOTE — TELEPHONE ENCOUNTER
No care due was identified.  [facility] Embedded Care Due Messages. Reference number: [id number].   9/26/2024 7:03:39 AM CDT

## 2024-09-26 NOTE — TELEPHONE ENCOUNTER
Refill Decision Note   Johannebridger Love  is requesting a refill authorization.  Brief Assessment and Rationale for Refill:  Approve     Medication Therapy Plan:         Alert overridden per protocol: Yes   Comments:     Note composed:2:43 PM 09/26/2024

## 2024-11-05 ENCOUNTER — PATIENT MESSAGE (OUTPATIENT)
Dept: RESEARCH | Facility: HOSPITAL | Age: 34
End: 2024-11-05
Payer: COMMERCIAL

## 2024-11-08 RX ORDER — AMLODIPINE BESYLATE 5 MG/1
TABLET ORAL
Qty: 90 TABLET | Refills: 2 | Status: SHIPPED | OUTPATIENT
Start: 2024-11-08

## 2024-11-08 NOTE — TELEPHONE ENCOUNTER
Refill Decision Note   Johannebridger Ivan  is requesting a refill authorization.  Brief Assessment and Rationale for Refill:  Approve     Medication Therapy Plan: PER EPIC DATA PT IS TAKING 7.5 MG DAILY      Alert overridden per protocol: Yes   Comments:     Note composed:8:28 AM 11/08/2024

## 2024-11-29 ENCOUNTER — OFFICE VISIT (OUTPATIENT)
Dept: URGENT CARE | Facility: CLINIC | Age: 34
End: 2024-11-29
Payer: COMMERCIAL

## 2024-11-29 VITALS
TEMPERATURE: 98 F | BODY MASS INDEX: 32.76 KG/M2 | OXYGEN SATURATION: 98 % | RESPIRATION RATE: 17 BRPM | HEART RATE: 83 BPM | DIASTOLIC BLOOD PRESSURE: 87 MMHG | WEIGHT: 203.81 LBS | SYSTOLIC BLOOD PRESSURE: 118 MMHG | HEIGHT: 66 IN

## 2024-11-29 DIAGNOSIS — M79.641 PAIN IN RIGHT HAND: ICD-10-CM

## 2024-11-29 DIAGNOSIS — S62.396A OTHER FRACTURE OF FIFTH METACARPAL BONE, RIGHT HAND, INITIAL ENCOUNTER FOR CLOSED FRACTURE: Primary | ICD-10-CM

## 2024-11-29 NOTE — LETTER
November 29, 2024      [facility]  [address] , [address]  [place] [address]  Phone: [phone]  Fax: [phone]       Patient: Smith Love   YOB: 1990  Date of Visit: 11/29/2024    To Whom It May Concern:    Camila Love  was at [facility] on 11/29/2024. The patient may return to work on 11/30/2024 with typing restrictions of right hand. If you have any questions or concerns, or if I can be of further assistance, please do not hesitate to contact me.    Sincerely,          Lynette Donovan PA-C
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
## 2024-11-29 NOTE — PATIENT INSTRUCTIONS
Discharge Instructions:    Rest, apply ice intermittently, compress with ace wrap, and elevate above heart level as much as possible.  Do not apply ice directly to skin. Wrap ice in cloth before applying.      OTC Tylenol (acetaminophen) up to 4,000 mg a day is safe for short periods and can be used for pain, and fever. However in high doses and prolonged use it can cause liver irritation.    OTC Ibuprofen (NSAID) is a non-steroidal anti-inflammatory that can be used for pain. However it can also cause stomach irritation if over used.    Of note: Aleve, Motrin, Advil, Mobic, meloxicam, and indomethacin are also other names of NSAIDs.    You will need to follow-up with orthopedics if your symptoms persist, as we discussed.    AMBULATORY REFERRAL ORDERED:   Someone will call with an appointment this week.  Please answer all unknown calls.  If you are not contacted within 7 days, Please call clinic  for status of appointment. ([phone])

## 2024-12-02 ENCOUNTER — OFFICE VISIT (OUTPATIENT)
Dept: SPORTS MEDICINE | Facility: CLINIC | Age: 34
End: 2024-12-02
Payer: COMMERCIAL

## 2024-12-02 VITALS — WEIGHT: 203.69 LBS | BODY MASS INDEX: 32.73 KG/M2 | HEIGHT: 66 IN

## 2024-12-02 DIAGNOSIS — S62.366A CLOSED NONDISPLACED FRACTURE OF NECK OF FIFTH METACARPAL BONE OF RIGHT HAND, INITIAL ENCOUNTER: Primary | ICD-10-CM

## 2024-12-02 PROCEDURE — 1159F MED LIST DOCD IN RCRD: CPT | Mod: CPTII,S$GLB,, | Performed by: STUDENT IN AN ORGANIZED HEALTH CARE EDUCATION/TRAINING PROGRAM

## 2024-12-02 PROCEDURE — 99999 PR PBB SHADOW E&M-EST. PATIENT-LVL III: CPT | Mod: PBBFAC,,, | Performed by: STUDENT IN AN ORGANIZED HEALTH CARE EDUCATION/TRAINING PROGRAM

## 2024-12-02 PROCEDURE — 26600 TREAT METACARPAL FRACTURE: CPT | Mod: RT,S$GLB,, | Performed by: STUDENT IN AN ORGANIZED HEALTH CARE EDUCATION/TRAINING PROGRAM

## 2024-12-02 PROCEDURE — 99203 OFFICE O/P NEW LOW 30 MIN: CPT | Mod: 57,S$GLB,, | Performed by: STUDENT IN AN ORGANIZED HEALTH CARE EDUCATION/TRAINING PROGRAM

## 2024-12-02 PROCEDURE — 3008F BODY MASS INDEX DOCD: CPT | Mod: CPTII,S$GLB,, | Performed by: STUDENT IN AN ORGANIZED HEALTH CARE EDUCATION/TRAINING PROGRAM

## 2024-12-02 NOTE — LETTER
Patient: Smith Love   YOB: 1990   Clinic Number: [id number]   Today's Date: December 2, 2024        Certificate to Return to Work     Smith Julian was seen by Robert Mari MD on 12/2/2024.    Smith Julian has a fracture of her hand and will need to be casted for at least 4 weeks. She is able to work as she can tolerate while in this cast, but may need accommodations such as voice to text, decreased typing and/or more time on tasks.     Please excuse her from work missed due to the office visit.     If you have any questions or concerns, please feel free to contact the office at [phone].    Thank you.    Robert Mari MD        Signature
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
## 2024-12-18 NOTE — PROGRESS NOTES
Smith Love  MRN:  [id number]  34 y.o. female      SUBJECTIVE:     CHIEF COMPLAINT:  - Nausea    HPI:  Ms. Love reports nausea, primarily in the mornings, starting approximately 1 month ago, initially coinciding with her menstrual cycle. She also had post-nasal drip, sneezing, migraines, and thick yellow mucus when expectorating. Ms. Love mentions chest pain and feeling stressed about these symptoms, causing her to hyperventilate.    Ms. Love's most recent A1C test in November 2023 showed a result of 8.2%, significantly higher than her previous results of 4-5%. She mentions a history of steroid use for asthma management, which may have contributed to the elevated A1C. Ms. Love is distressed about the diabetes diagnosis and she admits has avoided any further lab or follow-up.    Since her last A1C, she reports making lifestyle changes, including adhering to a strict diet, exercising daily, and joining [employer] classes. She notes recent weight gain. Her typical eating pattern involves dinner around 6 PM followed by fasting until noon the next day, potentially contributing to her morning nausea.      Review of Systems   Constitutional:  Positive for malaise/fatigue. Negative for weight loss.   Respiratory: Negative.     Cardiovascular: Negative.    Gastrointestinal:  Positive for nausea. Negative for abdominal pain, blood in stool, constipation, diarrhea, heartburn, melena and vomiting.   Genitourinary: Negative.    Skin: Negative.    Neurological: Negative.    Psychiatric/Behavioral:  Negative for depression, hallucinations, memory loss, substance abuse and suicidal ideas. The patient is nervous/anxious (upcoming wedding, planning). The patient does not have insomnia.        Review of patient's allergies indicates:  No Known Allergies      Patient Active Problem List   Diagnosis    Asthma, mild intermittent    Hypertension    Seasonal allergic rhinitis due to pollen    Anxiety    EKG abnormalities     "Bradycardia    HPV in female    LGSIL on Pap smear of cervix    Obesity (BMI 30.0-34.9)       Current Outpatient Medications   Medication Instructions    albuterol (PROVENTIL) 2.5 mg, Nebulization, Every 6 hours PRN, Rescue    albuterol (VENTOLIN HFA) 90 mcg/actuation inhaler 2 puffs, Inhalation, Every 6 hours PRN, Inhaler    amLODIPine (NORVASC) 5 MG tablet TAKE 1 TABLET BY MOUTH DAILY TAKE WITH 2.5MG FOR TOTAL 7.5 MG DAILY    amLODIPine (NORVASC) 2.5 mg, Oral    azelastine (ASTELIN) 137 mcg, Nasal, 2 times daily    blood sugar diagnostic Strp 1 each, Misc.(Non-Drug; Combo Route), 3 times daily    blood-glucose meter (BLOOD GLUCOSE MONITORING) kit Use as instructed    busPIRone (BUSPAR) 5 mg, 3 times daily    diclofenac sodium (VOLTAREN) 2 g, Topical (Top), 4 times daily    fluticasone-salmeterol diskus inhaler 250-50 mcg 1 puff, Inhalation, 2 times daily, Controller    hydroCHLOROthiazide (HYDRODIURIL) 25 mg, Oral, Daily    levocetirizine (XYZAL) 5 mg, Oral, Nightly    montelukast (SINGULAIR) 10 mg, Oral, Nightly         Past medical, surgical, family and social histories have been reviewed today.      OBJECTIVE:     Vitals:    12/19/24 1023   BP: 118/80   BP Location: Right arm   Pulse: 97   Temp: 96.5 °F (35.8 °C)   TempSrc: Tympanic   SpO2: 99%   Weight: 97.7 kg (215 lb 6.2 oz)   Height: 5' 6" (1.676 m)     Vitals:    12/19/24 1023   PainSc: 0-No pain     Wt Readings from Last 6 Encounters:   12/19/24 97.7 kg (215 lb 6.2 oz)   12/19/24 92.2 kg (203 lb 4.2 oz)   12/02/24 92.4 kg (203 lb 11.3 oz)   11/29/24 92.4 kg (203 lb 12.8 oz)   08/06/24 86.6 kg (191 lb)   07/08/24 88.9 kg (195 lb 15.8 oz)       Physical Exam  Vitals reviewed.   Constitutional:       General: She is not in acute distress.  HENT:      Head: Normocephalic and atraumatic.   Neurological:      Mental Status: She is alert and oriented to person, place, and time.      Motor: No weakness.      Gait: Gait normal.   Psychiatric:         Attention and " "Perception: She is attentive. She does not perceive auditory or visual hallucinations.         Mood and Affect: Mood is anxious. Affect is flat.         Speech: Speech normal.         Behavior: Behavior is agitated. Behavior is not slowed or withdrawn.         Thought Content: Thought content normal.         Cognition and Memory: Cognition is not impaired. Memory is not impaired.         ASSESSMENT:     1. Type 2 diabetes mellitus without complication, without long-term current use of insulin --- chronic issue, current control unknown due to no home bs checks and A1C out of date.  Check lab.  -     Hemoglobin A1C; Future; Expected date: 12/19/2024  -     Basic Metabolic Panel; Future; Expected date: 12/19/2024  -     Microalbumin/Creatinine Ratio, Urine; Future; Expected date: 12/19/2024    No results found for: "MICALBCREAT"    Lab Results   Component Value Date    HGBA1C 8.2 (H) 11/09/2023     Lab Results   Component Value Date     11/17/2023    K 3.5 11/17/2023     11/17/2023    CO2 24 11/17/2023    BUN 7 11/17/2023    CREATININE 0.9 11/17/2023    CALCIUM 9.8 11/17/2023    ANIONGAP 12 11/17/2023    EGFRNORACEVR >60 11/17/2023     2. Anxiety ---- to CBT if needed ----- triggering nausea ?    3. Nausea ---- possibly triggered by # 1 or 2, monitor      PLAN:     Further tx and rec pending lab.  RTC as directed and/or prn.      BERNABE Stanton  [facility]       30 minutes of total time spent on the encounter, which includes face to face time and non-face to face time preparing to see the patient.  This includes obtaining and/or reviewing separately obtained history, performing a medically appropriate examination and/or evaluation, and counseling and educating the patient/family/caregiver.  Includes documenting clinical information in the electronic or other health record, independently interpreting results (not separately reported) and communicating results to the " patient/family/caregiver, with care coordination (not separately reported).  Medications, tests and/or procedures ordered as necessary along with referring and communicating with other health professionals (when not separately reported).

## 2024-12-19 ENCOUNTER — LAB VISIT (OUTPATIENT)
Dept: LAB | Facility: HOSPITAL | Age: 34
End: 2024-12-19
Attending: REGISTERED NURSE
Payer: COMMERCIAL

## 2024-12-19 ENCOUNTER — OFFICE VISIT (OUTPATIENT)
Dept: FAMILY MEDICINE | Facility: CLINIC | Age: 34
End: 2024-12-19
Payer: COMMERCIAL

## 2024-12-19 ENCOUNTER — OFFICE VISIT (OUTPATIENT)
Dept: OTOLARYNGOLOGY | Facility: CLINIC | Age: 34
End: 2024-12-19
Payer: COMMERCIAL

## 2024-12-19 VITALS — BODY MASS INDEX: 32.66 KG/M2 | WEIGHT: 203.25 LBS | HEIGHT: 66 IN

## 2024-12-19 VITALS
HEIGHT: 66 IN | OXYGEN SATURATION: 99 % | SYSTOLIC BLOOD PRESSURE: 118 MMHG | HEART RATE: 97 BPM | DIASTOLIC BLOOD PRESSURE: 80 MMHG | BODY MASS INDEX: 34.61 KG/M2 | WEIGHT: 215.38 LBS | TEMPERATURE: 97 F

## 2024-12-19 DIAGNOSIS — J30.89 NON-SEASONAL ALLERGIC RHINITIS, UNSPECIFIED TRIGGER: Primary | ICD-10-CM

## 2024-12-19 DIAGNOSIS — E11.9 TYPE 2 DIABETES MELLITUS WITHOUT COMPLICATION, WITHOUT LONG-TERM CURRENT USE OF INSULIN: ICD-10-CM

## 2024-12-19 DIAGNOSIS — E11.9 TYPE 2 DIABETES MELLITUS WITHOUT COMPLICATION, WITHOUT LONG-TERM CURRENT USE OF INSULIN: Primary | ICD-10-CM

## 2024-12-19 DIAGNOSIS — J34.89 SINUS PRESSURE: ICD-10-CM

## 2024-12-19 DIAGNOSIS — J01.41 ACUTE RECURRENT PANSINUSITIS: ICD-10-CM

## 2024-12-19 DIAGNOSIS — F41.9 ANXIETY: ICD-10-CM

## 2024-12-19 DIAGNOSIS — R11.0 NAUSEA: ICD-10-CM

## 2024-12-19 LAB
ALBUMIN/CREAT UR: NORMAL UG/MG (ref 0–30)
CREAT UR-MCNC: 92 MG/DL (ref 15–325)
MICROALBUMIN UR DL<=1MG/L-MCNC: <5 UG/ML

## 2024-12-19 PROCEDURE — 3074F SYST BP LT 130 MM HG: CPT | Mod: CPTII,S$GLB,, | Performed by: REGISTERED NURSE

## 2024-12-19 PROCEDURE — 3008F BODY MASS INDEX DOCD: CPT | Mod: CPTII,S$GLB,, | Performed by: REGISTERED NURSE

## 2024-12-19 PROCEDURE — 3061F NEG MICROALBUMINURIA REV: CPT | Mod: CPTII,S$GLB,, | Performed by: REGISTERED NURSE

## 2024-12-19 PROCEDURE — 99999 PR PBB SHADOW E&M-EST. PATIENT-LVL III: CPT | Mod: PBBFAC,,, | Performed by: REGISTERED NURSE

## 2024-12-19 PROCEDURE — G2211 COMPLEX E/M VISIT ADD ON: HCPCS | Mod: S$GLB,,, | Performed by: REGISTERED NURSE

## 2024-12-19 PROCEDURE — 3052F HG A1C>EQUAL 8.0%<EQUAL 9.0%: CPT | Mod: CPTII,S$GLB,, | Performed by: REGISTERED NURSE

## 2024-12-19 PROCEDURE — 99999 PR PBB SHADOW E&M-EST. PATIENT-LVL III: CPT | Mod: PBBFAC,,, | Performed by: OTOLARYNGOLOGY

## 2024-12-19 PROCEDURE — 99214 OFFICE O/P EST MOD 30 MIN: CPT | Mod: S$GLB,,, | Performed by: REGISTERED NURSE

## 2024-12-19 PROCEDURE — 1159F MED LIST DOCD IN RCRD: CPT | Mod: CPTII,S$GLB,, | Performed by: OTOLARYNGOLOGY

## 2024-12-19 PROCEDURE — 3079F DIAST BP 80-89 MM HG: CPT | Mod: CPTII,S$GLB,, | Performed by: REGISTERED NURSE

## 2024-12-19 PROCEDURE — 3066F NEPHROPATHY DOC TX: CPT | Mod: CPTII,S$GLB,, | Performed by: REGISTERED NURSE

## 2024-12-19 PROCEDURE — 3008F BODY MASS INDEX DOCD: CPT | Mod: CPTII,S$GLB,, | Performed by: OTOLARYNGOLOGY

## 2024-12-19 PROCEDURE — 99214 OFFICE O/P EST MOD 30 MIN: CPT | Mod: S$GLB,,, | Performed by: OTOLARYNGOLOGY

## 2024-12-19 PROCEDURE — 82043 UR ALBUMIN QUANTITATIVE: CPT | Performed by: REGISTERED NURSE

## 2024-12-19 RX ORDER — AZELASTINE 1 MG/ML
1 SPRAY, METERED NASAL 2 TIMES DAILY
Qty: 30 ML | Refills: 2 | Status: SHIPPED | OUTPATIENT
Start: 2024-12-19

## 2024-12-19 RX ORDER — LEVOCETIRIZINE DIHYDROCHLORIDE 5 MG/1
5 TABLET, FILM COATED ORAL NIGHTLY
Qty: 30 TABLET | Refills: 11 | Status: SHIPPED | OUTPATIENT
Start: 2024-12-19 | End: 2025-12-19

## 2024-12-19 RX ORDER — MONTELUKAST SODIUM 10 MG/1
10 TABLET ORAL NIGHTLY
Qty: 90 TABLET | Refills: 3 | Status: SHIPPED | OUTPATIENT
Start: 2024-12-19

## 2024-12-19 NOTE — PROGRESS NOTES
"Referring Provider:    No referring provider defined for this encounter.  Subjective:   Patient: Smith Love [id number], :1990   Visit date:2024 8:54 AM    Chief Complaint:  Sinusitis (Patient c/o sneezing, coughing, nasal congestion at night,constantly spitting up mucus. She did have a dry cough but not anymore. Pain behind eyes, constant headaches, she also c/o being nauseous and fatigue.)    HPI:    Prior notes reviewed by myself.  Clinical documentation obtained by nursing staff reviewed.     34-year-old female presents for evaluation of sinonasal symptoms including sneezing, congestion, postnasal drip, dry cough, itchy eyes, watery eyes.  She reports intermittent headaches mostly retro-orbital.  No fever or green drainage.  She has had some nausea due to her postnasal drip.  She is normally on an antihistamine, nasal spray and montelukast, but she has been out of all 3 medications for awhile.  She did have a recent allergy evaluation at an outside clinic which she states demonstrated multiple strong positives.  Subcutaneous immunotherapy was recommended but she declined.      Objective:     Physical Exam:  Vitals:  Ht 5' 6" (1.676 m)   Wt 92.2 kg (203 lb 4.2 oz)   LMP 11/15/2024 (Approximate)   BMI 32.81 kg/m²   General appearance:  Well developed, well nourished    Ears:  Otoscopy of external auditory canals and tympanic membranes was normal, clinical speech reception thresholds grossly intact, no mass/lesion of auricle.    Nose:  No masses/lesions of external nose, congested nasal mucosa, septum, and turbinates were hypertrophied 3+, pale.    Mouth:  No mass/lesion of lips, teeth, gums, hard/soft palate, tongue, tonsils, or oropharynx.    Neck & Lymphatics:  No cervical lymphadenopathy, no neck mass/crepitus/ asymmetry, trachea is midline, no thyroid enlargement/tenderness/mass.        [x]  Data Reviewed:    Lab Results   Component Value Date    WBC 5.52 2023    HGB 13.6 " 11/17/2023    HCT 40.1 11/17/2023    MCV 87 11/17/2023    EOSINOPHIL 2.0 11/17/2023               Assessment & Plan:   Non-seasonal allergic rhinitis, unspecified trigger  -     levocetirizine (XYZAL) 5 MG tablet; Take 1 tablet (5 mg total) by mouth every evening.  Dispense: 30 tablet; Refill: 11    Sinus pressure  -     montelukast (SINGULAIR) 10 mg tablet; Take 1 tablet (10 mg total) by mouth every evening.  Dispense: 90 tablet; Refill: 3    Acute recurrent pansinusitis  -     azelastine (ASTELIN) 137 mcg (0.1 %) nasal spray; 1 spray (137 mcg total) by Nasal route 2 (two) times daily.  Dispense: 30 mL; Refill: 2          We discussed her recent symptoms in detail.  She does have fairly severe allergic rhinitis and she has been off of all of her medications.  I suspect that restarting her medications may be the only change that we need to make.  We did review the signs/symptoms of a bacterial sinus infection which she will continue to monitor for.  She will follow up as needed    Osbaldo Monet M.D.  Department of Otolaryngology - Head & Neck Surgery  [address] [facility].  [place] [id number]  P: [phone]  F: [phone]        DISCLAIMER: This note was prepared with [employer] voice recognition transcription software. Garbled syntax, mangled pronouns, and other bizarre constructions may be attributed to that software system. While efforts were made to correct any mistakes made by this voice recognition program, some errors and/or omissions may remain in the note that were missed when the note was originally created.

## 2024-12-20 ENCOUNTER — PATIENT MESSAGE (OUTPATIENT)
Dept: FAMILY MEDICINE | Facility: CLINIC | Age: 34
End: 2024-12-20
Payer: COMMERCIAL

## 2024-12-20 ENCOUNTER — TELEPHONE (OUTPATIENT)
Dept: FAMILY MEDICINE | Facility: CLINIC | Age: 34
End: 2024-12-20
Payer: COMMERCIAL

## 2024-12-20 NOTE — TELEPHONE ENCOUNTER
----- Message from Med Assistant Jaramillo sent at 12/20/2024  9:56 AM CST -----  Contact: pt    ----- Message -----  From: Evelia Webber  Sent: 12/20/2024   9:42 AM CST  To: Vel [facility]OVETT Staff    Type:  Request for a call back to discuss results 12/16 lab (pt already has results but need to discuss)    Name of Caller: pt  Best Call Back Number: [phone]  Additional Information:

## 2024-12-23 ENCOUNTER — PATIENT MESSAGE (OUTPATIENT)
Dept: SPORTS MEDICINE | Facility: CLINIC | Age: 34
End: 2024-12-23
Payer: COMMERCIAL

## 2024-12-23 ENCOUNTER — PATIENT MESSAGE (OUTPATIENT)
Dept: FAMILY MEDICINE | Facility: CLINIC | Age: 34
End: 2024-12-23
Payer: COMMERCIAL

## 2024-12-27 DIAGNOSIS — S62.366D CLOSED NONDISPLACED FRACTURE OF NECK OF FIFTH METACARPAL BONE OF RIGHT HAND WITH ROUTINE HEALING, SUBSEQUENT ENCOUNTER: Primary | ICD-10-CM

## 2024-12-30 ENCOUNTER — HOSPITAL ENCOUNTER (OUTPATIENT)
Dept: RADIOLOGY | Facility: HOSPITAL | Age: 34
Discharge: HOME | End: 2024-12-30
Attending: STUDENT IN AN ORGANIZED HEALTH CARE EDUCATION/TRAINING PROGRAM
Payer: COMMERCIAL

## 2024-12-30 ENCOUNTER — OFFICE VISIT (OUTPATIENT)
Dept: SPORTS MEDICINE | Facility: CLINIC | Age: 34
End: 2024-12-30
Payer: COMMERCIAL

## 2024-12-30 DIAGNOSIS — S62.366D CLOSED NONDISPLACED FRACTURE OF NECK OF FIFTH METACARPAL BONE OF RIGHT HAND WITH ROUTINE HEALING, SUBSEQUENT ENCOUNTER: Primary | ICD-10-CM

## 2024-12-30 DIAGNOSIS — S62.366D CLOSED NONDISPLACED FRACTURE OF NECK OF FIFTH METACARPAL BONE OF RIGHT HAND WITH ROUTINE HEALING, SUBSEQUENT ENCOUNTER: ICD-10-CM

## 2024-12-30 PROCEDURE — 99499 UNLISTED E&M SERVICE: CPT | Mod: S$GLB,,, | Performed by: STUDENT IN AN ORGANIZED HEALTH CARE EDUCATION/TRAINING PROGRAM

## 2024-12-30 PROCEDURE — 73130 X-RAY EXAM OF HAND: CPT | Mod: 26,RT,, | Performed by: RADIOLOGY

## 2024-12-30 PROCEDURE — 73130 X-RAY EXAM OF HAND: CPT | Mod: TC,RT

## 2024-12-30 NOTE — PROGRESS NOTES
Patient ID: Smith Love  YOB: 1990  MRN: [id number]    History of Present Illness:   CHIEF COMPLAINT:  - Smith presents today for 1 month follow-up evaluation of nondisplaced boxer's fracture to the head neck junction 5th metacarpal of the right hand status post cast removal and repeat xrays    HPI:  Smith presents for follow-up after having a cast removed and repeat xrays of her right hand. She expresses concern about the appearance of the hand, describing it as scaly, puffy, and aesthetically displeasing. She reports no pain in the hand, but mentions a tingling sensation which she attributes to swelling. She clarifies that the tingling only started after the cast was removed today. Smith exhibits difficulty making a fist, expressing fear and stiffness. When asked to make a fist, she reports no pain, but inability to fully close the hand. Smith denies any pain in the affected hand. Smith denies any numbness or tingling before the day of cast removal.    ROS:  ROS as indicated in HPI.         Past Medical History:   Past Medical History:   Diagnosis Date    Asthma     COVID-19 08/2020    Essential hypertension 04/27/2017    LGSIL on Pap smear of cervix 11/6/2023    Right knee injury 07/27/2020    Staph aureus infection      No past surgical history on file.  Family History   Problem Relation Name Age of Onset    No Known Problems Mother      Hypertension Father Pablo Love Sr     Asthma Father Pablo Love Sr     Heart failure Paternal Uncle      Hypertension Maternal Grandmother Francesca Castorena     Miscarriages / Stillbirths Maternal Grandmother Francesca Castorena      Social History     Socioeconomic History    Marital status: Single   Tobacco Use    Smoking status: Never     Passive exposure: Never    Smokeless tobacco: Never   Substance and Sexual Activity    Alcohol use: Yes     Alcohol/week: 1.0 standard drink of alcohol     Types: 1 Glasses of wine per week      Comment: socially    Drug use: No    Sexual activity: Yes     Partners: Male     Birth control/protection: None     Social Drivers of Health     Financial Resource Strain: Low Risk  (11/6/2023)    Overall Financial Resource Strain (CARDIA)     Difficulty of Paying Living Expenses: Not hard at all   Food Insecurity: No Food Insecurity (11/6/2023)    Hunger Vital Sign     Worried About Running Out of Food in the Last Year: Never true     Ran Out of Food in the Last Year: Never true   Transportation Needs: No Transportation Needs (11/6/2023)    PRAPARE - Transportation     Lack of Transportation (Medical): No     Lack of Transportation (Non-Medical): No   Physical Activity: Sufficiently Active (2/16/2024)    Exercise Vital Sign     Days of Exercise per Week: 5 days     Minutes of Exercise per Session: 60 min   Stress: Stress Concern Present (11/6/2023)    [employer] - Occupational Stress Questionnaire     Feeling of Stress : Rather much   Housing Stability: Low Risk  (11/6/2023)    Housing Stability Vital Sign     Unable to Pay for Housing in the Last Year: No     Number of Places Lived in the Last Year: 1     Unstable Housing in the Last Year: No     Medication List with Changes/Refills   Current Medications    ALBUTEROL (PROVENTIL) 2.5 MG /3 ML (0.083 %) NEBULIZER SOLUTION    Take 3 mLs (2.5 mg total) by nebulization every 6 (six) hours as needed for Wheezing or Shortness of Breath. Rescue    ALBUTEROL (VENTOLIN HFA) 90 MCG/ACTUATION INHALER    Inhale 2 puffs into the lungs every 6 (six) hours as needed for Wheezing. Inhaler    AMLODIPINE (NORVASC) 2.5 MG TABLET    Take 1 tablet by mouth once daily    AMLODIPINE (NORVASC) 5 MG TABLET    TAKE 1 TABLET BY MOUTH DAILY TAKE WITH 2.5MG FOR TOTAL 7.5 MG DAILY    AZELASTINE (ASTELIN) 137 MCG (0.1 %) NASAL SPRAY    1 spray (137 mcg total) by Nasal route 2 (two) times daily.    BLOOD SUGAR DIAGNOSTIC STRP    1 each by Misc.(Non-Drug; Combo Route)  route 3 (three) times daily.    BLOOD-GLUCOSE METER (BLOOD GLUCOSE MONITORING) KIT    Use as instructed    BUSPIRONE (BUSPAR) 5 MG TAB    Take 5 mg by mouth 3 (three) times daily.    DICLOFENAC SODIUM (VOLTAREN) 1 % GEL    Apply 2 g topically 4 (four) times daily. for 10 days    FLUTICASONE-SALMETEROL DISKUS INHALER 250-50 MCG    Inhale 1 puff into the lungs 2 (two) times daily. Controller    HYDROCHLOROTHIAZIDE (HYDRODIURIL) 25 MG TABLET    Take 1 tablet (25 mg total) by mouth once daily.    LEVOCETIRIZINE (XYZAL) 5 MG TABLET    Take 1 tablet (5 mg total) by mouth every evening.    MONTELUKAST (SINGULAIR) 10 MG TABLET    Take 1 tablet (10 mg total) by mouth every evening.     Review of patient's allergies indicates:  No Known Allergies    Physical Exam:   There is no height or weight on file to calculate BMI.    GENERAL: Well appearing, in no acute distress.  HEAD: Normocephalic and atraumatic.  ENT: External ears and nose grossly normal.  EYES: EOMI bilaterally  PULMONARY: Respirations are grossly even and non-labored.  NEURO: Awake, alert, and oriented x 3.  SKIN: No obvious rashes appreciated.  PSYCH: Mood & affect are appropriate.    Detailed MSK exam:     No tenderness right hand. Can make a fist with stiffness. Dry skin over right hand volar and dorsally. Minor swelling nonspecific throughout hand     Imaging:    Callus formation and healing of 5th metacarpal neck fracture no movement of rotation appreciated      Relevant imaging results were reviewed and interpreted by me and per my read as above.  This was discussed with the patient and / or family today.     Assessment:  Smith Love is a 34 y.o. female presents today for follow-up of right 5th metacarpal neck fracture,.  Callus formation today and no tenderness on exam.  Discussed continuing with active and passive range of motion over the next 5-7 days if having difficulty getting back to making a full fist by that time could recommend hand  therapy.  Otherwise no further immobilization needed.  Follow-up with me as needed in the future.  X-rays reviewed today all questions answered fiance present for today's visit.    Closed nondisplaced fracture of neck of fifth metacarpal bone of right hand with routine healing, subsequent encounter         This note was generated with the assistance of ambient listening technology. Verbal consent was obtained by the patient and accompanying visitor(s) for the recording of patient appointment to facilitate this note. I attest to having reviewed and edited the generated note for accuracy, though some syntax or spelling errors may persist. Please contact the author of this note for any clarification.          Robert Mari MD    Disclaimer: This note was prepared using a voice recognition system and is likely to have sound alike errors within the text.

## 2025-01-03 ENCOUNTER — OFFICE VISIT (OUTPATIENT)
Dept: FAMILY MEDICINE | Facility: CLINIC | Age: 35
End: 2025-01-03
Attending: FAMILY MEDICINE
Payer: COMMERCIAL

## 2025-01-03 DIAGNOSIS — E11.9 TYPE 2 DIABETES MELLITUS WITHOUT COMPLICATION, WITHOUT LONG-TERM CURRENT USE OF INSULIN: Primary | ICD-10-CM

## 2025-01-03 PROCEDURE — 98005 SYNCH AUDIO-VIDEO EST LOW 20: CPT | Mod: 95,,, | Performed by: FAMILY MEDICINE

## 2025-01-03 PROCEDURE — 1160F RVW MEDS BY RX/DR IN RCRD: CPT | Mod: CPTII,95,, | Performed by: FAMILY MEDICINE

## 2025-01-03 PROCEDURE — G2211 COMPLEX E/M VISIT ADD ON: HCPCS | Mod: 95,,, | Performed by: FAMILY MEDICINE

## 2025-01-03 PROCEDURE — 1159F MED LIST DOCD IN RCRD: CPT | Mod: CPTII,95,, | Performed by: FAMILY MEDICINE

## 2025-01-03 RX ORDER — METFORMIN HYDROCHLORIDE 500 MG/1
500 TABLET ORAL 2 TIMES DAILY WITH MEALS
Qty: 60 TABLET | Refills: 5 | Status: SHIPPED | OUTPATIENT
Start: 2025-01-03

## 2025-01-03 NOTE — PROGRESS NOTES
Smith Starla Ivan    Chief Complaint   Patient presents with    Discuss medication       History of Present Illness:   The patient location is: In [place]  The chief complaint leading to consultation is: Medication Discussion    Visit type: audiovisual    Face to Face time with patient: 25 minutes with 25 minutes of total time spent on the encounter, which includes face to face time and non-face to face time preparing to see the patient (eg, review of tests), Obtaining and/or reviewing separately obtained history, Documenting clinical information in the electronic or other health record, Independently interpreting results (not separately reported) and communicating results to the patient/family/caregiver, or Care coordination (not separately reported).         Each patient to whom he or she provides medical services by telemedicine is:  (1) informed of the relationship between the physician and patient and the respective role of any other health care provider with respect to management of the patient; and (2) notified that he or she may decline to receive medical services by telemedicine and may withdraw from such care at any time.    Notes:      Ms. Love visits with me today for medication discussion. She saw RACHEL Crowder on 12/19/2024 for type 2 diabetes mellitus without complication, without long-term current use of insulin, anxiety, nausea with labs with showed higher A1c than 1 year ago when it was also elevated.  She states she exercises 7 times per week, 2 miles per day - walk - jog for 40 minutes. She states she monitors diet - protein, low carbohydrate.  She states she is sexually active without use of condoms.    She denies having fever, chills, fatigue, appetite changes; shortness of breath, cough, wheezing; chest pain, palpitations, leg swelling; abdominal pain, nausea, vomiting, diarrhea, constipation; unusual urinary symptoms; polydipsia, polyphagia, polyuria, hot or cold intolerance; back pain; acute  visual changes, numbness, headache; anxiety, depression, homicidal or suicidal thoughts.                 Labs:                   WBC                      5.52                11/17/2023                 HGB                      13.6                11/17/2023                 HCT                      40.1                11/17/2023                 PLT                      294                 11/17/2023                 CHOL                     106 (L)             03/12/2021                 TRIG                     30                  03/12/2021                 HDL                      42                  03/12/2021                 ALT                      21                  11/17/2023                 AST                      16                  11/17/2023                 NA                       134 (L)             12/19/2024                 K                        4.1                 12/19/2024                 CL                       101                 12/19/2024                 CREATININE               0.8                 12/19/2024                 BUN                      8                   12/19/2024                 CO2                      26                  12/19/2024                 TSH                      2.418               10/26/2023                 GLUF                     148 (H)             08/29/2018                 HGBA1C                   9.0 (H)             12/19/2024            LDLCALC                  58.0 (L)            03/12/2021              Diabetes  She has type 2 diabetes mellitus. No MedicAlert identification noted. The initial diagnosis of diabetes was made 10 months ago. Pertinent negatives for hypoglycemia include no confusion, dizziness, headaches, hunger, mood changes, nervousness/anxiousness, sleepiness or sweats. Associated symptoms include visual change. Pertinent negatives for diabetes include no chest pain, no fatigue, no polydipsia, no polyphagia and no polyuria. Pertinent  negatives for hypoglycemia complications include no blackouts, no hospitalization, no nocturnal hypoglycemia, no required assistance and no required glucagon injection. Symptoms are improving. Pertinent negatives for diabetic complications include no autonomic neuropathy, CVA, heart disease, nephropathy, peripheral neuropathy, PVD or retinopathy. Risk factors for coronary artery disease include hypertension, obesity and stress. Current diabetic treatment includes diet. She is compliant with treatment all of the time. Her weight is stable. She is following a generally healthy diet. Meal planning includes avoidance of concentrated sweets, calorie counting and carbohydrate counting. She has not had a previous visit with a dietitian. She participates in exercise daily. She does not see a podiatrist.Eye exam is current.       Current Outpatient Medications   Medication Sig    albuterol (PROVENTIL) 2.5 mg /3 mL (0.083 %) nebulizer solution Take 3 mLs (2.5 mg total) by nebulization every 6 (six) hours as needed for Wheezing or Shortness of Breath. Rescue    albuterol (VENTOLIN HFA) 90 mcg/actuation inhaler Inhale 2 puffs into the lungs every 6 (six) hours as needed for Wheezing. Inhaler    amLODIPine (NORVASC) 2.5 MG tablet Take 1 tablet by mouth once daily    amLODIPine (NORVASC) 5 MG tablet TAKE 1 TABLET BY MOUTH DAILY TAKE WITH 2.5MG FOR TOTAL 7.5 MG DAILY    azelastine (ASTELIN) 137 mcg (0.1 %) nasal spray 1 spray (137 mcg total) by Nasal route 2 (two) times daily.    blood sugar diagnostic Strp 1 each by Misc.(Non-Drug; Combo Route) route 3 (three) times daily.    busPIRone (BUSPAR) 5 MG Tab Take 5 mg by mouth 3 (three) times daily.    hydroCHLOROthiazide (HYDRODIURIL) 25 MG tablet Take 1 tablet (25 mg total) by mouth once daily.    levocetirizine (XYZAL) 5 MG tablet Take 1 tablet (5 mg total) by mouth every evening.    blood-glucose meter (BLOOD GLUCOSE MONITORING) kit Use as instructed    diclofenac sodium  (VOLTAREN) 1 % Gel Apply 2 g topically 4 (four) times daily. for 10 days    fluticasone-salmeterol diskus inhaler 250-50 mcg Inhale 1 puff into the lungs 2 (two) times daily. Controller (Patient not taking: Reported on 1/3/2025)     Review of Systems   Constitutional:  Negative for activity change, appetite change, chills, fatigue and fever.   Respiratory:  Negative for cough, shortness of breath and wheezing.    Cardiovascular:  Negative for chest pain, palpitations and leg swelling.   Gastrointestinal:  Negative for abdominal pain, constipation, diarrhea, nausea and vomiting.   Endocrine: Negative for cold intolerance, heat intolerance, polydipsia, polyphagia and polyuria.        See history of present illness.   Genitourinary:  Negative for difficulty urinating.   Musculoskeletal:  Negative for back pain.   Neurological:  Negative for dizziness, numbness and headaches.   Psychiatric/Behavioral:  Negative for confusion, dysphoric mood and suicidal ideas. The patient is not nervous/anxious.         Negative for homicidal ideas.       Objective:  Physical Exam  Vitals reviewed.   Constitutional:       General: She is not in acute distress.     Appearance: Normal appearance. She is obese. She is not ill-appearing, toxic-appearing or diaphoretic.      Comments: Pleasant.   Pulmonary:      Effort: Pulmonary effort is normal. No respiratory distress.   Musculoskeletal:         General: Normal range of motion.      Comments: She moves around her space without problems during virtual visit today.   Neurological:      General: No focal deficit present.      Mental Status: She is alert and oriented to person, place, and time.   Psychiatric:         Mood and Affect: Mood normal.         Behavior: Behavior normal.         Thought Content: Thought content normal.         Judgment: Judgment normal.         ASSESSMENT:  1. Type 2 diabetes mellitus without complication, without long-term current use of insulin         PLAN:  Smith was seen today for discuss medication.    Diagnoses and all orders for this visit:    Type 2 diabetes mellitus without complication, without long-term current use of insulin  -     metFORMIN (GLUCOPHAGE) 500 MG tablet; Take 1 tablet (500 mg total) by mouth 2 (two) times daily with meals.      Above results discussed with patient.  Continue current medications, follow low sodium, low cholesterol, low carb diet, daily walks.  Add Metformin 500 mg twice daily; medication precautions discussed with patient.  Patient offered to perform home glucose monitoring; she declined at this time.  Keep follow up with specialists.  Flu shot this fall.  Follow up if symptoms worsen or fail to improve. Keep 2/25/2025 physical appt w/me.

## 2025-01-07 ENCOUNTER — PATIENT OUTREACH (OUTPATIENT)
Dept: ADMINISTRATIVE | Facility: HOSPITAL | Age: 35
End: 2025-01-07
Payer: COMMERCIAL

## 2025-01-07 NOTE — PROGRESS NOTES
Attempted to contact the patient to discuss/schedule overdue HM screenings, no answer, left voicemail. Patient has next PCP appointment on 2/25/25 for physical.

## 2025-01-08 DIAGNOSIS — E11.9 TYPE 2 DIABETES MELLITUS WITHOUT COMPLICATION: ICD-10-CM

## 2025-02-03 PROBLEM — E11.9 TYPE 2 DIABETES MELLITUS WITHOUT COMPLICATION, WITHOUT LONG-TERM CURRENT USE OF INSULIN: Status: ACTIVE | Noted: 2025-02-03

## 2025-02-04 DIAGNOSIS — I10 HYPERTENSION, UNSPECIFIED TYPE: ICD-10-CM

## 2025-02-04 RX ORDER — AMLODIPINE BESYLATE 2.5 MG/1
2.5 TABLET ORAL
Qty: 90 TABLET | Refills: 3 | Status: SHIPPED | OUTPATIENT
Start: 2025-02-04

## 2025-02-04 NOTE — TELEPHONE ENCOUNTER
No care due was identified.  [facility] Embedded Care Due Messages. Reference number: [id number].   2/04/2025 2:10:29 PM CST

## 2025-02-05 NOTE — TELEPHONE ENCOUNTER
Refill Decision Note   Johannebridger Love  is requesting a refill authorization.  Brief Assessment and Rationale for Refill:  Approve     Medication Therapy Plan:         Alert overridden per protocol: Yes   Comments:     Note composed:6:58 PM 02/04/2025

## 2025-02-24 ENCOUNTER — TELEPHONE (OUTPATIENT)
Dept: OBSTETRICS AND GYNECOLOGY | Facility: CLINIC | Age: 35
End: 2025-02-24
Payer: COMMERCIAL

## 2025-02-24 NOTE — TELEPHONE ENCOUNTER
Called pt regarding appt on 2/25 for f/u confirmed pt identifiers pt stated she no longer needs appt had questions for provider but did not disclose those questions to me informed she can send a message to the provider and she will get back with her as soon as possible. Declined r/s appt for any other concerns.

## 2025-03-25 ENCOUNTER — LAB VISIT (OUTPATIENT)
Dept: LAB | Facility: HOSPITAL | Age: 35
End: 2025-03-25
Attending: FAMILY MEDICINE
Payer: COMMERCIAL

## 2025-03-25 ENCOUNTER — OFFICE VISIT (OUTPATIENT)
Dept: FAMILY MEDICINE | Facility: CLINIC | Age: 35
End: 2025-03-25
Attending: FAMILY MEDICINE
Payer: COMMERCIAL

## 2025-03-25 VITALS
SYSTOLIC BLOOD PRESSURE: 118 MMHG | TEMPERATURE: 99 F | DIASTOLIC BLOOD PRESSURE: 88 MMHG | HEIGHT: 66 IN | BODY MASS INDEX: 32.38 KG/M2 | OXYGEN SATURATION: 99 % | HEART RATE: 104 BPM | WEIGHT: 201.5 LBS | RESPIRATION RATE: 18 BRPM

## 2025-03-25 DIAGNOSIS — E66.811 OBESITY (BMI 30.0-34.9): ICD-10-CM

## 2025-03-25 DIAGNOSIS — R06.2 WHEEZING: ICD-10-CM

## 2025-03-25 DIAGNOSIS — J30.2 SEASONAL ALLERGIC RHINITIS, UNSPECIFIED TRIGGER: ICD-10-CM

## 2025-03-25 DIAGNOSIS — E11.9 TYPE 2 DIABETES MELLITUS WITHOUT COMPLICATION, WITHOUT LONG-TERM CURRENT USE OF INSULIN: Primary | ICD-10-CM

## 2025-03-25 DIAGNOSIS — E11.9 TYPE 2 DIABETES MELLITUS WITHOUT COMPLICATION, WITHOUT LONG-TERM CURRENT USE OF INSULIN: ICD-10-CM

## 2025-03-25 DIAGNOSIS — E11.9 TYPE 2 DIABETES MELLITUS WITHOUT COMPLICATION: ICD-10-CM

## 2025-03-25 DIAGNOSIS — I10 HYPERTENSION, UNSPECIFIED TYPE: ICD-10-CM

## 2025-03-25 PROCEDURE — 3074F SYST BP LT 130 MM HG: CPT | Mod: CPTII,S$GLB,, | Performed by: FAMILY MEDICINE

## 2025-03-25 PROCEDURE — 80061 LIPID PANEL: CPT

## 2025-03-25 PROCEDURE — 36415 COLL VENOUS BLD VENIPUNCTURE: CPT | Mod: PO

## 2025-03-25 PROCEDURE — G2211 COMPLEX E/M VISIT ADD ON: HCPCS | Mod: S$GLB,,, | Performed by: FAMILY MEDICINE

## 2025-03-25 PROCEDURE — 3079F DIAST BP 80-89 MM HG: CPT | Mod: CPTII,S$GLB,, | Performed by: FAMILY MEDICINE

## 2025-03-25 PROCEDURE — 1159F MED LIST DOCD IN RCRD: CPT | Mod: CPTII,S$GLB,, | Performed by: FAMILY MEDICINE

## 2025-03-25 PROCEDURE — 99999 PR PBB SHADOW E&M-EST. PATIENT-LVL IV: CPT | Mod: PBBFAC,,, | Performed by: FAMILY MEDICINE

## 2025-03-25 PROCEDURE — 3008F BODY MASS INDEX DOCD: CPT | Mod: CPTII,S$GLB,, | Performed by: FAMILY MEDICINE

## 2025-03-25 PROCEDURE — 83036 HEMOGLOBIN GLYCOSYLATED A1C: CPT

## 2025-03-25 PROCEDURE — 1160F RVW MEDS BY RX/DR IN RCRD: CPT | Mod: CPTII,S$GLB,, | Performed by: FAMILY MEDICINE

## 2025-03-25 PROCEDURE — 3044F HG A1C LEVEL LT 7.0%: CPT | Mod: CPTII,S$GLB,, | Performed by: FAMILY MEDICINE

## 2025-03-25 PROCEDURE — 99214 OFFICE O/P EST MOD 30 MIN: CPT | Mod: S$GLB,,, | Performed by: FAMILY MEDICINE

## 2025-03-25 PROCEDURE — 80053 COMPREHEN METABOLIC PANEL: CPT

## 2025-03-25 RX ORDER — AZELASTINE 1 MG/ML
1 SPRAY, METERED NASAL 2 TIMES DAILY
Qty: 30 ML | Refills: 2 | Status: SHIPPED | OUTPATIENT
Start: 2025-03-25

## 2025-03-25 RX ORDER — BLOOD-GLUCOSE SENSOR
EACH MISCELLANEOUS
Qty: 3 EACH | Refills: 11 | Status: SHIPPED | OUTPATIENT
Start: 2025-03-25

## 2025-03-25 RX ORDER — HYDROCHLOROTHIAZIDE 25 MG/1
25 TABLET ORAL DAILY
Qty: 30 TABLET | Refills: 5 | Status: SHIPPED | OUTPATIENT
Start: 2025-03-25

## 2025-03-25 RX ORDER — ALBUTEROL SULFATE 90 UG/1
2 INHALANT RESPIRATORY (INHALATION) EVERY 6 HOURS PRN
Qty: 18 G | Refills: 1 | Status: SHIPPED | OUTPATIENT
Start: 2025-03-25

## 2025-03-25 NOTE — PROGRESS NOTES
Smith Love    Chief Complaint   Patient presents with    Follow-up    Diabetes       History of Present Illness:   Ms. Love comes in today for diabetes follow-up.  She states she is not fasting but has taken medication today.  She states she has been taking metformin 500 mg twice daily for diabetes control since January 3, 2025 without problems.  She states she does not perform home glucose checks but desires to monitor glucose levels using CGM.  She states she monitors her diet.  She states she inconsistently exercise.      She complains of feeling slightly tired, anxious, and jittery lately.  However, she states she has not been taking BuSpar for anxiety.    She states she continues to take amlodipine 7.5 mg daily for blood pressure control.  She states she performs home blood pressure checks with levels ranging 115/77-83.    She states she recently  on March 7, 2025 and also has a new job with [employer] as an [employer] .    Otherwise,  she denies having fever, chills, appetite changes; shortness of breath, cough, wheezing; chest pain, palpitations, leg swelling; abdominal pain, nausea, vomiting, diarrhea, constipation; unusual urinary symptoms; polydipsia, polyphagia, polyuria, hot or cold intolerance; back pain; acute visual changes, numbness, headache; depression, homicidal or suicidal thoughts.               Labs:                    WBC                      5.52                11/17/2023                 HGB                      13.6                11/17/2023                 HCT                      40.1                11/17/2023                 PLT                      294                 11/17/2023                 CHOL                     106 (L)             03/12/2021                 TRIG                     30                  03/12/2021                 HDL                      42                  03/12/2021                 ALT                      21                   11/17/2023                 AST                      16                  11/17/2023                 NA                       134 (L)             12/19/2024                 K                        4.1                 12/19/2024                 CL                       101                 12/19/2024                 CREATININE               0.8                 12/19/2024                 BUN                      8                   12/19/2024                 CO2                      26                  12/19/2024                 TSH                      2.418               10/26/2023                 GLUF                     148 (H)             08/29/2018                 HGBA1C                   9.0 (H)             12/19/2024            LDLCALC                  58.0 (L)            03/12/2021              CALCIUM                  9.2                 12/19/2024                 ANIONGAP                 7 (L)               12/19/2024                 EGFRNORACEVR             >60.0               12/19/2024                   Current Outpatient Medications   Medication Sig    albuterol (PROVENTIL) 2.5 mg /3 mL (0.083 %) nebulizer solution Take 3 mLs (2.5 mg total) by nebulization every 6 (six) hours as needed for Wheezing or Shortness of Breath. Rescue    albuterol (VENTOLIN HFA) 90 mcg/actuation inhaler Inhale 2 puffs into the lungs every 6 (six) hours as needed for Wheezing. Inhaler    amLODIPine (NORVASC) 2.5 MG tablet Take 1 tablet by mouth once daily    amLODIPine (NORVASC) 5 MG tablet TAKE 1 TABLET BY MOUTH DAILY TAKE WITH 2.5MG FOR TOTAL 7.5 MG DAILY    azelastine (ASTELIN) 137 mcg (0.1 %) nasal spray 1 spray (137 mcg total) by Nasal route 2 (two) times daily.    hydroCHLOROthiazide (HYDRODIURIL) 25 MG tablet Take 1 tablet (25 mg total) by mouth once daily.    levocetirizine (XYZAL) 5 MG tablet Take 1 tablet (5 mg total) by mouth every evening.    metFORMIN (GLUCOPHAGE) 500 MG tablet Take 1 tablet (500 mg total) by mouth  2 (two) times daily with meals.    blood sugar diagnostic Strp 1 each by Misc.(Non-Drug; Combo Route) route 3 (three) times daily. (Patient not taking: Reported on 3/25/2025)    blood-glucose meter (BLOOD GLUCOSE MONITORING) kit Use as instructed         busPIRone (BUSPAR) 5 MG Tab Take 5 mg by mouth 3 (three) times daily. (Patient not taking: Reported on 3/25/2025)    diclofenac sodium (VOLTAREN) 1 % Gel Apply 2 g topically 4 (four) times daily. for 10 days    fluticasone-salmeterol diskus inhaler 250-50 mcg Inhale 1 puff into the lungs 2 (two) times daily. Controller (Patient not taking: Reported on 3/25/2025)     Review of Systems   Constitutional:  Positive for fatigue. Negative for activity change, appetite change, chills and fever.   Respiratory:  Negative for cough, shortness of breath and wheezing.    Cardiovascular:  Negative for chest pain, palpitations and leg swelling.        See history of present illness.   Gastrointestinal:  Negative for abdominal pain, constipation, diarrhea, nausea and vomiting.   Endocrine: Negative for cold intolerance, heat intolerance, polydipsia, polyphagia and polyuria.        See history of present illness.   Genitourinary:  Negative for difficulty urinating.   Musculoskeletal:  Negative for back pain.   Neurological:  Negative for numbness and headaches.   Psychiatric/Behavioral:  Negative for dysphoric mood and suicidal ideas. The patient is nervous/anxious.         Negative for homicidal ideas. See history of present illness.       Objective:  Physical Exam  Vitals reviewed.   Constitutional:       General: She is not in acute distress.     Appearance: Normal appearance. She is obese. She is not ill-appearing, toxic-appearing or diaphoretic.      Comments: Pleasant.   Cardiovascular:      Rate and Rhythm: Normal rate and regular rhythm.      Pulses:           Dorsalis pedis pulses are 3+ on the right side and 3+ on the left side.      Heart sounds: No murmur  heard.  Pulmonary:      Effort: Pulmonary effort is normal. No respiratory distress.      Breath sounds: Normal breath sounds. No wheezing.   Abdominal:      General: Bowel sounds are normal. There is no distension.      Palpations: Abdomen is soft. There is no mass.      Tenderness: There is no abdominal tenderness. There is no guarding or rebound.   Musculoskeletal:         General: No swelling or tenderness. Normal range of motion.      Cervical back: Normal range of motion and neck supple. No tenderness.      Comments: She is ambulatory without problems.   Feet:      Right foot:      Protective Sensation: 5 sites tested.  5 sites sensed.      Skin integrity: No ulcer or skin breakdown.      Left foot:      Protective Sensation: 5 sites tested.  5 sites sensed.      Skin integrity: No ulcer or skin breakdown.   Lymphadenopathy:      Cervical: No cervical adenopathy.   Skin:     General: Skin is warm.   Neurological:      General: No focal deficit present.      Mental Status: She is alert and oriented to person, place, and time.   Psychiatric:         Mood and Affect: Mood normal.         Behavior: Behavior normal.         Thought Content: Thought content normal.         Judgment: Judgment normal.       ASSESSMENT:  1. Type 2 diabetes mellitus without complication, without long-term current use of insulin    2. Hypertension, unspecified type    3. Wheezing    4. Seasonal allergic rhinitis, unspecified trigger    5. Obesity (BMI 30.0-34.9)        PLAN:  Smith was seen today for follow-up and diabetes.    Diagnoses and all orders for this visit:    Type 2 diabetes mellitus without complication, without long-term current use of insulin - suboptimally controlled but on medication at this time  -     Hemoglobin A1C; Future  -     Comprehensive Metabolic Panel; Future  -     blood-glucose sensor (DEXCOM G7 SENSOR) Bhakti; Use for up to 10 days  -     Ambulatory referral/consult to Optometry; Future    Hypertension,  unspecified type - stable on medication  -     hydroCHLOROthiazide (HYDRODIURIL) 25 MG tablet; Take 1 tablet (25 mg total) by mouth once daily.    Wheezing - stable on medication  -     albuterol (VENTOLIN HFA) 90 mcg/actuation inhaler; Inhale 2 puffs into the lungs every 6 (six) hours as needed for Wheezing. Inhaler    Seasonal allergic rhinitis, unspecified trigger  - stable on medication  -     azelastine (ASTELIN) 137 mcg (0.1 %) nasal spray; 1 spray (137 mcg total) by Nasal route 2 (two) times daily.    Obesity (BMI 30.0-34.9) - managed with diet and exercise      Patient advised to call for results.  Continue current medications, follow low sodium, low cholesterol, low carb diet, daily walks.  Prescription for diabetes monitoring supplies - goal glucose 80 to 150.  Keep follow up with specialists.  Patient declines flu shot today.  Work excuse for today with return tomorrow.  Follow up in about 6 months (around 9/25/2025) for physical.

## 2025-03-26 LAB
ALBUMIN SERPL BCP-MCNC: 3.9 G/DL (ref 3.5–5.2)
ALP SERPL-CCNC: 73 UNIT/L (ref 40–150)
ALT SERPL W/O P-5'-P-CCNC: 13 UNIT/L (ref 10–44)
ANION GAP (OHS): 10 MMOL/L (ref 8–16)
AST SERPL-CCNC: 13 UNIT/L (ref 11–45)
BILIRUB SERPL-MCNC: 0.3 MG/DL (ref 0.1–1)
BUN SERPL-MCNC: 10 MG/DL (ref 6–20)
CALCIUM SERPL-MCNC: 9.8 MG/DL (ref 8.7–10.5)
CHLORIDE SERPL-SCNC: 105 MMOL/L (ref 95–110)
CHOLEST SERPL-MCNC: 128 MG/DL (ref 120–199)
CHOLEST/HDLC SERPL: 3.7 {RATIO} (ref 2–5)
CO2 SERPL-SCNC: 20 MMOL/L (ref 23–29)
CREAT SERPL-MCNC: 0.9 MG/DL (ref 0.5–1.4)
EAG (OHS): 128 MG/DL (ref 68–131)
GFR SERPLBLD CREATININE-BSD FMLA CKD-EPI: >60 ML/MIN/1.73/M2
GLUCOSE SERPL-MCNC: 122 MG/DL (ref 70–110)
HBA1C MFR BLD: 6.1 % (ref 4–5.6)
HDLC SERPL-MCNC: 35 MG/DL (ref 40–75)
HDLC SERPL: 27.3 % (ref 20–50)
LDLC SERPL CALC-MCNC: 77.8 MG/DL (ref 63–159)
NONHDLC SERPL-MCNC: 93 MG/DL
POTASSIUM SERPL-SCNC: 3.8 MMOL/L (ref 3.5–5.1)
PROT SERPL-MCNC: 7.4 GM/DL (ref 6–8.4)
SODIUM SERPL-SCNC: 135 MMOL/L (ref 136–145)
TRIGL SERPL-MCNC: 76 MG/DL (ref 30–150)

## 2025-03-27 ENCOUNTER — PATIENT MESSAGE (OUTPATIENT)
Dept: FAMILY MEDICINE | Facility: CLINIC | Age: 35
End: 2025-03-27
Payer: COMMERCIAL

## 2025-03-27 ENCOUNTER — TELEPHONE (OUTPATIENT)
Dept: FAMILY MEDICINE | Facility: CLINIC | Age: 35
End: 2025-03-27

## 2025-03-27 ENCOUNTER — RESULTS FOLLOW-UP (OUTPATIENT)
Dept: FAMILY MEDICINE | Facility: CLINIC | Age: 35
End: 2025-03-27

## 2025-03-27 NOTE — TELEPHONE ENCOUNTER
Patient sent over the following my chart message: Hi Dr. Hernandez,      I recently received my sodium level results, Should I be concerned about them based them being low, what could be the possible causes?     Would increasing my electrolyte intake help? Also, could any medications or medical conditions be affecting my sodium levels?

## 2025-03-27 NOTE — TELEPHONE ENCOUNTER
Hydrochlorothiazide (fluid/blood pressure medication) may sometimes cause sodium to drop. Sodium level is 1 point from normal; so, no need to be overly concerned about this and recommend continue hydrochlorothiazide.

## 2025-04-10 ENCOUNTER — PATIENT OUTREACH (OUTPATIENT)
Dept: ADMINISTRATIVE | Facility: HOSPITAL | Age: 35
End: 2025-04-10
Payer: COMMERCIAL

## 2025-04-10 NOTE — PROGRESS NOTES
VBHM Score: 0     Patient is not due for any topics at this time.               Health Maintenance Topic(s) Outreach Outcomes & Actions Taken:    Eye Exam - Outreach Outcomes & Actions Taken  : patient has eye exam scheduled with  on 6/18/25       Additional Notes:  Patients next PCP appt is 9/26/25 for physical.

## 2025-05-15 ENCOUNTER — OFFICE VISIT (OUTPATIENT)
Dept: URGENT CARE | Facility: CLINIC | Age: 35
End: 2025-05-15
Payer: COMMERCIAL

## 2025-05-15 VITALS
TEMPERATURE: 98 F | OXYGEN SATURATION: 99 % | HEIGHT: 66 IN | RESPIRATION RATE: 16 BRPM | SYSTOLIC BLOOD PRESSURE: 134 MMHG | DIASTOLIC BLOOD PRESSURE: 93 MMHG | WEIGHT: 200.38 LBS | BODY MASS INDEX: 32.2 KG/M2 | HEART RATE: 91 BPM

## 2025-05-15 DIAGNOSIS — K59.00 CONSTIPATION, UNSPECIFIED CONSTIPATION TYPE: Primary | ICD-10-CM

## 2025-05-15 PROCEDURE — 99213 OFFICE O/P EST LOW 20 MIN: CPT | Mod: S$GLB,,, | Performed by: NURSE PRACTITIONER

## 2025-05-15 NOTE — PATIENT INSTRUCTIONS
Go to the Emergency Department for any new or worsening symptoms including: worsening abdominal pain, dark\black\bloody bowel movements, vomiting blood, hard abdomen, fever, chest pain, shortness of breath, loss of consciousness or any other concerns.    If you were prescribed a narcotic or controlled medication, do not drive or operate heavy equipment or machinery while taking these medications.    You must understand that you've received an Urgent Care treatment only and that you may be released before all your medical problems are known or treated. You, the patient, will arrange for follow up care as instructed.    Follow up with your PCP or specialty clinic as directed within 2-5 days if not improved or as needed.  You can call [phone] to schedule an appointment with the appropriate provider.

## 2025-05-15 NOTE — PROGRESS NOTES
"Subjective:      Patient ID: Smith Love is a 34 y.o. female.    Vitals:  height is 5' 6" (1.676 m) and weight is 90.9 kg (200 lb 6.4 oz). Her oral temperature is 97.7 °F (36.5 °C). Her blood pressure is 134/93 (abnormal) and her pulse is 91. Her respiration is 16 and oxygen saturation is 99%.     Chief Complaint: Abdominal Pain    34 yr old female presents to the Urgent Care with complaint of constipation associated with abdominal bloating sensation, belching and flatus x 4 days. Symptoms began after eating crawfish. Patient has had small bowel movement and still feel like she's "not completely empty." Patient denies any CP, SOB or fever.     Abdominal Pain  This is a new problem. The current episode started in the past 7 days. The onset quality is gradual. The problem occurs intermittently. The pain is located in the generalized abdominal region. The pain is at a severity of 0/10. The patient is experiencing no pain. The abdominal pain does not radiate. Associated symptoms include belching, constipation, flatus and nausea. Pertinent negatives include no anorexia, arthralgias, diarrhea, dysuria, fever, frequency, headaches, hematochezia, hematuria, melena, myalgias, vomiting or weight loss. The pain is relieved by Belching, bowel movements and passing flatus. Treatments tried: Pepto. The treatment provided no relief. There is no history of gallstones, GERD, irritable bowel syndrome, pancreatitis or ulcerative colitis. Patient's medical history does not include kidney stones and UTI.       Constitution: Negative for sweating, fatigue and fever.   Gastrointestinal:  Positive for abdominal pain, nausea and constipation. Negative for vomiting, diarrhea and bright red blood in stool.   Genitourinary:  Negative for dysuria, frequency and hematuria.   Musculoskeletal:  Negative for joint pain and muscle ache.   Neurological:  Negative for headaches.      Objective:     Physical Exam   Constitutional: She is " oriented to person, place, and time. She appears well-developed.   HENT:   Head: Normocephalic and atraumatic.   Ears:   Right Ear: External ear normal.   Left Ear: External ear normal.   Nose: Nose normal.   Mouth/Throat: Mucous membranes are normal.   Cardiovascular: Normal rate, regular rhythm and normal heart sounds.   Pulmonary/Chest: Effort normal and breath sounds normal. No respiratory distress.   Abdominal: Normal appearance and bowel sounds are normal. She exhibits no distension and no mass. Soft. There is no abdominal tenderness. There is no rebound, no guarding, no tenderness at McBurney's point, negative Deras's sign, no left CVA tenderness and no right CVA tenderness.   Musculoskeletal: Normal range of motion.         General: Normal range of motion.   Neurological: She is alert and oriented to person, place, and time. She has normal strength.   Skin: Skin is warm, dry, intact, not diaphoretic and not pale.   Psychiatric: Her speech is normal and behavior is normal. Judgment and thought content normal.   Nursing note and vitals reviewed.      Assessment:     1. Constipation, unspecified constipation type      Plan:   DDx: Constipation, IBS, Viral gastroenteritis    Recommended increasing fiber in diet. Magnesium Citrate otc for constipation. Advised to f/u with PCP for further evaluation. ER precautions discussed. Patient verbalized understanding and agreed with tx plan.     Constipation, unspecified constipation type

## 2025-05-29 ENCOUNTER — OFFICE VISIT (OUTPATIENT)
Dept: OTOLARYNGOLOGY | Facility: CLINIC | Age: 35
End: 2025-05-29
Payer: COMMERCIAL

## 2025-05-29 VITALS — HEIGHT: 66 IN | WEIGHT: 200.38 LBS | BODY MASS INDEX: 32.2 KG/M2

## 2025-05-29 DIAGNOSIS — H69.91 EUSTACHIAN TUBE DYSFUNCTION, RIGHT: ICD-10-CM

## 2025-05-29 DIAGNOSIS — J01.90 ACUTE SINUSITIS, RECURRENCE NOT SPECIFIED, UNSPECIFIED LOCATION: Primary | ICD-10-CM

## 2025-05-29 DIAGNOSIS — J30.2 SEASONAL ALLERGIC RHINITIS, UNSPECIFIED TRIGGER: ICD-10-CM

## 2025-05-29 PROCEDURE — 99999 PR PBB SHADOW E&M-EST. PATIENT-LVL III: CPT | Mod: PBBFAC,,,

## 2025-05-29 RX ORDER — AMOXICILLIN AND CLAVULANATE POTASSIUM 875; 125 MG/1; MG/1
1 TABLET, FILM COATED ORAL 2 TIMES DAILY
Qty: 20 TABLET | Refills: 0 | Status: SHIPPED | OUTPATIENT
Start: 2025-05-29 | End: 2025-06-08

## 2025-05-29 RX ORDER — AZELASTINE 1 MG/ML
1 SPRAY, METERED NASAL 2 TIMES DAILY
Qty: 30 ML | Refills: 2 | Status: SHIPPED | OUTPATIENT
Start: 2025-05-29 | End: 2025-05-29

## 2025-05-29 RX ORDER — FLUTICASONE PROPIONATE 50 MCG
2 SPRAY, SUSPENSION (ML) NASAL DAILY
Qty: 16 G | Refills: 0 | Status: SHIPPED | OUTPATIENT
Start: 2025-05-29

## 2025-05-29 NOTE — PATIENT INSTRUCTIONS
"Eustachian Tube Dysfunction Protocol    Day 1-3 (Perform 2x per day)     Afrin (pump spray mist OTC) 2 sprays in each nostril   Fluticasone nasal spray 2 sprays in each nostril      Days 4 - follow up visit (perform 1x per day)    Fluticasone nasal spray 2 sprays in each nostril    *Buy the OTC Afrin pump spray mist - it should be in the allergy/cold/sinus section of the pharmacy  *Autoinsufflate (POP your ears gently!) at least 4 times per day until your follow up appointment    *TAKE ALL OTHER MEDICATIONS AS DIRECTED     *MAKE SURE YOU STOP USING AFRIN AFTER THE 3RD DAY AS THERE IS A RISK OF BECOMING DEPENDENT ON THAT MEDICATION       Dr. Monet's Sinus Protocol    Sinusitis is caused by inflammation creating blockage in the natural drainage pathways of the sinuses.  This "Sinus Protocol" is designed to open, flush out and decrease the inflammation within those pathways.    Day 1-3 (Perform 2x per day)     Afrin (pump spray mist OTC) 2 sprays in each nostril   Leo Med Sinus Wash - Make sure you use distilled water!  Fluticasone nasal spray 2 sprays in each nostril      Days 4 - follow up visit (perform 1x per day)    Leo Med Sinus wash  Fluticasone nasal spray 2 sprays in each nostril      *TAKE ALL OTHER MEDICATIONS AS DIRECTED BY DR MONET  *MAKE SURE YOU STOP USING AFRIN AFTER THE 3RD DAY AS THERE IS A RISK OF BECOMING DEPENDENT ON THAT MEDICATION   "

## 2025-05-29 NOTE — PROGRESS NOTES
Subjective:   Patient ID: Smith Love is a 34 y.o. female.    Chief Complaint: Sinus Problem and Ear Problem (Pt is coming in today for pressure in the face. Pt c/o sinus pressure, pressure under R eye, stuffy nose, sinus drip, headaches, spitting up green mucus, L ear pain, R ear ringing, and off balance.)    Patient is a pleasant 34 year old female presenting for facial pressure. Symptoms have been present for about 10 days. She c/o sinus drip, sneezing, upper tooth pain, head ache, thick nasal drainage, tension behind her eyes, & occasionally coughing up purulent drainage. Denies fever. Also admits to ear pressure, AD>AS with occasional tinnitus. She has seen allergy in the past with allergy testing that showed significant positives. She uses her albuterol inhaler, oral antihistamine, and Flonase inconsistently. Last antibiotics and steroids was about a year ago.  Denies smoking.     Sinus Problem  Associated symptoms include congestion, sinus pressure and sneezing. Pertinent negatives include no chills, coughing, ear pain, headaches, shortness of breath or sore throat.     Review of patient's allergies indicates:  No Known Allergies        Review of Systems   Constitutional:  Negative for chills, fatigue, fever and unexpected weight change.   HENT:  Positive for congestion, dental problem, postnasal drip, rhinorrhea, sinus pressure and sneezing. Negative for ear discharge, ear pain, facial swelling, hearing loss, nosebleeds, sore throat, tinnitus, trouble swallowing and voice change.    Eyes:  Negative for redness, itching and visual disturbance.   Respiratory:  Negative for cough, choking, shortness of breath and wheezing.    Cardiovascular:  Negative for chest pain and palpitations.   Gastrointestinal:  Negative for abdominal pain.        No reflux.   Musculoskeletal:  Negative for gait problem.   Skin:  Negative for rash.   Neurological:  Negative for dizziness, light-headedness and headaches.  "        Objective:   Ht 5' 6" (1.676 m)   Wt 90.9 kg (200 lb 6.4 oz)   LMP 04/30/2025 (Exact Date)   BMI 32.35 kg/m²     Physical Exam  Constitutional:       General: She is not in acute distress.     Appearance: Normal appearance. She is not ill-appearing.   HENT:      Head: Normocephalic and atraumatic.      Right Ear: Ear canal and external ear normal. No middle ear effusion. There is no impacted cerumen. Tympanic membrane is retracted.      Left Ear: Tympanic membrane, ear canal and external ear normal.  No middle ear effusion. There is no impacted cerumen.      Ears:      Comments: Tympanograms   AD- pk 0.3ml/-250daPa; ECV 1.2 ml   AS - pk 0.4ml/-27daPa; ECV 1.0ml     Nose: Nose normal. No congestion or rhinorrhea.      Mouth/Throat:      Mouth: Mucous membranes are moist.      Pharynx: Oropharynx is clear. Uvula midline. No oropharyngeal exudate or posterior oropharyngeal erythema.   Eyes:      Extraocular Movements: Extraocular movements intact.      Conjunctiva/sclera: Conjunctivae normal.      Pupils: Pupils are equal, round, and reactive to light.   Neck:      Thyroid: No thyroid mass.   Pulmonary:      Effort: Pulmonary effort is normal. No respiratory distress.   Musculoskeletal:         General: Normal range of motion.      Cervical back: Full passive range of motion without pain.   Lymphadenopathy:      Cervical: No cervical adenopathy.   Neurological:      General: No focal deficit present.      Mental Status: She is alert and oriented to person, place, and time.   Psychiatric:         Mood and Affect: Mood normal.         Behavior: Behavior normal. Behavior is cooperative.         Thought Content: Thought content normal.         Judgment: Judgment normal.              Assessment:     1. Acute sinusitis, recurrence not specified, unspecified location    2. Seasonal allergic rhinitis, unspecified trigger    3. Eustachian tube dysfunction, right        Plan:     Acute sinusitis, recurrence not " specified, unspecified location  -     amoxicillin-clavulanate 875-125mg (AUGMENTIN) 875-125 mg per tablet; Take 1 tablet by mouth 2 (two) times daily. for 10 days  Dispense: 20 tablet; Refill: 0    Seasonal allergic rhinitis, unspecified trigger  -     fluticasone propionate (FLONASE) 50 mcg/actuation nasal spray; 2 sprays (100 mcg total) by Each Nostril route once daily.  Dispense: 16 g; Refill: 0  -     Discontinue: azelastine (ASTELIN) 137 mcg (0.1 %) nasal spray; 1 spray (137 mcg total) by Nasal route 2 (two) times daily.  Dispense: 30 mL; Refill: 2    Eustachian tube dysfunction, right      We discussed her symptoms today in detail. I suspect that she has developed a sinus infection due to uncontrolled allergies. We discussed possible follow up with allergy after this acute infection resolves. We decided to treat with Augmentin BID x 10 days & restart her allergy medications (Flonase, oral antihistamine, Astelin). Discussed a short course of oral steroids but she would like to hold off for now because she is a diabetic. We discussed following ETD & sinus protocol as indicated below. I would recommend the patient begin to use a steroid nasal spray, and we discussed in detail the proper mechanism of use directing the spray away from the nasal septum.  In addition, we also discussed that it will take two to three weeks of daily use to achieve maximal effectiveness.  Leo med sinus bottle provided today. Recheck in 2-3 weeks or sooner if needed.     Eustachian Tube Dysfunction Protocol    Day 1-3 (Perform 2x per day)     Afrin (pump spray mist OTC) 2 sprays in each nostril   Fluticasone nasal spray 2 sprays in each nostril      Days 4 - follow up visit (perform 1x per day)    Fluticasone nasal spray 2 sprays in each nostril    *Buy the OTC Afrin pump spray mist - it should be in the allergy/cold/sinus section of the pharmacy  *Autoinsufflate (POP your ears gently!) at least 4 times per day until your follow up  "appointment    *TAKE ALL OTHER MEDICATIONS AS DIRECTED     *MAKE SURE YOU STOP USING AFRIN AFTER THE 3RD DAY AS THERE IS A RISK OF BECOMING DEPENDENT ON THAT MEDICATION         Sinus Protocol    Sinusitis is caused by inflammation creating blockage in the natural drainage pathways of the sinuses.  This "Sinus Protocol" is designed to open, flush out and decrease the inflammation within those pathways.      Day 1-3 (Perform 2x per day)     Afrin (pump spray mist OTC) 2 sprays in each nostril   Leo Med Sinus Wash - Make sure you use distilled water!  Fluticasone nasal spray 2 sprays in each nostril      Days 4 - follow up visit (perform 1x per day)    Leo Med Sinus wash  Fluticasone nasal spray 2 sprays in each nostril      *TAKE ALL OTHER MEDICATIONS AS DIRECTED BY DR VALDOVINOS  *MAKE SURE YOU STOP USING AFRIN AFTER THE 3RD DAY AS THERE IS A RISK OF BECOMING DEPENDENT ON THAT MEDICATION     "

## 2025-06-24 DIAGNOSIS — E11.9 TYPE 2 DIABETES MELLITUS WITHOUT COMPLICATION, WITHOUT LONG-TERM CURRENT USE OF INSULIN: ICD-10-CM

## 2025-06-24 NOTE — TELEPHONE ENCOUNTER
Care Due:                  Date            Visit Type   Department     Provider  --------------------------------------------------------------------------------                                MYCHART                              FOLLOWUP/OF  [facility]  Last Visit: 03-      [facility] VISIT   MEDICINE       Caro Hernandez                              EP -                              PRIMARY      [facility]  Next Visit: 09-      CARE ([facility])   MEDICINE       Caro Hernandez                                                            Last  Test          Frequency    Reason                     Performed    Due Date  --------------------------------------------------------------------------------    HBA1C.......  6 months...  metFORMIN................  03- 09-    [facility] Embedded Care Due Messages. Reference number: [id number].   6/24/2025 8:43:52 AM CDT

## 2025-06-25 RX ORDER — METFORMIN HYDROCHLORIDE 500 MG/1
500 TABLET ORAL 2 TIMES DAILY WITH MEALS
Qty: 60 TABLET | Refills: 5 | Status: SHIPPED | OUTPATIENT
Start: 2025-06-25

## 2025-08-15 ENCOUNTER — OFFICE VISIT (OUTPATIENT)
Dept: OPHTHALMOLOGY | Facility: CLINIC | Age: 35
End: 2025-08-15
Payer: COMMERCIAL

## 2025-08-15 DIAGNOSIS — H52.7 REFRACTIVE ERRORS: ICD-10-CM

## 2025-08-15 DIAGNOSIS — E11.9 DIABETES MELLITUS TYPE 2 WITHOUT RETINOPATHY: Primary | ICD-10-CM

## 2025-08-15 PROCEDURE — 99999 PR PBB SHADOW E&M-EST. PATIENT-LVL III: CPT | Mod: PBBFAC,,, | Performed by: OPTOMETRIST

## 2025-08-29 ENCOUNTER — OFFICE VISIT (OUTPATIENT)
Dept: URGENT CARE | Facility: CLINIC | Age: 35
End: 2025-08-29
Payer: COMMERCIAL

## 2025-08-29 VITALS
BODY MASS INDEX: 32.08 KG/M2 | DIASTOLIC BLOOD PRESSURE: 95 MMHG | SYSTOLIC BLOOD PRESSURE: 132 MMHG | WEIGHT: 199.63 LBS | HEIGHT: 66 IN | HEART RATE: 90 BPM | OXYGEN SATURATION: 96 % | RESPIRATION RATE: 18 BRPM

## 2025-08-29 DIAGNOSIS — Z86.39 HX OF DIABETES MELLITUS: ICD-10-CM

## 2025-08-29 DIAGNOSIS — R53.83 FATIGUE, UNSPECIFIED TYPE: ICD-10-CM

## 2025-08-29 DIAGNOSIS — J01.40 ACUTE NON-RECURRENT PANSINUSITIS: Primary | ICD-10-CM

## 2025-08-29 DIAGNOSIS — J30.1 SEASONAL ALLERGIC RHINITIS DUE TO POLLEN: ICD-10-CM

## 2025-08-29 DIAGNOSIS — J34.89 SINUS PRESSURE: ICD-10-CM

## 2025-08-29 LAB
CTP QC/QA: YES
GLUCOSE SERPL-MCNC: 225 MG/DL (ref 70–110)
SARS-COV+SARS-COV-2 AG RESP QL IA.RAPID: NEGATIVE

## 2025-08-29 PROCEDURE — 87811 SARS-COV-2 COVID19 W/OPTIC: CPT | Mod: QW,S$GLB,, | Performed by: NURSE PRACTITIONER

## 2025-08-29 PROCEDURE — 99214 OFFICE O/P EST MOD 30 MIN: CPT | Mod: S$GLB,,, | Performed by: NURSE PRACTITIONER

## 2025-08-29 PROCEDURE — 82962 GLUCOSE BLOOD TEST: CPT | Mod: S$GLB,,, | Performed by: NURSE PRACTITIONER

## 2025-08-29 RX ORDER — IPRATROPIUM BROMIDE 21 UG/1
2 SPRAY, METERED NASAL 3 TIMES DAILY PRN
Qty: 30 ML | Refills: 1 | Status: SHIPPED | OUTPATIENT
Start: 2025-08-29

## 2025-08-29 RX ORDER — LEVOCETIRIZINE DIHYDROCHLORIDE 5 MG/1
5 TABLET, FILM COATED ORAL NIGHTLY
Qty: 30 TABLET | Refills: 11 | Status: SHIPPED | OUTPATIENT
Start: 2025-08-29 | End: 2026-08-29

## 2025-08-29 RX ORDER — AZELASTINE 1 MG/ML
2 SPRAY, METERED NASAL 2 TIMES DAILY
Qty: 30 ML | Refills: 6 | Status: SHIPPED | OUTPATIENT
Start: 2025-08-29

## 2025-08-29 RX ORDER — CEFDINIR 300 MG/1
300 CAPSULE ORAL 2 TIMES DAILY
Qty: 14 CAPSULE | Refills: 0 | Status: SHIPPED | OUTPATIENT
Start: 2025-08-29 | End: 2025-09-05